# Patient Record
Sex: FEMALE | Race: WHITE | Employment: OTHER | URBAN - METROPOLITAN AREA
[De-identification: names, ages, dates, MRNs, and addresses within clinical notes are randomized per-mention and may not be internally consistent; named-entity substitution may affect disease eponyms.]

---

## 2017-01-09 ENCOUNTER — HOSPITAL ENCOUNTER (OUTPATIENT)
Dept: ULTRASOUND IMAGING | Age: 57
Discharge: HOME | End: 2017-01-09
Attending: INTERNAL MEDICINE
Payer: COMMERCIAL

## 2017-01-09 ENCOUNTER — TELEPHONE (OUTPATIENT)
Dept: FAMILY MEDICINE CLINIC | Facility: CLINIC | Age: 57
End: 2017-01-09

## 2017-01-09 DIAGNOSIS — R10.11 RIGHT UPPER QUADRANT PAIN: ICD-10-CM

## 2017-01-09 DIAGNOSIS — R10.11 RUQ ABDOMINAL PAIN: Primary | ICD-10-CM

## 2017-01-09 PROCEDURE — 76700 US EXAM ABDOM COMPLETE: CPT

## 2017-01-11 ENCOUNTER — TELEPHONE (OUTPATIENT)
Dept: FAMILY MEDICINE CLINIC | Facility: CLINIC | Age: 57
End: 2017-01-11

## 2017-01-12 ENCOUNTER — TELEPHONE (OUTPATIENT)
Dept: FAMILY MEDICINE CLINIC | Facility: CLINIC | Age: 57
End: 2017-01-12

## 2017-01-12 NOTE — TELEPHONE ENCOUNTER
Per Gino Herrera at [facility] [phone] no prior authorization is required reference # [id number]. Marjorie Bullock at [facility] advised.

## 2017-01-16 ENCOUNTER — TELEPHONE (OUTPATIENT)
Dept: FAMILY MEDICINE CLINIC | Facility: CLINIC | Age: 57
End: 2017-01-16

## 2017-01-16 ENCOUNTER — HOSPITAL ENCOUNTER (OUTPATIENT)
Dept: NUCLEAR MEDICINE | Facility: HOSPITAL | Age: 57
Discharge: HOME | End: 2017-01-16
Attending: INTERNAL MEDICINE
Payer: COMMERCIAL

## 2017-01-16 ENCOUNTER — OFFICE VISIT (OUTPATIENT)
Dept: FAMILY MEDICINE CLINIC | Facility: CLINIC | Age: 57
End: 2017-01-16

## 2017-01-16 VITALS
DIASTOLIC BLOOD PRESSURE: 80 MMHG | BODY MASS INDEX: 34 KG/M2 | SYSTOLIC BLOOD PRESSURE: 118 MMHG | TEMPERATURE: 98 F | RESPIRATION RATE: 16 BRPM | WEIGHT: 200 LBS

## 2017-01-16 DIAGNOSIS — R10.11 RUQ ABDOMINAL PAIN: ICD-10-CM

## 2017-01-16 DIAGNOSIS — J01.00 SUBACUTE MAXILLARY SINUSITIS: Primary | ICD-10-CM

## 2017-01-16 PROCEDURE — 78227 HEPATOBIL SYST IMAGE W/DRUG: CPT

## 2017-01-16 PROCEDURE — 99214 OFFICE O/P EST MOD 30 MIN: CPT | Performed by: INTERNAL MEDICINE

## 2017-01-16 RX ORDER — AZITHROMYCIN 250 MG/1
TABLET, FILM COATED ORAL
Qty: 6 TABLET | Refills: 0 | Status: SHIPPED | OUTPATIENT
Start: 2017-01-16 | End: 2017-04-17 | Stop reason: ALTCHOICE

## 2017-01-16 NOTE — PROGRESS NOTES
Ulisses  is a 61year old female. HPI:   Pt has been having more sinus pressure and PND. Very prone to infections. Current Outpatient Prescriptions:  Omeprazole 40 MG Oral Capsule Delayed Release Take 1 capsule (40 mg total) by mouth daily.  D cyanosis, clubbing or edema    ASSESSMENT AND PLAN:   Subacute maxillary sinusitis  (primary encounter diagnosis)  Empiric antibiotics. May use OTC medications for cough, low grade fever, or congestion.   Return if symptoms do not resolve or improve in the

## 2017-01-17 ENCOUNTER — TELEPHONE (OUTPATIENT)
Dept: FAMILY MEDICINE CLINIC | Facility: CLINIC | Age: 57
End: 2017-01-17

## 2017-01-29 ENCOUNTER — HOSPITAL ENCOUNTER (OUTPATIENT)
Age: 57
Discharge: OTHER INSTITUTION | End: 2017-01-29
Attending: EMERGENCY MEDICINE
Payer: COMMERCIAL

## 2017-01-29 VITALS
RESPIRATION RATE: 20 BRPM | OXYGEN SATURATION: 96 % | HEART RATE: 140 BPM | WEIGHT: 200 LBS | SYSTOLIC BLOOD PRESSURE: 141 MMHG | BODY MASS INDEX: 34.15 KG/M2 | TEMPERATURE: 100 F | DIASTOLIC BLOOD PRESSURE: 91 MMHG | HEIGHT: 64 IN

## 2017-01-29 DIAGNOSIS — R11.2 NAUSEA VOMITING AND DIARRHEA: ICD-10-CM

## 2017-01-29 DIAGNOSIS — R73.9 HYPERGLYCEMIA: ICD-10-CM

## 2017-01-29 DIAGNOSIS — E87.5 HYPERKALEMIA: Primary | ICD-10-CM

## 2017-01-29 DIAGNOSIS — R19.7 NAUSEA VOMITING AND DIARRHEA: ICD-10-CM

## 2017-01-29 LAB
#LYMPHOCYTE IC: 0.2 X10ˆ3/UL (ref 0.9–3.2)
#MXD IC: 0.3 X10ˆ3/UL (ref 0.1–1)
#NEUTROPHIL IC: 9.3 X10ˆ3/UL (ref 1.3–6.7)
ATRIAL RATE: 119 BPM
CREAT SERPL-MCNC: 0.6 MG/DL (ref 0.4–1)
CREAT SERPL-MCNC: 0.6 MG/DL (ref 0.4–1)
GLUCOSE BLD-MCNC: 343 MG/DL (ref 65–99)
GLUCOSE BLD-MCNC: 347 MG/DL (ref 65–99)
HCT IC: 49.7 % (ref 37–54)
HGB IC: 16.9 G/DL (ref 11.7–16)
ISTAT BLOOD GAS TCO2: 19 MMOL/L (ref 22–32)
ISTAT BLOOD GAS TCO2: 21 MMOL/L (ref 22–32)
ISTAT BUN: 26 MG/DL (ref 8–20)
ISTAT BUN: 28 MG/DL (ref 8–20)
ISTAT CHLORIDE: 104 MMOL/L (ref 101–111)
ISTAT CHLORIDE: 105 MMOL/L (ref 101–111)
ISTAT HEMATOCRIT: 52 % (ref 37–54)
ISTAT HEMATOCRIT: 53 % (ref 37–54)
ISTAT IONIZED CALCIUM: 1 MMOL/L (ref 1.12–1.32)
ISTAT IONIZED CALCIUM: 1.02 MMOL/L (ref 1.12–1.32)
ISTAT POTASSIUM: 6.3 MMOL/L (ref 3.6–5.1)
ISTAT POTASSIUM: 6.4 MMOL/L (ref 3.6–5.1)
ISTAT SODIUM: 137 MMOL/L (ref 136–144)
ISTAT SODIUM: 137 MMOL/L (ref 136–144)
MCH IC: 28.3 PG (ref 27–33.2)
MCHC IC: 34 G/DL (ref 31–37)
MCV IC: 83.1 FL (ref 81–100)
P AXIS: 26 DEGREES
P-R INTERVAL: 138 MS
PLT IC: 253 X10ˆ3/UL (ref 150–450)
Q-T INTERVAL: 332 MS
QRS DURATION: 86 MS
QTC CALCULATION (BEZET): 467 MS
R AXIS: -2 DEGREES
RBC IC: 5.98 X10ˆ6/UL (ref 3.8–5.1)
T AXIS: 49 DEGREES
VENTRICULAR RATE: 119 BPM
WBC IC: 9.8 X10ˆ3/UL (ref 4–13)

## 2017-01-29 PROCEDURE — 80047 BASIC METABLC PNL IONIZED CA: CPT

## 2017-01-29 PROCEDURE — 81002 URINALYSIS NONAUTO W/O SCOPE: CPT | Performed by: EMERGENCY MEDICINE

## 2017-01-29 PROCEDURE — 93005 ELECTROCARDIOGRAM TRACING: CPT

## 2017-01-29 PROCEDURE — 99205 OFFICE O/P NEW HI 60 MIN: CPT

## 2017-01-29 PROCEDURE — 96374 THER/PROPH/DIAG INJ IV PUSH: CPT

## 2017-01-29 PROCEDURE — 96375 TX/PRO/DX INJ NEW DRUG ADDON: CPT

## 2017-01-29 PROCEDURE — 93010 ELECTROCARDIOGRAM REPORT: CPT

## 2017-01-29 PROCEDURE — 85025 COMPLETE CBC W/AUTO DIFF WBC: CPT | Performed by: EMERGENCY MEDICINE

## 2017-01-29 PROCEDURE — 99215 OFFICE O/P EST HI 40 MIN: CPT

## 2017-01-29 PROCEDURE — 82962 GLUCOSE BLOOD TEST: CPT

## 2017-01-29 RX ORDER — KETOROLAC TROMETHAMINE 30 MG/ML
30 INJECTION, SOLUTION INTRAMUSCULAR; INTRAVENOUS ONCE
Status: COMPLETED | OUTPATIENT
Start: 2017-01-29 | End: 2017-01-29

## 2017-01-29 RX ORDER — ONDANSETRON 2 MG/ML
8 INJECTION INTRAMUSCULAR; INTRAVENOUS ONCE
Status: COMPLETED | OUTPATIENT
Start: 2017-01-29 | End: 2017-01-29

## 2017-01-29 RX ORDER — SODIUM CHLORIDE 9 MG/ML
1000 INJECTION, SOLUTION INTRAVENOUS ONCE
Status: COMPLETED | OUTPATIENT
Start: 2017-01-29 | End: 2017-01-29

## 2017-01-29 NOTE — ED INITIAL ASSESSMENT (HPI)
Vomiting started in middle of night around 2 am, and now some diarrhea. Nausea now. Just completed zpak earlier this week for sinus infection.

## 2017-01-29 NOTE — ED PROVIDER NOTES
Patient presents with:  Nausea/Vomiting/Diarrhea (gastrointestinal)    HPI:     Molly Méndez is a 61year old female with hx of DM, HTN, diverticulosis who presents with chief complaint of n/v/d. States she woke up this am at 2 and began vomiting.    Sta [address]  Rhythm: Sinus tachycardia  No acute changes noted. MDM:     N/V/D with benign abdomen. Accucheck obtained 294. IV placed with toradol and zofran given along with IVF (CMP in Dec 2016 with normal Cr). ISTAT with K 6.4.   Had RN repeat while EKG

## 2017-01-30 ENCOUNTER — TELEPHONE (OUTPATIENT)
Dept: FAMILY MEDICINE CLINIC | Facility: CLINIC | Age: 57
End: 2017-01-30

## 2017-01-30 LAB — GLUCOSE BLD-MCNC: 294 MG/DL (ref 65–99)

## 2017-01-30 NOTE — TELEPHONE ENCOUNTER
Patient said she was in the ER yesterday for dehydration due to vomiting and diarrhea and they gave her Zofran. She said they took her by ambulance from [facility] urgent care to [facility] because her potassium was high.  she said it was normal at [place] [address] so t

## 2017-02-02 ENCOUNTER — OFFICE VISIT (OUTPATIENT)
Dept: FAMILY MEDICINE CLINIC | Facility: CLINIC | Age: 57
End: 2017-02-02

## 2017-02-02 VITALS — TEMPERATURE: 98 F | BODY MASS INDEX: 35 KG/M2 | HEIGHT: 64 IN | WEIGHT: 205 LBS

## 2017-02-02 DIAGNOSIS — A08.4 VIRAL GASTROENTERITIS: Primary | ICD-10-CM

## 2017-02-02 PROCEDURE — 99213 OFFICE O/P EST LOW 20 MIN: CPT | Performed by: INTERNAL MEDICINE

## 2017-02-02 RX ORDER — ONDANSETRON 4 MG/1
TABLET, ORALLY DISINTEGRATING ORAL
COMMUNITY
Start: 2017-01-29 | End: 2017-04-25 | Stop reason: ALTCHOICE

## 2017-02-02 NOTE — PROGRESS NOTES
Aurelia Brambila is a 61year old female. HPI:   Pt has had the stomach flu; she was seen in UC and given fluids. K was drawn and >6 so she was sent to [facility] ER, where is was rechecked and 5.4. They think it was a lab error.   She had several EKG's and no Temp(Src) 97.8 °F (36.6 °C) (Temporal)  Ht 64\"  Wt 205 lb  BMI 35.17 kg/m2  GENERAL: well developed, well nourished,in no apparent distress  SKIN: no rashes,no suspicious lesions  HEENT: atraumatic, normocephalic,ears and throat are clear  NECK: supple,no

## 2017-02-07 ENCOUNTER — TELEPHONE (OUTPATIENT)
Dept: FAMILY MEDICINE CLINIC | Facility: CLINIC | Age: 57
End: 2017-02-07

## 2017-02-07 NOTE — TELEPHONE ENCOUNTER
Return to work note faxed to office of the  [phone], it is not going through. LM for patient to Cb.  Note faxed to patient at [phone]

## 2017-02-08 ENCOUNTER — MED REC SCAN ONLY (OUTPATIENT)
Dept: FAMILY MEDICINE CLINIC | Facility: CLINIC | Age: 57
End: 2017-02-08

## 2017-02-16 ENCOUNTER — TELEPHONE (OUTPATIENT)
Dept: FAMILY MEDICINE CLINIC | Facility: CLINIC | Age: 57
End: 2017-02-16

## 2017-02-16 RX ORDER — FLUCONAZOLE 150 MG/1
150 TABLET ORAL ONCE
Qty: 1 TABLET | Refills: 0 | Status: SHIPPED | OUTPATIENT
Start: 2017-02-16 | End: 2017-02-16

## 2017-02-16 NOTE — TELEPHONE ENCOUNTER
Patient said she has been having some vaginal itching and burning but no burning with urination. She has been taking the Florastor since having the stomach flu and didn't know if she should stop it. Will dr Zenon Spatz also prescribe some Diflucan?

## 2017-02-20 ENCOUNTER — TELEPHONE (OUTPATIENT)
Dept: FAMILY MEDICINE CLINIC | Facility: CLINIC | Age: 57
End: 2017-02-20

## 2017-02-20 NOTE — TELEPHONE ENCOUNTER
Patient advised that Dr Isabella Garcia filled out the paper work. She said that the FMLA can only go back one month. She will call her  to confirm and let us know where she wants it sent.

## 2017-02-20 NOTE — TELEPHONE ENCOUNTER
Patient said she was put on disciplinary action for having mulitiple episodes of being sick for the last year from 1/15/16. She said she never turned in her type 2 FMLA form last year paperwork was filled out 1/15/16.  She said that she only had three dates

## 2017-02-21 NOTE — TELEPHONE ENCOUNTER
FMLA paperwork faxed to Bethany Bahena in Personnel [phone], patient is also requesting a doctors note for 1/10/17 stating she had discomfort from the ultrasound of her gallbladder she had on 1/9/17.  Note faxed also copy of paperwork up front for patient to

## 2017-02-27 ENCOUNTER — TELEPHONE (OUTPATIENT)
Dept: FAMILY MEDICINE CLINIC | Facility: CLINIC | Age: 57
End: 2017-02-27

## 2017-03-24 ENCOUNTER — TELEPHONE (OUTPATIENT)
Dept: FAMILY MEDICINE CLINIC | Facility: CLINIC | Age: 57
End: 2017-03-24

## 2017-03-24 DIAGNOSIS — R10.11 RIGHT UPPER QUADRANT ABDOMINAL PAIN: Primary | ICD-10-CM

## 2017-03-24 RX ORDER — OMEPRAZOLE 40 MG/1
40 CAPSULE, DELAYED RELEASE ORAL DAILY
Qty: 30 CAPSULE | Refills: 0 | Status: SHIPPED | OUTPATIENT
Start: 2017-03-24 | End: 2017-04-25

## 2017-03-24 NOTE — TELEPHONE ENCOUNTER
Received fax stating that plan does not cover Omeprazole 40 mg tabs. I called plan at [phone]. I spoke with Noa Velasquez, clinical information was provided and medication was approved for 36 months.  [facility] #[id number]  Opal Elizalde, 03/24/2017, 1

## 2017-04-17 ENCOUNTER — HOSPITAL (OUTPATIENT)
Dept: OTHER | Age: 57
End: 2017-04-17
Attending: OBSTETRICS & GYNECOLOGY

## 2017-04-25 ENCOUNTER — TELEPHONE (OUTPATIENT)
Dept: FAMILY MEDICINE CLINIC | Facility: CLINIC | Age: 57
End: 2017-04-25

## 2017-04-25 ENCOUNTER — OFFICE VISIT (OUTPATIENT)
Dept: FAMILY MEDICINE CLINIC | Facility: CLINIC | Age: 57
End: 2017-04-25

## 2017-04-25 VITALS — SYSTOLIC BLOOD PRESSURE: 128 MMHG | DIASTOLIC BLOOD PRESSURE: 60 MMHG | TEMPERATURE: 98 F

## 2017-04-25 DIAGNOSIS — E78.5 HYPERLIPIDEMIA, UNSPECIFIED HYPERLIPIDEMIA TYPE: ICD-10-CM

## 2017-04-25 DIAGNOSIS — J32.9 SINUSITIS, UNSPECIFIED CHRONICITY, UNSPECIFIED LOCATION: Primary | ICD-10-CM

## 2017-04-25 DIAGNOSIS — I10 ESSENTIAL HYPERTENSION, BENIGN: ICD-10-CM

## 2017-04-25 PROCEDURE — 99214 OFFICE O/P EST MOD 30 MIN: CPT | Performed by: FAMILY MEDICINE

## 2017-04-25 RX ORDER — AZITHROMYCIN 250 MG/1
TABLET, FILM COATED ORAL
Qty: 6 TABLET | Refills: 0 | Status: SHIPPED | OUTPATIENT
Start: 2017-04-25 | End: 2017-06-12 | Stop reason: ALTCHOICE

## 2017-04-25 RX ORDER — PREDNISONE 20 MG/1
20 TABLET ORAL 2 TIMES DAILY
Qty: 10 TABLET | Refills: 0 | Status: SHIPPED | OUTPATIENT
Start: 2017-04-25 | End: 2017-05-02

## 2017-04-25 RX ORDER — OMEPRAZOLE 40 MG/1
40 CAPSULE, DELAYED RELEASE ORAL DAILY
Qty: 90 CAPSULE | Refills: 0 | Status: SHIPPED | OUTPATIENT
Start: 2017-04-25 | End: 2017-07-24

## 2017-04-25 NOTE — PATIENT INSTRUCTIONS
Meds as ordered. Recommend short course of decongestant ×3-5 days. Recommend aspirin daily. Discussed factor V Leiden deficiency that mother has. Follow-up with Dr. Arthur Manuel as directed.

## 2017-04-25 NOTE — PROGRESS NOTES
HPI:    Patient ID: Alexandra Friedman is a 61year old female. X 10 days + head janessa  Rare cough  + sinus pressure /   HPI    Review of Systems   Constitutional: Positive for fatigue. Negative for fever and chills.    HENT: Positive for congestion, postnasal warm and dry. Vitals reviewed.       /60 mmHg  Temp(Src) 97.6 °F (36.4 °C) (Temporal)  Ht   Wt          ASSESSMENT/PLAN:   Sinusitis, unspecified chronicity, unspecified location  (primary encounter diagnosis)    No orders of the defined types were

## 2017-05-17 ENCOUNTER — TELEPHONE (OUTPATIENT)
Dept: FAMILY MEDICINE CLINIC | Facility: CLINIC | Age: 57
End: 2017-05-17

## 2017-05-17 NOTE — TELEPHONE ENCOUNTER
Stremor message sent to patient advising her she is due for f/u with Dr. Ligia Alicia and labs.    Linda Armas, 05/17/2017, 3:53 PM

## 2017-05-22 ENCOUNTER — APPOINTMENT (OUTPATIENT)
Dept: LAB | Age: 57
End: 2017-05-22
Attending: INTERNAL MEDICINE
Payer: COMMERCIAL

## 2017-05-22 ENCOUNTER — OFFICE VISIT (OUTPATIENT)
Dept: FAMILY MEDICINE CLINIC | Facility: CLINIC | Age: 57
End: 2017-05-22

## 2017-05-22 VITALS
HEART RATE: 82 BPM | RESPIRATION RATE: 18 BRPM | TEMPERATURE: 98 F | HEIGHT: 65 IN | SYSTOLIC BLOOD PRESSURE: 124 MMHG | DIASTOLIC BLOOD PRESSURE: 80 MMHG

## 2017-05-22 DIAGNOSIS — E11.65 UNCONTROLLED TYPE 2 DIABETES MELLITUS WITH DIABETIC NEPHROPATHY, UNSPECIFIED LONG TERM INSULIN USE STATUS: ICD-10-CM

## 2017-05-22 DIAGNOSIS — E11.21 UNCONTROLLED TYPE 2 DIABETES MELLITUS WITH DIABETIC NEPHROPATHY, UNSPECIFIED LONG TERM INSULIN USE STATUS: ICD-10-CM

## 2017-05-22 DIAGNOSIS — E11.65 UNCONTROLLED TYPE 2 DIABETES MELLITUS WITH DIABETIC NEPHROPATHY, UNSPECIFIED LONG TERM INSULIN USE STATUS: Primary | ICD-10-CM

## 2017-05-22 DIAGNOSIS — H65.91 OME (OTITIS MEDIA WITH EFFUSION), RIGHT: ICD-10-CM

## 2017-05-22 DIAGNOSIS — E11.21 UNCONTROLLED TYPE 2 DIABETES MELLITUS WITH DIABETIC NEPHROPATHY, UNSPECIFIED LONG TERM INSULIN USE STATUS: Primary | ICD-10-CM

## 2017-05-22 PROCEDURE — 83036 HEMOGLOBIN GLYCOSYLATED A1C: CPT | Performed by: INTERNAL MEDICINE

## 2017-05-22 PROCEDURE — 99214 OFFICE O/P EST MOD 30 MIN: CPT | Performed by: INTERNAL MEDICINE

## 2017-05-22 PROCEDURE — 36415 COLL VENOUS BLD VENIPUNCTURE: CPT | Performed by: INTERNAL MEDICINE

## 2017-05-22 RX ORDER — CIPROFLOXACIN 500 MG/1
500 TABLET, FILM COATED ORAL 2 TIMES DAILY
Qty: 20 TABLET | Refills: 0 | Status: SHIPPED | OUTPATIENT
Start: 2017-05-22 | End: 2017-06-01

## 2017-05-22 NOTE — PROGRESS NOTES
HPI:   Kerline Alba is a 61year old female who presents for recheck of her diabetes. Pt has been checking her feet on a regular basis. Pt denies any tingling of the feet. Pt denies any issues with depression. Pt complains of ear pain in the right ear. azithromycin (ZITHROMAX Z-TONNY) 250 MG Oral Tab Take two tablets by mouth today, then one tablet daily.  Disp: 6 tablet Rfl: 0      Past Medical History   Diagnosis Date   • Essential hypertension, benign 11/5/2007   • Obesity, unspecified 11/5/2007   • Al clubbing or edema  NEURO: sensation is intact to monofilament     ASSESSMENT AND PLAN:   Taco Grande is a 61year old female who presents for a recheck of her diabetes. Diabetic control is unknown. She has a right otitis as well.   Recommendations are:

## 2017-05-26 ENCOUNTER — TELEPHONE (OUTPATIENT)
Dept: FAMILY MEDICINE CLINIC | Facility: CLINIC | Age: 57
End: 2017-05-26

## 2017-06-12 ENCOUNTER — OFFICE VISIT (OUTPATIENT)
Dept: FAMILY MEDICINE CLINIC | Facility: CLINIC | Age: 57
End: 2017-06-12

## 2017-06-12 VITALS
DIASTOLIC BLOOD PRESSURE: 80 MMHG | SYSTOLIC BLOOD PRESSURE: 120 MMHG | BODY MASS INDEX: 33 KG/M2 | TEMPERATURE: 98 F | WEIGHT: 197.13 LBS

## 2017-06-12 DIAGNOSIS — E11.21 UNCONTROLLED TYPE 2 DIABETES MELLITUS WITH DIABETIC NEPHROPATHY, UNSPECIFIED LONG TERM INSULIN USE STATUS: Primary | ICD-10-CM

## 2017-06-12 DIAGNOSIS — E11.65 UNCONTROLLED TYPE 2 DIABETES MELLITUS WITH DIABETIC NEPHROPATHY, UNSPECIFIED LONG TERM INSULIN USE STATUS: Primary | ICD-10-CM

## 2017-06-12 PROCEDURE — 99214 OFFICE O/P EST MOD 30 MIN: CPT | Performed by: INTERNAL MEDICINE

## 2017-06-12 RX ORDER — BLOOD-GLUCOSE METER
1 KIT MISCELLANEOUS AS NEEDED
Qty: 1 KIT | Refills: 0 | Status: SHIPPED | OUTPATIENT
Start: 2017-06-12 | End: 2017-06-13

## 2017-06-12 RX ORDER — METHYLPREDNISOLONE 4 MG/1
TABLET ORAL
Refills: 0 | COMMUNITY
Start: 2017-06-05 | End: 2017-09-19 | Stop reason: ALTCHOICE

## 2017-06-12 RX ORDER — CLARITHROMYCIN 500 MG/1
TABLET, COATED ORAL
Refills: 0 | COMMUNITY
Start: 2017-06-05 | End: 2017-08-07 | Stop reason: ALTCHOICE

## 2017-06-12 NOTE — PROGRESS NOTES
HPI:   Sadie Rae is a 61year old female who presents for recheck of her diabetes. Patient’s FBS have been high, but she rarely checks them. Pt has been checking her feet on a regular basis. Pt denies any tingling of the feet.  Pt denies any issues wi 1 capsule (40 mg total) by mouth daily. Disp: 90 capsule Rfl: 0   aspirin 81 MG Oral Tab Take 1 tablet (81 mg total) by mouth daily. Disp:  Rfl: 0   Atorvastatin Calcium (LIPITOR) 40 MG Oral Tab Take 1 tablet (40 mg total) by mouth nightly.  Disp: 90 tablet heartburn  NEURO: denies headaches    EXAM:   /80 mmHg  Temp(Src) 98 °F (36.7 °C) (Temporal)  Wt 197 lb 2 oz  GENERAL: well developed, well nourished,in no apparent distress  SKIN: no rashes,no suspicious lesions  NECK: supple,no adenopathy,no bruits

## 2017-06-13 ENCOUNTER — TELEPHONE (OUTPATIENT)
Dept: FAMILY MEDICINE CLINIC | Facility: CLINIC | Age: 57
End: 2017-06-13

## 2017-06-13 RX ORDER — BLOOD-GLUCOSE METER
1 EACH MISCELLANEOUS 2 TIMES DAILY
Qty: 1 KIT | Refills: 0 | Status: SHIPPED | OUTPATIENT
Start: 2017-06-13 | End: 2018-06-13

## 2017-06-13 RX ORDER — LANCETS 33 GAUGE
1 EACH MISCELLANEOUS 2 TIMES DAILY
Qty: 1 BOX | Refills: 0 | Status: SHIPPED | OUTPATIENT
Start: 2017-06-13 | End: 2018-06-13

## 2017-06-13 NOTE — TELEPHONE ENCOUNTER
Sonam states that patient's insurance will not pay for the Freestyle, they will pay for One Touch. New script faxed.

## 2017-07-06 ENCOUNTER — TELEPHONE (OUTPATIENT)
Dept: FAMILY MEDICINE CLINIC | Facility: CLINIC | Age: 57
End: 2017-07-06

## 2017-07-06 NOTE — TELEPHONE ENCOUNTER
EXPOSURE TO MENENGITIS BY A COWORKER IN PASSING?  SHE IS NOT HAVING SX.  PLEASE ADVISE ON WHAT TO WATCH FOR OR IF SHE HAS BEEN VACCINATED  PLEASE ADVISE

## 2017-07-06 NOTE — TELEPHONE ENCOUNTER
Patient said she dose not know if she has bacterial or viral meningitis. She said that her coworker was gone for 3 weeks and she had minimal contact. Is there anything she should do?

## 2017-07-25 RX ORDER — OMEPRAZOLE 40 MG/1
CAPSULE, DELAYED RELEASE ORAL
Qty: 90 CAPSULE | Refills: 0 | Status: SHIPPED | OUTPATIENT
Start: 2017-07-25 | End: 2017-10-17

## 2017-08-07 ENCOUNTER — OFFICE VISIT (OUTPATIENT)
Dept: FAMILY MEDICINE CLINIC | Facility: CLINIC | Age: 57
End: 2017-08-07

## 2017-08-07 VITALS
SYSTOLIC BLOOD PRESSURE: 120 MMHG | HEART RATE: 75 BPM | TEMPERATURE: 98 F | DIASTOLIC BLOOD PRESSURE: 84 MMHG | OXYGEN SATURATION: 99 %

## 2017-08-07 DIAGNOSIS — J01.00 ACUTE NON-RECURRENT MAXILLARY SINUSITIS: Primary | ICD-10-CM

## 2017-08-07 PROCEDURE — 99213 OFFICE O/P EST LOW 20 MIN: CPT | Performed by: FAMILY MEDICINE

## 2017-08-07 RX ORDER — CIPROFLOXACIN 500 MG/1
500 TABLET, FILM COATED ORAL 2 TIMES DAILY
Qty: 20 TABLET | Refills: 0 | Status: SHIPPED | OUTPATIENT
Start: 2017-08-07 | End: 2017-09-19 | Stop reason: ALTCHOICE

## 2017-08-07 NOTE — PROGRESS NOTES
Giacomo Bonds is a 61year old female. Patient presents with: Other: sore throat started on 8/4, headache, achy, congestion--taking mucinex sinus max. ... [facility] room 2      HPI:   Patient complains of sinus congestion, headache, postnasal drip, sore throat.   Gokul Anastacio APPENDECTOMY  1977: BREAST SURGERY  No date: CHOLECYSTECTOMY  12/14: COLON SURGERY      Comment: removed 10 inches of colon for diverticulitis  11/14: COLONOSCOPY      Comment: diverticulitis  1994: HYSTERECTOMY      Comment: LAV for endometriosis (Ovarie antibiotics. The only definitive means to diagnose a bacterial sinus infection is to aspirate fluid from the sinus cavity and then culture that fluid. That is a painful and impractical procedure in an office setting.  The choice to try an antibiotic is based

## 2017-08-08 ENCOUNTER — TELEPHONE (OUTPATIENT)
Dept: FAMILY MEDICINE CLINIC | Facility: CLINIC | Age: 57
End: 2017-08-08

## 2017-08-08 NOTE — TELEPHONE ENCOUNTER
Calling the patient to see if she just needs a note for today.    Left message   Placed the note at the

## 2017-08-08 NOTE — TELEPHONE ENCOUNTER
SHE STILL DOES NOT FEEL WELL AND IS LEAVING WORK AND WANTS TO KNOW IF SHE CAN STOP AND GET A NOTE FOR WORK FOR TODAY.     LEAVE MESSAGE SHE SAID

## 2017-08-09 ENCOUNTER — TELEPHONE (OUTPATIENT)
Dept: FAMILY MEDICINE CLINIC | Facility: CLINIC | Age: 57
End: 2017-08-09

## 2017-08-09 NOTE — TELEPHONE ENCOUNTER
Pt called again, sd she can see the letter in my chart, but she cant print it off,  Can it be faxed to [employer].  Of [employer] [phone]

## 2017-08-09 NOTE — TELEPHONE ENCOUNTER
She needs a note faxed to work ASAP stating: please excuse Lela Mares from work on 8-8-17 from 3:15 to 5:30pm.  Fax [phone]. She said her employer is giving her a hard time and she needs the note ASAP.

## 2017-08-26 ENCOUNTER — TELEPHONE (OUTPATIENT)
Dept: FAMILY MEDICINE CLINIC | Facility: CLINIC | Age: 57
End: 2017-08-26

## 2017-08-26 NOTE — TELEPHONE ENCOUNTER
Requesting a work excuse for 8/25/2017.   Patient was having a gallbladder attack yesterday,   Was told it would be addressed on Monday

## 2017-08-28 NOTE — TELEPHONE ENCOUNTER
Patient said that she has FMLA for her gallbladder, she had pizza Thursday and Friday she had abdominal pain and vomiting. Patient advised note is up front for her to .

## 2017-09-19 ENCOUNTER — OFFICE VISIT (OUTPATIENT)
Dept: FAMILY MEDICINE CLINIC | Facility: CLINIC | Age: 57
End: 2017-09-19

## 2017-09-19 ENCOUNTER — TELEPHONE (OUTPATIENT)
Dept: FAMILY MEDICINE CLINIC | Facility: CLINIC | Age: 57
End: 2017-09-19

## 2017-09-19 VITALS — TEMPERATURE: 98 F | SYSTOLIC BLOOD PRESSURE: 126 MMHG | HEART RATE: 88 BPM | DIASTOLIC BLOOD PRESSURE: 80 MMHG

## 2017-09-19 DIAGNOSIS — J01.00 SUBACUTE MAXILLARY SINUSITIS: Primary | ICD-10-CM

## 2017-09-19 PROCEDURE — 99213 OFFICE O/P EST LOW 20 MIN: CPT | Performed by: INTERNAL MEDICINE

## 2017-09-19 NOTE — TELEPHONE ENCOUNTER
Mckenna Cowan has sinus stuff going on, no openings today with any of the docs and she has to go back to wk tomorrow, call Mckenna Cowan

## 2017-09-19 NOTE — PROGRESS NOTES
HPI:   Thalia Ruby is a 61year old female who presents for upper respiratory symptoms for  4  days. Patient reports congestion, sinus pain.       Current Outpatient Prescriptions:  OMEPRAZOLE 40 MG Oral Capsule Delayed Release TAKE 1 CAPSULE(40 MG) BY M ARTHROSCOPIC KNEE SURGERY BILAT  No date: OTHER      Comment: \"BRACHIAL CEFT (LEFT?)CYST\"  2012: OTHER SURGICAL HISTORY      Comment: cyst on back removed   Family History   Problem Relation Age of Onset   • High Blood Pressure Mother    • High Blood Pre

## 2017-09-20 ENCOUNTER — TELEPHONE (OUTPATIENT)
Dept: FAMILY MEDICINE CLINIC | Facility: CLINIC | Age: 57
End: 2017-09-20

## 2017-09-20 RX ORDER — AZITHROMYCIN 250 MG/1
TABLET, FILM COATED ORAL
Qty: 6 TABLET | Refills: 0 | Status: SHIPPED | OUTPATIENT
Start: 2017-09-20 | End: 2017-10-09 | Stop reason: ALTCHOICE

## 2017-09-20 NOTE — TELEPHONE ENCOUNTER
BREEK WAS SUPPOSED TO BE SENT TO [facility] ON [address] & THEY DO NOT HAVE IT, PLEASE RE SEND

## 2017-10-02 ENCOUNTER — IMMUNIZATION (OUTPATIENT)
Dept: FAMILY MEDICINE CLINIC | Facility: CLINIC | Age: 57
End: 2017-10-02

## 2017-10-02 ENCOUNTER — LABORATORY ENCOUNTER (OUTPATIENT)
Dept: LAB | Age: 57
End: 2017-10-02
Attending: INTERNAL MEDICINE
Payer: COMMERCIAL

## 2017-10-02 DIAGNOSIS — E11.65 DM (DIABETES MELLITUS), TYPE 2, UNCONTROLLED (HCC): Primary | ICD-10-CM

## 2017-10-02 DIAGNOSIS — Z23 NEED FOR VACCINATION: ICD-10-CM

## 2017-10-02 DIAGNOSIS — E78.00 HYPERCHOLESTEREMIA: ICD-10-CM

## 2017-10-02 PROCEDURE — 90686 IIV4 VACC NO PRSV 0.5 ML IM: CPT | Performed by: INTERNAL MEDICINE

## 2017-10-02 PROCEDURE — 90471 IMMUNIZATION ADMIN: CPT | Performed by: INTERNAL MEDICINE

## 2017-10-02 PROCEDURE — 36415 COLL VENOUS BLD VENIPUNCTURE: CPT | Performed by: INTERNAL MEDICINE

## 2017-10-09 ENCOUNTER — OFFICE VISIT (OUTPATIENT)
Dept: FAMILY MEDICINE CLINIC | Facility: CLINIC | Age: 57
End: 2017-10-09

## 2017-10-09 VITALS
TEMPERATURE: 98 F | WEIGHT: 202.38 LBS | SYSTOLIC BLOOD PRESSURE: 130 MMHG | BODY MASS INDEX: 34 KG/M2 | HEART RATE: 80 BPM | DIASTOLIC BLOOD PRESSURE: 80 MMHG | RESPIRATION RATE: 16 BRPM

## 2017-10-09 DIAGNOSIS — E11.65 UNCONTROLLED TYPE 2 DIABETES MELLITUS WITH STAGE 2 CHRONIC KIDNEY DISEASE, WITHOUT LONG-TERM CURRENT USE OF INSULIN (HCC): Primary | ICD-10-CM

## 2017-10-09 DIAGNOSIS — N18.2 UNCONTROLLED TYPE 2 DIABETES MELLITUS WITH STAGE 2 CHRONIC KIDNEY DISEASE, WITHOUT LONG-TERM CURRENT USE OF INSULIN (HCC): Primary | ICD-10-CM

## 2017-10-09 DIAGNOSIS — E11.22 UNCONTROLLED TYPE 2 DIABETES MELLITUS WITH STAGE 2 CHRONIC KIDNEY DISEASE, WITHOUT LONG-TERM CURRENT USE OF INSULIN (HCC): Primary | ICD-10-CM

## 2017-10-09 PROCEDURE — 99214 OFFICE O/P EST MOD 30 MIN: CPT | Performed by: INTERNAL MEDICINE

## 2017-10-09 RX ORDER — BUPROPION HYDROCHLORIDE 75 MG/1
75 TABLET ORAL 2 TIMES DAILY
Qty: 60 TABLET | Refills: 0 | Status: SHIPPED | OUTPATIENT
Start: 2017-10-09 | End: 2017-11-06

## 2017-10-10 NOTE — PROGRESS NOTES
HPI:   Sole Kerr is a 61year old female who presents for recheck of her diabetes. Patient’s FBS have been 140-160. Pt has been checking her feet on a regular basis. Pt denies any tingling of the feet. Pt denies any issues with depression.  Pt complai 100 strip Rfl: 0   ONETOUCH DELICA LANCETS 32V Does not apply Misc 1 lancet by Finger stick route 2 (two) times daily. Disp: 1 Box Rfl: 0   aspirin 81 MG Oral Tab Take 1 tablet (81 mg total) by mouth daily.  Disp:  Rfl: 0   Atorvastatin Calcium (LIPITOR) 40 rashes  RESPIRATORY: denies shortness of breath with exertion  CARDIOVASCULAR: denies chest pain on exertion  GI: denies abdominal pain and denies heartburn  NEURO: denies headaches    EXAM:   /80 (BP Location: Left arm, Patient Position: Sitting, Cu

## 2017-10-17 DIAGNOSIS — I10 ESSENTIAL HYPERTENSION, BENIGN: ICD-10-CM

## 2017-10-17 RX ORDER — OMEPRAZOLE 40 MG/1
CAPSULE, DELAYED RELEASE ORAL
Qty: 90 CAPSULE | Refills: 0 | Status: SHIPPED | OUTPATIENT
Start: 2017-10-17 | End: 2018-01-15

## 2017-10-17 RX ORDER — DAPAGLIFLOZIN 10 MG/1
TABLET, FILM COATED ORAL
Qty: 90 TABLET | Refills: 0 | Status: SHIPPED | OUTPATIENT
Start: 2017-10-17 | End: 2018-01-15

## 2017-10-17 RX ORDER — LOSARTAN POTASSIUM AND HYDROCHLOROTHIAZIDE 12.5; 5 MG/1; MG/1
TABLET ORAL
Qty: 90 TABLET | Refills: 0 | Status: SHIPPED | OUTPATIENT
Start: 2017-10-17 | End: 2018-02-18

## 2017-10-17 RX ORDER — ATORVASTATIN CALCIUM 40 MG/1
TABLET, FILM COATED ORAL
Qty: 90 TABLET | Refills: 0 | Status: SHIPPED | OUTPATIENT
Start: 2017-10-17 | End: 2018-01-15

## 2017-10-17 NOTE — TELEPHONE ENCOUNTER
Losartan 10/31/16 #90x3  Farxiga 10/31/16 #90x3  Atorvastatin 10/31/16 #90x3  Omeprazole 7/25/17 #90x0  Last ov 10/9/17  130/80  Last labs 10/2/17     No future appointments.

## 2017-10-23 RX ORDER — GLIMEPIRIDE 4 MG/1
TABLET ORAL
Qty: 180 TABLET | Refills: 1 | Status: SHIPPED | OUTPATIENT
Start: 2017-10-23 | End: 2018-04-16

## 2017-11-02 ENCOUNTER — OFFICE VISIT (OUTPATIENT)
Dept: FAMILY MEDICINE CLINIC | Facility: CLINIC | Age: 57
End: 2017-11-02

## 2017-11-02 VITALS — TEMPERATURE: 98 F | DIASTOLIC BLOOD PRESSURE: 80 MMHG | SYSTOLIC BLOOD PRESSURE: 132 MMHG

## 2017-11-02 DIAGNOSIS — S09.90XA CLOSED HEAD INJURY, INITIAL ENCOUNTER: ICD-10-CM

## 2017-11-02 DIAGNOSIS — G44.039 EPISODIC PAROXYSMAL HEMICRANIA, NOT INTRACTABLE: Primary | ICD-10-CM

## 2017-11-02 PROCEDURE — 99214 OFFICE O/P EST MOD 30 MIN: CPT | Performed by: INTERNAL MEDICINE

## 2017-11-02 NOTE — PROGRESS NOTES
Joseph Duncan is a 61year old female. HPI:   Pt had a fall at home this week 10/31. She hit the right side of her face and had a headache.       Current Outpatient Prescriptions:  GLIMEPIRIDE 4 MG Oral Tab TAKE 2 TABLETS BY MOUTH EVERY DAY Disp: 180 tab rashes  RESPIRATORY: denies shortness of breath with exertion  CARDIOVASCULAR: denies chest pain on exertion  GI: denies abdominal pain and denies heartburn  NEURO: denies headaches    EXAM:   /80   Temp 98.4 °F (36.9 °C) (Temporal)   GENERAL: well d

## 2017-11-06 RX ORDER — BUPROPION HYDROCHLORIDE 75 MG/1
TABLET ORAL
Qty: 180 TABLET | Refills: 0 | Status: SHIPPED | OUTPATIENT
Start: 2017-11-06 | End: 2018-01-15

## 2017-12-05 ENCOUNTER — TELEPHONE (OUTPATIENT)
Dept: FAMILY MEDICINE CLINIC | Facility: CLINIC | Age: 57
End: 2017-12-05

## 2017-12-05 NOTE — TELEPHONE ENCOUNTER
Daughter requesting Dr Dao Valverde to complete [facility] paperwork foir her so she can care for her Father. Paperwork completed and given to her parents per patient's request and faxed to patient's employer [phone].

## 2017-12-11 ENCOUNTER — MED REC SCAN ONLY (OUTPATIENT)
Dept: FAMILY MEDICINE CLINIC | Facility: CLINIC | Age: 57
End: 2017-12-11

## 2017-12-13 ENCOUNTER — TELEPHONE (OUTPATIENT)
Dept: FAMILY MEDICINE CLINIC | Facility: CLINIC | Age: 57
End: 2017-12-13

## 2017-12-13 NOTE — TELEPHONE ENCOUNTER
PLEASE CALL CESAR ABOUT LA PAPER WORK, IT WASN'T FILLED OUT CORRECTLY. PLEASE FIX AND REFAX TO: [phone]  ATTN: ROSA  PUT TIME FRAME, HOW MANY DAYS PER MONTH SHE CAN ATTEND DR REGALADO.

## 2017-12-28 ENCOUNTER — OFFICE VISIT (OUTPATIENT)
Dept: FAMILY MEDICINE CLINIC | Facility: CLINIC | Age: 57
End: 2017-12-28

## 2017-12-28 VITALS
HEART RATE: 76 BPM | SYSTOLIC BLOOD PRESSURE: 130 MMHG | TEMPERATURE: 99 F | DIASTOLIC BLOOD PRESSURE: 80 MMHG | OXYGEN SATURATION: 97 %

## 2017-12-28 DIAGNOSIS — J01.00 SUBACUTE MAXILLARY SINUSITIS: Primary | ICD-10-CM

## 2017-12-28 PROCEDURE — 99214 OFFICE O/P EST MOD 30 MIN: CPT | Performed by: INTERNAL MEDICINE

## 2017-12-28 NOTE — PROGRESS NOTES
HPI:   Margarita Rick is a 61year old female who presents for upper respiratory symptoms for  6  days. Patient reports congestion, dry cough, sinus pain.       Current Outpatient Prescriptions:  BUPROPION HCL 75 MG Oral Tab TAKE 1 TABLET(75 MG) BY MOUTH TW UNLISTED      Comment: right ankle  No date: OTHER      Comment: ARTHROSCOPIC KNEE SURGERY BILAT  No date: OTHER      Comment: \"BRACHIAL CEFT (LEFT?)CYST\"  2012: OTHER SURGICAL HISTORY      Comment: cyst on back removed   Family History   Problem Relatio

## 2018-01-11 ENCOUNTER — OFFICE VISIT (OUTPATIENT)
Dept: FAMILY MEDICINE CLINIC | Facility: CLINIC | Age: 58
End: 2018-01-11

## 2018-01-11 VITALS — DIASTOLIC BLOOD PRESSURE: 78 MMHG | SYSTOLIC BLOOD PRESSURE: 130 MMHG | TEMPERATURE: 98 F

## 2018-01-11 DIAGNOSIS — R05.3 CHRONIC COUGH: Primary | ICD-10-CM

## 2018-01-11 PROCEDURE — 99214 OFFICE O/P EST MOD 30 MIN: CPT | Performed by: INTERNAL MEDICINE

## 2018-01-11 RX ORDER — BUDESONIDE AND FORMOTEROL FUMARATE DIHYDRATE 160; 4.5 UG/1; UG/1
2 AEROSOL RESPIRATORY (INHALATION) 2 TIMES DAILY
Qty: 1 INHALER | Refills: 0 | Status: SHIPPED | OUTPATIENT
Start: 2018-01-11 | End: 2018-02-10

## 2018-01-11 RX ORDER — LEVOFLOXACIN 500 MG/1
500 TABLET, FILM COATED ORAL DAILY
Qty: 10 TABLET | Refills: 0 | Status: SHIPPED | OUTPATIENT
Start: 2018-01-11 | End: 2018-01-21

## 2018-01-13 NOTE — PROGRESS NOTES
HPI:   Celine Marie is a 61year old female who presents for upper respiratory symptoms for  6  days. Patient reports congestion, dry cough, sinus pain.       Current Outpatient Prescriptions:  Budesonide-Formoterol Fumarate 160-4.5 MCG/ACT Inhalation Aer CHOLECYSTECTOMY  12/14: COLON SURGERY      Comment: removed 10 inches of colon for diverticulitis  11/14: COLONOSCOPY      Comment: diverticulitis  1994: HYSTERECTOMY      Comment: LAV for endometriosis (Ovaries left intact)  1996: LEG/ANKLE SURGERY PROC

## 2018-01-15 RX ORDER — ATORVASTATIN CALCIUM 40 MG/1
TABLET, FILM COATED ORAL
Qty: 90 TABLET | Refills: 0 | Status: SHIPPED | OUTPATIENT
Start: 2018-01-15 | End: 2018-04-16

## 2018-01-15 RX ORDER — BUPROPION HYDROCHLORIDE 75 MG/1
TABLET ORAL
Qty: 180 TABLET | Refills: 0 | Status: SHIPPED | OUTPATIENT
Start: 2018-01-15 | End: 2018-04-30

## 2018-01-15 RX ORDER — OMEPRAZOLE 40 MG/1
CAPSULE, DELAYED RELEASE ORAL
Qty: 90 CAPSULE | Refills: 0 | Status: SHIPPED | OUTPATIENT
Start: 2018-01-15 | End: 2018-04-16

## 2018-01-15 RX ORDER — DAPAGLIFLOZIN 10 MG/1
TABLET, FILM COATED ORAL
Qty: 90 TABLET | Refills: 0 | Status: SHIPPED | OUTPATIENT
Start: 2018-01-15 | End: 2018-04-13

## 2018-01-15 NOTE — TELEPHONE ENCOUNTER
farxiga 10.17/17 90 day   Bupropion  Last script 11/6/17  90 day  Omeprazole 10/17/17  90 day  Atorvastatin  10/17/17  90 day     Last labs Oct 2017   Last OV 1/11/18

## 2018-02-01 ENCOUNTER — TELEPHONE (OUTPATIENT)
Dept: FAMILY MEDICINE CLINIC | Facility: CLINIC | Age: 58
End: 2018-02-01

## 2018-02-01 NOTE — TELEPHONE ENCOUNTER
Patient said that she has had diarrhea since yesterday and she has not been able to go to work. She is taking Pepto Bismol and it is helping but she wants a note excusing her for today. She will print off my chart.

## 2018-02-08 ENCOUNTER — OFFICE VISIT (OUTPATIENT)
Dept: FAMILY MEDICINE CLINIC | Facility: CLINIC | Age: 58
End: 2018-02-08

## 2018-02-08 VITALS — TEMPERATURE: 98 F | SYSTOLIC BLOOD PRESSURE: 138 MMHG | HEART RATE: 76 BPM | DIASTOLIC BLOOD PRESSURE: 80 MMHG

## 2018-02-08 DIAGNOSIS — E11.22 UNCONTROLLED TYPE 2 DIABETES MELLITUS WITH CHRONIC KIDNEY DISEASE, WITH LONG-TERM CURRENT USE OF INSULIN, UNSPECIFIED CKD STAGE: ICD-10-CM

## 2018-02-08 DIAGNOSIS — Z79.4 UNCONTROLLED TYPE 2 DIABETES MELLITUS WITH CHRONIC KIDNEY DISEASE, WITH LONG-TERM CURRENT USE OF INSULIN, UNSPECIFIED CKD STAGE: ICD-10-CM

## 2018-02-08 DIAGNOSIS — M67.442 GANGLION CYST OF FLEXOR TENDON SHEATH OF FINGER OF LEFT HAND: Primary | ICD-10-CM

## 2018-02-08 DIAGNOSIS — E11.65 UNCONTROLLED TYPE 2 DIABETES MELLITUS WITH CHRONIC KIDNEY DISEASE, WITH LONG-TERM CURRENT USE OF INSULIN, UNSPECIFIED CKD STAGE: ICD-10-CM

## 2018-02-08 PROCEDURE — 99213 OFFICE O/P EST LOW 20 MIN: CPT | Performed by: INTERNAL MEDICINE

## 2018-02-08 NOTE — PROGRESS NOTES
Bairon Rai is a 61year old female. HPI:   Pt has been having pain and numbness in the left hand and between 3rd and 4th finger. Has been there a month that she knows of.      Current Outpatient Prescriptions:  ATORVASTATIN 40 MG Oral Tab TAKE 1 TABLE feels well otherwise  SKIN: denies any unusual skin lesions or rashes  RESPIRATORY: denies shortness of breath with exertion  CARDIOVASCULAR: denies chest pain on exertion  GI: denies abdominal pain and denies heartburn  NEURO: denies headaches    EXAM:

## 2018-02-09 PROBLEM — R22.32 MASS OF FINGER OF LEFT HAND: Status: RESOLVED | Noted: 2018-02-09 | Resolved: 2018-02-09

## 2018-02-09 PROBLEM — R22.32 MASS OF FINGER OF LEFT HAND: Status: ACTIVE | Noted: 2018-02-09

## 2018-02-09 PROBLEM — R22.32 MASS OF HAND, LEFT: Status: ACTIVE | Noted: 2018-02-09

## 2018-02-14 ENCOUNTER — TELEPHONE (OUTPATIENT)
Dept: FAMILY MEDICINE CLINIC | Facility: CLINIC | Age: 58
End: 2018-02-14

## 2018-02-15 NOTE — TELEPHONE ENCOUNTER
RETURNED A CALL,   BUT FMLA IS FOR GALLBLADDER NOT FUNCTIONING RIGHT.    CURRENT FMLA IS UP ON  2/20/18   PLEASE ADVISE

## 2018-02-15 NOTE — TELEPHONE ENCOUNTER
Patient advised. She said that if she has a gallbladder attack then they will not allow her to be excused and she can be fired. She said that sometimes she has nausea and pain after she eats.  Advised that Dr Juan Antonio Herrera will likely send her to a surgeon, jin levine

## 2018-02-15 NOTE — TELEPHONE ENCOUNTER
I know you like to have FMLA, BUT you really dont qualify. If you are hospitalized I would reconsider, but the treatment for GB dysfunction is removal. If it gets to that point I will fill out FMLA.

## 2018-02-18 DIAGNOSIS — I10 ESSENTIAL HYPERTENSION, BENIGN: ICD-10-CM

## 2018-02-19 RX ORDER — LOSARTAN POTASSIUM AND HYDROCHLOROTHIAZIDE 12.5; 5 MG/1; MG/1
TABLET ORAL
Qty: 90 TABLET | Refills: 0 | Status: SHIPPED | OUTPATIENT
Start: 2018-02-19 | End: 2018-05-24

## 2018-02-19 NOTE — TELEPHONE ENCOUNTER
Patient advised, also advised that Dr Moi Newman said if she even has one gallbladder flare she needs to go to the ER. Paperwork faxed to patient's employer [phone] and copy left up front for her to .

## 2018-02-19 NOTE — TELEPHONE ENCOUNTER
Last office visit: 2/9/2018  Last CMP:  10/2/2017  Last B/P taken 2/8/2018: 138/80    Last refill: 10/17/2017    Pending Prescriptions Disp Refills    LOSARTAN POTASSIUM-HCTZ 50-12.5 MG Oral Tab [Pharmacy Med Name: LOSARTAN/HCTZ 50/12.5MG TABLETS] 90 tablet 0     Sig: TAKE 1 TABLET BY MOUTH EVERY MORNING         Future Appointments  Date Time Provider [facility]   2/22/2018 8:30 AM Adelita Santos MD [id number] [facility] 59 MSK DG   2/26/2018 10:30 AM Adelita Santos MD MMO NP [facility]TH[facility]   4/23/2018 1:00 PM [facility] DIGITAL MAMMO 1 [facility] AT [facility]   4/23/2018 3:15 PM Alejandra Fenton MD SPO[facility]Y [facility]

## 2018-02-22 ENCOUNTER — OFFICE VISIT (OUTPATIENT)
Dept: FAMILY MEDICINE CLINIC | Facility: CLINIC | Age: 58
End: 2018-02-22

## 2018-02-22 VITALS
OXYGEN SATURATION: 97 % | DIASTOLIC BLOOD PRESSURE: 88 MMHG | SYSTOLIC BLOOD PRESSURE: 136 MMHG | TEMPERATURE: 99 F | HEART RATE: 94 BPM

## 2018-02-22 DIAGNOSIS — J01.00 SUBACUTE MAXILLARY SINUSITIS: Primary | ICD-10-CM

## 2018-02-22 PROCEDURE — 99214 OFFICE O/P EST MOD 30 MIN: CPT | Performed by: INTERNAL MEDICINE

## 2018-02-22 RX ORDER — CEPHALEXIN 500 MG/1
500 CAPSULE ORAL 3 TIMES DAILY
Qty: 30 CAPSULE | Refills: 0 | Status: SHIPPED | OUTPATIENT
Start: 2018-02-22 | End: 2018-03-04

## 2018-02-22 NOTE — PROGRESS NOTES
HPI:   Yuri Houser is a 61year old female who presents for upper respiratory symptoms for  2  weeks. Patient reports congestion, dry cough, sinus pain.       Current Outpatient Prescriptions:  LOSARTAN POTASSIUM-HCTZ 50-12.5 MG Oral Tab TAKE 1 TABLET BY SURGERY BILAT  No date: OTHER      Comment: \"BRACHIAL CEFT (LEFT?)CYST\"  2012: OTHER SURGICAL HISTORY      Comment: cyst on back removed   Family History   Problem Relation Age of Onset   • High Blood Pressure Mother    • High Blood Pressure Father    •

## 2018-02-26 ENCOUNTER — TELEPHONE (OUTPATIENT)
Dept: FAMILY MEDICINE CLINIC | Facility: CLINIC | Age: 58
End: 2018-02-26

## 2018-02-26 NOTE — TELEPHONE ENCOUNTER
Patient said that her company denied the [facility] for the gallbladder. She said in order for them to approve it then it has to say a specific duration (not sporadic) maybe 1-2 days per month.

## 2018-04-12 ENCOUNTER — OFFICE VISIT (OUTPATIENT)
Dept: FAMILY MEDICINE CLINIC | Facility: CLINIC | Age: 58
End: 2018-04-12

## 2018-04-12 VITALS
DIASTOLIC BLOOD PRESSURE: 86 MMHG | SYSTOLIC BLOOD PRESSURE: 136 MMHG | TEMPERATURE: 97 F | HEART RATE: 94 BPM | OXYGEN SATURATION: 96 %

## 2018-04-12 DIAGNOSIS — J01.00 SUBACUTE MAXILLARY SINUSITIS: Primary | ICD-10-CM

## 2018-04-12 DIAGNOSIS — Z79.4 UNCONTROLLED TYPE 2 DIABETES MELLITUS WITH DIABETIC NEPHROPATHY, WITH LONG-TERM CURRENT USE OF INSULIN (HCC): ICD-10-CM

## 2018-04-12 DIAGNOSIS — E11.21 UNCONTROLLED TYPE 2 DIABETES MELLITUS WITH DIABETIC NEPHROPATHY, WITH LONG-TERM CURRENT USE OF INSULIN (HCC): ICD-10-CM

## 2018-04-12 DIAGNOSIS — E11.65 UNCONTROLLED TYPE 2 DIABETES MELLITUS WITH DIABETIC NEPHROPATHY, WITH LONG-TERM CURRENT USE OF INSULIN (HCC): ICD-10-CM

## 2018-04-12 PROCEDURE — 83036 HEMOGLOBIN GLYCOSYLATED A1C: CPT | Performed by: INTERNAL MEDICINE

## 2018-04-12 PROCEDURE — 99214 OFFICE O/P EST MOD 30 MIN: CPT | Performed by: INTERNAL MEDICINE

## 2018-04-12 PROCEDURE — 36415 COLL VENOUS BLD VENIPUNCTURE: CPT | Performed by: INTERNAL MEDICINE

## 2018-04-12 PROCEDURE — 80053 COMPREHEN METABOLIC PANEL: CPT | Performed by: INTERNAL MEDICINE

## 2018-04-12 RX ORDER — CIPROFLOXACIN 500 MG/1
500 TABLET, FILM COATED ORAL 2 TIMES DAILY
Qty: 20 TABLET | Refills: 0 | Status: SHIPPED | OUTPATIENT
Start: 2018-04-12 | End: 2018-07-31

## 2018-04-16 RX ORDER — DAPAGLIFLOZIN 10 MG/1
10 TABLET, FILM COATED ORAL DAILY
Qty: 30 TABLET | Refills: 0 | Status: SHIPPED | OUTPATIENT
Start: 2018-04-16 | End: 2018-05-22

## 2018-04-16 RX ORDER — DAPAGLIFLOZIN 10 MG/1
1 TABLET, FILM COATED ORAL DAILY
Refills: 0 | COMMUNITY
Start: 2018-01-17 | End: 2018-04-16

## 2018-04-16 NOTE — TELEPHONE ENCOUNTER
Pt said she cannot get in with diabetes doctor until May 7th. She will be out of meds, can she get a months worth of refills?

## 2018-04-30 RX ORDER — BUPROPION HYDROCHLORIDE 75 MG/1
TABLET ORAL
Qty: 180 TABLET | Refills: 0 | Status: SHIPPED | OUTPATIENT
Start: 2018-04-30 | End: 2018-07-29

## 2018-04-30 NOTE — TELEPHONE ENCOUNTER
Last office visit: 4/12/2018    Last refill: 1/15/2018    Pending Prescriptions Disp Refills    BUPROPION HCL 75 MG Oral Tab [Pharmacy Med Name: BUPROPION 75MG TABLETS] 180 tablet 0     Sig: TAKE 1 TABLET(75 MG) BY MOUTH TWICE DAILY         Future Appointm

## 2018-05-07 ENCOUNTER — OFFICE VISIT (OUTPATIENT)
Dept: ENDOCRINOLOGY CLINIC | Facility: CLINIC | Age: 58
End: 2018-05-07

## 2018-05-07 VITALS
WEIGHT: 196 LBS | RESPIRATION RATE: 18 BRPM | DIASTOLIC BLOOD PRESSURE: 72 MMHG | BODY MASS INDEX: 32.65 KG/M2 | TEMPERATURE: 98 F | SYSTOLIC BLOOD PRESSURE: 124 MMHG | HEART RATE: 94 BPM | HEIGHT: 65 IN

## 2018-05-07 DIAGNOSIS — N18.2 UNCONTROLLED TYPE 2 DIABETES MELLITUS WITH STAGE 2 CHRONIC KIDNEY DISEASE, WITHOUT LONG-TERM CURRENT USE OF INSULIN (HCC): Primary | ICD-10-CM

## 2018-05-07 DIAGNOSIS — E11.65 UNCONTROLLED TYPE 2 DIABETES MELLITUS WITH STAGE 2 CHRONIC KIDNEY DISEASE, WITHOUT LONG-TERM CURRENT USE OF INSULIN (HCC): Primary | ICD-10-CM

## 2018-05-07 DIAGNOSIS — E11.22 UNCONTROLLED TYPE 2 DIABETES MELLITUS WITH STAGE 2 CHRONIC KIDNEY DISEASE, WITHOUT LONG-TERM CURRENT USE OF INSULIN (HCC): Primary | ICD-10-CM

## 2018-05-07 PROCEDURE — 99215 OFFICE O/P EST HI 40 MIN: CPT | Performed by: NURSE PRACTITIONER

## 2018-05-07 RX ORDER — GLIPIZIDE 10 MG/1
10 TABLET, FILM COATED, EXTENDED RELEASE ORAL DAILY
Qty: 30 TABLET | Refills: 0 | Status: SHIPPED | OUTPATIENT
Start: 2018-05-07 | End: 2018-05-22

## 2018-05-07 RX ORDER — METFORMIN HYDROCHLORIDE 500 MG/1
500 TABLET, EXTENDED RELEASE ORAL 2 TIMES DAILY WITH MEALS
Qty: 60 TABLET | Refills: 0 | Status: SHIPPED | OUTPATIENT
Start: 2018-05-07 | End: 2018-06-18

## 2018-05-07 NOTE — PROGRESS NOTES
Diabetes Education:    Yola Lepe does not test glucose at home. She states that if she did, she would not be able to type. Inserted Freestyle Galilea CGM in pt's right arm. Lot [id number]       [id number]     Exp 10/31/2018  Explained how sensor works.   Sh

## 2018-05-07 NOTE — PATIENT INSTRUCTIONS
Stopping glimiperide   Continue farxiga every am   Start metformin 1 tablet after dinner daily   Start glipizide 1 tablet before breakfast daily   Return in 2 weeks with readings Dotty Pea

## 2018-05-07 NOTE — PROGRESS NOTES
CC: Patient presents with:  Diabetes: new pt. referred by PCP. pt has meter at home but does not check her blood sugar.       HISTORY:  Past Medical History:   Diagnosis Date   • Allergic rhinitis due to pollen 5/7/2012   • Diverticulosis of colon (without she has not been able to achieve control of DM for a long time. Has needle phobia and does not test BG due to this and is very fearful of injections. Hx GDM.    Diabetes information        Lab Results  Component Value Date    (H) 04/12/2018   A1C 9.9 total) by mouth daily. , Disp: 30 tablet, Rfl: 0  •  BUPROPION HCL 75 MG Oral Tab, TAKE 1 TABLET(75 MG) BY MOUTH TWICE DAILY, Disp: 180 tablet, Rfl: 0  •  Multiple Vitamins-Minerals (CENTRUM SILVER) Oral Tab, Take 1 tablet by mouth daily. , Disp: , Rfl:   • Pulmonary/Chest: Effort normal and breath sounds normal. No respiratory distress. No wheezes, rhonchi or rales   Abdominal: Soft. Bowel sounds are normal. Non tender, no masses, no lipodystrophy. Skin: Skin is warm and dry. No rash noted. No erythema. Maintenance  Up to date on annual labs needs lipid, cmp, micro  Ophtho / dilated eye exam: needs fov   Albumin/Cr ratio: + proteinuria on arb goal glycemic control   Monofilament exam: today   BP: at goal cont meds   Lipids: not at goal recheck cont statin

## 2018-05-16 ENCOUNTER — OFFICE VISIT (OUTPATIENT)
Dept: FAMILY MEDICINE CLINIC | Facility: CLINIC | Age: 58
End: 2018-05-16

## 2018-05-16 VITALS
TEMPERATURE: 98 F | DIASTOLIC BLOOD PRESSURE: 84 MMHG | SYSTOLIC BLOOD PRESSURE: 132 MMHG | OXYGEN SATURATION: 98 % | HEART RATE: 97 BPM | RESPIRATION RATE: 20 BRPM

## 2018-05-16 DIAGNOSIS — L25.9 CONTACT DERMATITIS, UNSPECIFIED CONTACT DERMATITIS TYPE, UNSPECIFIED TRIGGER: Primary | ICD-10-CM

## 2018-05-16 PROCEDURE — 99213 OFFICE O/P EST LOW 20 MIN: CPT | Performed by: NURSE PRACTITIONER

## 2018-05-16 NOTE — PROGRESS NOTES
CHIEF COMPLAINT:   No chief complaint on file. HPI:   Giacomo Bonds is a 61year old female who presents for evaluation of a rash. Per patient rash started in the past 3 days. Rash has been slightly improved since onset.   Patient has not had kelli FARXIGA 10 MG Oral Tab Take 10 mg by mouth daily.  Disp: 30 tablet Rfl: 0   LOSARTAN POTASSIUM-HCTZ 50-12.5 MG Oral Tab TAKE 1 TABLET BY MOUTH EVERY MORNING Disp: 90 tablet Rfl: 0   Blood Glucose Monitoring Suppl (ONETOUCH ULTRA 2) w/Device Does not apply K Smokeless tobacco: Never Used                      Comment: CURRENT NON SMOKER  Alcohol use: No                  REVIEW OF SYSTEMS:   GENERAL: feels well otherwise, no fever, no chills. SKIN: Per HPI. No edema. No ulcerations.   CARDIOVASCULAR: Denies ches Instructions for this visit  Patient Instructions       Understanding Contact Dermatitis     A cool, moist compress can help reduce itching. Contact dermatitis is a common type of skin rash.  It’s caused by something that touches the skin and makes it i · Cool, moist compress. Use a clean damp cloth. Put it on the area for 20 to 30 minutes, 5 to 6 times a day for the first 3 days. · Steroid cream or ointment. You can apply this medicine several times a day on clean skin. · Oral corticosteroid.  Your heal

## 2018-05-21 ENCOUNTER — DIABETIC EDUCATION (OUTPATIENT)
Dept: ENDOCRINOLOGY CLINIC | Facility: CLINIC | Age: 58
End: 2018-05-21

## 2018-05-21 DIAGNOSIS — E11.22 UNCONTROLLED TYPE 2 DIABETES MELLITUS WITH STAGE 2 CHRONIC KIDNEY DISEASE, WITHOUT LONG-TERM CURRENT USE OF INSULIN (HCC): Primary | ICD-10-CM

## 2018-05-21 DIAGNOSIS — N18.2 UNCONTROLLED TYPE 2 DIABETES MELLITUS WITH STAGE 2 CHRONIC KIDNEY DISEASE, WITHOUT LONG-TERM CURRENT USE OF INSULIN (HCC): Primary | ICD-10-CM

## 2018-05-21 DIAGNOSIS — E11.65 UNCONTROLLED TYPE 2 DIABETES MELLITUS WITH STAGE 2 CHRONIC KIDNEY DISEASE, WITHOUT LONG-TERM CURRENT USE OF INSULIN (HCC): Primary | ICD-10-CM

## 2018-05-21 PROCEDURE — G0108 DIAB MANAGE TRN  PER INDIV: HCPCS | Performed by: DIETITIAN, REGISTERED

## 2018-05-21 NOTE — PROGRESS NOTES
Shauna Salazar  EED2/79/4230 was seen for Continuous Glucose Sensor Follow-up Visit:    Date: 5/21/2018  Start time: 9:30 End time: 10:00      Sensor Type: Freestyle Galilea     Reviewed CGMS download and patient logs:  Days of sensor use: 14  Average glucos

## 2018-05-22 ENCOUNTER — TELEPHONE (OUTPATIENT)
Dept: ENDOCRINOLOGY CLINIC | Facility: CLINIC | Age: 58
End: 2018-05-22

## 2018-05-22 ENCOUNTER — HOSPITAL ENCOUNTER (EMERGENCY)
Facility: HOSPITAL | Age: 58
Discharge: HOME | End: 2018-05-22
Attending: EMERGENCY MEDICINE
Payer: COMMERCIAL

## 2018-05-22 VITALS
RESPIRATION RATE: 20 BRPM | TEMPERATURE: 99 F | HEIGHT: 65 IN | DIASTOLIC BLOOD PRESSURE: 70 MMHG | OXYGEN SATURATION: 96 % | HEART RATE: 92 BPM | WEIGHT: 195 LBS | BODY MASS INDEX: 32.49 KG/M2 | SYSTOLIC BLOOD PRESSURE: 131 MMHG

## 2018-05-22 DIAGNOSIS — N18.2 UNCONTROLLED TYPE 2 DIABETES MELLITUS WITH STAGE 2 CHRONIC KIDNEY DISEASE, WITHOUT LONG-TERM CURRENT USE OF INSULIN (HCC): Primary | ICD-10-CM

## 2018-05-22 DIAGNOSIS — E11.22 UNCONTROLLED TYPE 2 DIABETES MELLITUS WITH STAGE 2 CHRONIC KIDNEY DISEASE, WITHOUT LONG-TERM CURRENT USE OF INSULIN (HCC): Primary | ICD-10-CM

## 2018-05-22 DIAGNOSIS — K52.9 GASTROENTERITIS: Primary | ICD-10-CM

## 2018-05-22 DIAGNOSIS — E11.65 UNCONTROLLED TYPE 2 DIABETES MELLITUS WITH STAGE 2 CHRONIC KIDNEY DISEASE, WITHOUT LONG-TERM CURRENT USE OF INSULIN (HCC): Primary | ICD-10-CM

## 2018-05-22 PROCEDURE — 80053 COMPREHEN METABOLIC PANEL: CPT | Performed by: EMERGENCY MEDICINE

## 2018-05-22 PROCEDURE — 96372 THER/PROPH/DIAG INJ SC/IM: CPT

## 2018-05-22 PROCEDURE — 82962 GLUCOSE BLOOD TEST: CPT

## 2018-05-22 PROCEDURE — 96361 HYDRATE IV INFUSION ADD-ON: CPT

## 2018-05-22 PROCEDURE — 80053 COMPREHEN METABOLIC PANEL: CPT

## 2018-05-22 PROCEDURE — 99284 EMERGENCY DEPT VISIT MOD MDM: CPT

## 2018-05-22 PROCEDURE — 85025 COMPLETE CBC W/AUTO DIFF WBC: CPT | Performed by: EMERGENCY MEDICINE

## 2018-05-22 PROCEDURE — 96374 THER/PROPH/DIAG INJ IV PUSH: CPT

## 2018-05-22 PROCEDURE — 85025 COMPLETE CBC W/AUTO DIFF WBC: CPT

## 2018-05-22 RX ORDER — DICYCLOMINE HCL 20 MG
20 TABLET ORAL 3 TIMES DAILY
Qty: 20 TABLET | Refills: 0 | Status: SHIPPED | OUTPATIENT
Start: 2018-05-22 | End: 2018-07-06 | Stop reason: ALTCHOICE

## 2018-05-22 RX ORDER — ONDANSETRON 4 MG/1
4 TABLET, ORALLY DISINTEGRATING ORAL EVERY 4 HOURS PRN
Qty: 20 TABLET | Refills: 0 | Status: SHIPPED | OUTPATIENT
Start: 2018-05-22 | End: 2018-05-27

## 2018-05-22 RX ORDER — DAPAGLIFLOZIN 10 MG/1
10 TABLET, FILM COATED ORAL DAILY
Qty: 30 TABLET | Refills: 0 | Status: CANCELLED | OUTPATIENT
Start: 2018-05-22

## 2018-05-22 RX ORDER — DAPAGLIFLOZIN 10 MG/1
10 TABLET, FILM COATED ORAL DAILY
Qty: 30 TABLET | Refills: 0 | Status: SHIPPED | OUTPATIENT
Start: 2018-05-22 | End: 2018-06-18

## 2018-05-22 RX ORDER — GLIPIZIDE 10 MG/1
10 TABLET, FILM COATED, EXTENDED RELEASE ORAL 2 TIMES DAILY
Qty: 60 TABLET | Refills: 0 | Status: SHIPPED | OUTPATIENT
Start: 2018-05-22 | End: 2018-06-18

## 2018-05-22 RX ORDER — POTASSIUM CHLORIDE 20 MEQ/1
40 TABLET, EXTENDED RELEASE ORAL ONCE
Status: COMPLETED | OUTPATIENT
Start: 2018-05-22 | End: 2018-05-22

## 2018-05-22 RX ORDER — DICYCLOMINE HYDROCHLORIDE 10 MG/ML
20 INJECTION INTRAMUSCULAR ONCE
Status: COMPLETED | OUTPATIENT
Start: 2018-05-22 | End: 2018-05-22

## 2018-05-22 RX ORDER — ONDANSETRON 2 MG/ML
4 INJECTION INTRAMUSCULAR; INTRAVENOUS ONCE
Status: COMPLETED | OUTPATIENT
Start: 2018-05-22 | End: 2018-05-22

## 2018-05-22 NOTE — TELEPHONE ENCOUNTER
Called pt and told her to increase glipizide to BID per SC. Clayton Madrigal stated that she also needs refill of Farxiga.

## 2018-05-22 NOTE — ED INITIAL ASSESSMENT (HPI)
60 y/o female to ED with c/o of vomiting and diarrhea. Patient reports symptoms started around 0700 this AM. Patient denies fevers.

## 2018-05-22 NOTE — ED PROVIDER NOTES
Patient Seen in: [facility] Emergency Department    History   Patient presents with:  Nausea/Vomiting/Diarrhea (gastrointestinal)    Stated Complaint: vomiting/diarrhea    HPI    Patient is a 79-year-old female comes emergency room for reduction of Systems    Positive for stated complaint: vomiting/diarrhea  Other systems are as noted in HPI. Constitutional and vital signs reviewed. All other systems reviewed and negative except as noted above.     Physical Exam   ED Triage Vitals [05/22/18 1354 Absolute 8.87 (*)     All other components within normal limits   CBC WITH DIFFERENTIAL WITH PLATELET    Narrative: The following orders were created for panel order CBC WITH DIFFERENTIAL WITH PLATELET.   Procedure                               Abnormal discussed the case with the patient and they had no questions, complaints, or concerns. Patient felt comfortable going home.                  Disposition and Plan     Clinical Impression:  Gastroenteritis  (primary encounter diagnosis)  Hypokalemia    Disp

## 2018-05-23 ENCOUNTER — TELEPHONE (OUTPATIENT)
Dept: FAMILY MEDICINE CLINIC | Facility: CLINIC | Age: 58
End: 2018-05-23

## 2018-05-23 NOTE — TELEPHONE ENCOUNTER
Pt was at [facility] ER last night for horrible vomiting and diarrhea. Would like to talk to nurse.

## 2018-05-23 NOTE — TELEPHONE ENCOUNTER
Vomiting stopped before she was allowed to leave the er. No further diarrhea. Stayed home from work today, needs a note. Someone will p/u for her. Spouse was seen also with the same sx. They are wondering if maybe they both had food poisoning.   Letter prin

## 2018-05-24 DIAGNOSIS — I10 ESSENTIAL HYPERTENSION, BENIGN: ICD-10-CM

## 2018-05-24 RX ORDER — LOSARTAN POTASSIUM AND HYDROCHLOROTHIAZIDE 12.5; 5 MG/1; MG/1
TABLET ORAL
Qty: 90 TABLET | Refills: 0 | Status: SHIPPED | OUTPATIENT
Start: 2018-05-24 | End: 2018-08-21

## 2018-05-29 ENCOUNTER — TELEPHONE (OUTPATIENT)
Dept: INTERNAL MEDICINE CLINIC | Facility: CLINIC | Age: 58
End: 2018-05-29

## 2018-05-29 NOTE — TELEPHONE ENCOUNTER
She can wait, make sure she is will hydrated as this will worsen BG   And watch BG more closely.  Hold metformin, and if able take glipizide does not cause gi issues   Thanks  Nerissa WILCOX,CDE

## 2018-05-29 NOTE — TELEPHONE ENCOUNTER
SC and Freda switched Pt's med's, was asked to take 2 pills  of GLYPIZIDE, up from one pill   Pt caught either the stomach flu or food poisining but has not been able to make the change. Pt went to ED for diarrhea and vomiting.      Pt has appt 5-18, 2018

## 2018-05-30 NOTE — TELEPHONE ENCOUNTER
Pt called talk to her told her it was ok to take farxiga in the morning and metformin and glipizide in the afternoon.

## 2018-06-18 ENCOUNTER — APPOINTMENT (OUTPATIENT)
Dept: LAB | Age: 58
End: 2018-06-18
Attending: NURSE PRACTITIONER
Payer: COMMERCIAL

## 2018-06-18 ENCOUNTER — OFFICE VISIT (OUTPATIENT)
Dept: ENDOCRINOLOGY CLINIC | Facility: CLINIC | Age: 58
End: 2018-06-18

## 2018-06-18 VITALS
HEART RATE: 84 BPM | SYSTOLIC BLOOD PRESSURE: 120 MMHG | RESPIRATION RATE: 18 BRPM | TEMPERATURE: 98 F | HEIGHT: 65 IN | WEIGHT: 193 LBS | DIASTOLIC BLOOD PRESSURE: 70 MMHG | BODY MASS INDEX: 32.15 KG/M2

## 2018-06-18 DIAGNOSIS — E11.69 HYPERLIPIDEMIA ASSOCIATED WITH TYPE 2 DIABETES MELLITUS (HCC): Primary | ICD-10-CM

## 2018-06-18 DIAGNOSIS — E11.22 UNCONTROLLED TYPE 2 DIABETES MELLITUS WITH STAGE 2 CHRONIC KIDNEY DISEASE, WITHOUT LONG-TERM CURRENT USE OF INSULIN (HCC): ICD-10-CM

## 2018-06-18 DIAGNOSIS — E78.5 HYPERLIPIDEMIA ASSOCIATED WITH TYPE 2 DIABETES MELLITUS (HCC): Primary | ICD-10-CM

## 2018-06-18 DIAGNOSIS — E11.65 UNCONTROLLED TYPE 2 DIABETES MELLITUS WITH STAGE 2 CHRONIC KIDNEY DISEASE, WITHOUT LONG-TERM CURRENT USE OF INSULIN (HCC): ICD-10-CM

## 2018-06-18 DIAGNOSIS — E78.5 HYPERLIPIDEMIA ASSOCIATED WITH TYPE 2 DIABETES MELLITUS (HCC): ICD-10-CM

## 2018-06-18 DIAGNOSIS — N18.2 UNCONTROLLED TYPE 2 DIABETES MELLITUS WITH STAGE 2 CHRONIC KIDNEY DISEASE, WITHOUT LONG-TERM CURRENT USE OF INSULIN (HCC): ICD-10-CM

## 2018-06-18 DIAGNOSIS — E11.69 HYPERLIPIDEMIA ASSOCIATED WITH TYPE 2 DIABETES MELLITUS (HCC): ICD-10-CM

## 2018-06-18 PROCEDURE — 99213 OFFICE O/P EST LOW 20 MIN: CPT | Performed by: NURSE PRACTITIONER

## 2018-06-18 PROCEDURE — 82570 ASSAY OF URINE CREATININE: CPT

## 2018-06-18 PROCEDURE — 82043 UR ALBUMIN QUANTITATIVE: CPT

## 2018-06-18 PROCEDURE — 80053 COMPREHEN METABOLIC PANEL: CPT

## 2018-06-18 PROCEDURE — 80061 LIPID PANEL: CPT

## 2018-06-18 PROCEDURE — 83036 HEMOGLOBIN GLYCOSYLATED A1C: CPT | Performed by: NURSE PRACTITIONER

## 2018-06-18 RX ORDER — FLASH GLUCOSE SENSOR
3 KIT MISCELLANEOUS
Qty: 3 EACH | Refills: 11 | Status: SHIPPED | OUTPATIENT
Start: 2018-06-18

## 2018-06-18 RX ORDER — DAPAGLIFLOZIN 10 MG/1
10 TABLET, FILM COATED ORAL DAILY
Qty: 30 TABLET | Refills: 2 | Status: SHIPPED | OUTPATIENT
Start: 2018-06-18 | End: 2018-09-13

## 2018-06-18 RX ORDER — GLIPIZIDE 10 MG/1
10 TABLET, FILM COATED, EXTENDED RELEASE ORAL 2 TIMES DAILY
Qty: 60 TABLET | Refills: 0 | Status: SHIPPED | OUTPATIENT
Start: 2018-06-18 | End: 2018-07-16

## 2018-06-18 RX ORDER — METFORMIN HYDROCHLORIDE 500 MG/1
500 TABLET, EXTENDED RELEASE ORAL 2 TIMES DAILY WITH MEALS
Qty: 60 TABLET | Refills: 0 | Status: SHIPPED | OUTPATIENT
Start: 2018-06-18 | End: 2018-07-18

## 2018-06-18 RX ORDER — GLIPIZIDE 10 MG/1
10 TABLET, FILM COATED, EXTENDED RELEASE ORAL 2 TIMES DAILY
Qty: 60 TABLET | Refills: 0 | Status: SHIPPED | OUTPATIENT
Start: 2018-06-18 | End: 2018-06-18

## 2018-06-18 RX ORDER — DAPAGLIFLOZIN 10 MG/1
10 TABLET, FILM COATED ORAL DAILY
Qty: 30 TABLET | Refills: 0 | Status: SHIPPED | OUTPATIENT
Start: 2018-06-18 | End: 2018-06-18

## 2018-06-18 NOTE — PROGRESS NOTES
CC: Patient presents with:  Diabetes: follow up. pt forgot meter.       HISTORY:  Past Medical History:   Diagnosis Date   • Allergic rhinitis due to pollen 5/7/2012   • Diverticulosis of colon (without mention of hemorrhage) 12/15/2008   • Essential hypert times/month not constantly. Has had s/s hypoglycemia late pm and if she hasn't eaten in am. Spouse at visit. Diabetes information        Lab Results  Component Value Date    (H) 04/12/2018   A1C 9.9 (H) 04/12/2018       Diabetes Type: 2?   Duration tablet, Rfl: 0  •  FARXIGA 10 MG Oral Tab, Take 10 mg by mouth daily. , Disp: 30 tablet, Rfl: 0  •  Dicyclomine HCl 20 MG Oral Tab, Take 1 tablet (20 mg total) by mouth 3 (three) times daily. , Disp: 20 tablet, Rfl: 0  •  triamcinolone acetonide 0.1 % Extern without long-term current use of insulin (hcc)  (primary encounter diagnosis): plan continue metformin er 500 mg q pm glipizide er 10 mg ac meals bid, get personal seun and see Shukri Maddox RN,CDE in [facility] to learn how to apply and use, return to see her aft

## 2018-06-19 DIAGNOSIS — E11.69 HYPERLIPIDEMIA ASSOCIATED WITH TYPE 2 DIABETES MELLITUS (HCC): Primary | ICD-10-CM

## 2018-06-19 DIAGNOSIS — E78.5 HYPERLIPIDEMIA ASSOCIATED WITH TYPE 2 DIABETES MELLITUS (HCC): Primary | ICD-10-CM

## 2018-06-19 RX ORDER — ATORVASTATIN CALCIUM 80 MG/1
80 TABLET, FILM COATED ORAL NIGHTLY
Qty: 90 TABLET | Refills: 0 | COMMUNITY
Start: 2018-06-19 | End: 2018-07-12

## 2018-07-02 ENCOUNTER — NURSE ONLY (OUTPATIENT)
Dept: ENDOCRINOLOGY CLINIC | Facility: CLINIC | Age: 58
End: 2018-07-02

## 2018-07-02 DIAGNOSIS — E11.22 UNCONTROLLED TYPE 2 DIABETES MELLITUS WITH STAGE 2 CHRONIC KIDNEY DISEASE, WITHOUT LONG-TERM CURRENT USE OF INSULIN (HCC): ICD-10-CM

## 2018-07-02 DIAGNOSIS — N18.2 UNCONTROLLED TYPE 2 DIABETES MELLITUS WITH STAGE 2 CHRONIC KIDNEY DISEASE, WITHOUT LONG-TERM CURRENT USE OF INSULIN (HCC): ICD-10-CM

## 2018-07-02 DIAGNOSIS — E11.65 UNCONTROLLED TYPE 2 DIABETES MELLITUS WITH STAGE 2 CHRONIC KIDNEY DISEASE, WITHOUT LONG-TERM CURRENT USE OF INSULIN (HCC): ICD-10-CM

## 2018-07-02 PROCEDURE — 95250 CONT GLUC MNTR PHYS/QHP EQP: CPT | Performed by: DIETITIAN, REGISTERED

## 2018-07-02 NOTE — PROGRESS NOTES
Shantelle Shultz  [id number] was seen for Personal Continuous Glucose Monitoring Training/Start:  Pt.  Accompanied by  to visit  Date: 7/2/2018  Start time: 10:30am End time: 11:30am    Sensor Type: Freestyle [employer]    Freestyle Wu Pro  Senso

## 2018-07-06 ENCOUNTER — TELEPHONE (OUTPATIENT)
Dept: FAMILY MEDICINE CLINIC | Facility: CLINIC | Age: 58
End: 2018-07-06

## 2018-07-06 ENCOUNTER — OFFICE VISIT (OUTPATIENT)
Dept: FAMILY MEDICINE CLINIC | Facility: CLINIC | Age: 58
End: 2018-07-06

## 2018-07-06 VITALS
BODY MASS INDEX: 32.15 KG/M2 | HEART RATE: 74 BPM | OXYGEN SATURATION: 98 % | TEMPERATURE: 98 F | DIASTOLIC BLOOD PRESSURE: 82 MMHG | HEIGHT: 65 IN | RESPIRATION RATE: 18 BRPM | SYSTOLIC BLOOD PRESSURE: 126 MMHG | WEIGHT: 193 LBS

## 2018-07-06 DIAGNOSIS — J02.9 SORE THROAT: ICD-10-CM

## 2018-07-06 DIAGNOSIS — J03.90 ACUTE TONSILLITIS, UNSPECIFIED ETIOLOGY: Primary | ICD-10-CM

## 2018-07-06 LAB — CONTROL LINE PRESENT WITH A CLEAR BACKGROUND (YES/NO): YES YES/NO

## 2018-07-06 PROCEDURE — 87081 CULTURE SCREEN ONLY: CPT | Performed by: PHYSICIAN ASSISTANT

## 2018-07-06 PROCEDURE — 99213 OFFICE O/P EST LOW 20 MIN: CPT | Performed by: PHYSICIAN ASSISTANT

## 2018-07-06 PROCEDURE — 87880 STREP A ASSAY W/OPTIC: CPT | Performed by: PHYSICIAN ASSISTANT

## 2018-07-06 RX ORDER — CEPHALEXIN 500 MG/1
500 CAPSULE ORAL 2 TIMES DAILY
Qty: 20 CAPSULE | Refills: 0 | Status: SHIPPED | OUTPATIENT
Start: 2018-07-06 | End: 2018-07-16

## 2018-07-06 NOTE — PATIENT INSTRUCTIONS
1.  Keflex 500 mg twice daily for 10days.    2.  Encourage fluids, humidifier/vaporizor at bedside, elevate head of bed (sleep with extra pillow), vapor rub to chest, steam therapy if no fever, warm compresses for sinus pressure if no fever, salt water iliana · Take the antibiotic medicine for the full 10 days, even if you feel better. This is very important to make sure the infection is treated.  It is also important to prevent drug-resistant germs from developing. If you were given an antibiotic shot, you won' ¨ Your child is 3years old or older and has a fever of 100.4°F (38°C) that continues for more than 3 days.   · New or worsening ear pain, sinus pain, or headache  · Painful lumps in the back of neck  · Stiff neck  · Lymph nodes are getting larger  · Inabil

## 2018-07-06 NOTE — TELEPHONE ENCOUNTER
Pt started with ST and achy yesterday. Pt has been taking Zicam and Advil cold & sinus with no relief. There are no openings with any providers today. Pt was advised to go to [facility].

## 2018-07-06 NOTE — PROGRESS NOTES
CHIEF COMPLAINT:   Patient presents with:  Sore Throat: HA, bodyache x 2 days         HPI:   Govind Carter is a 61year old female presents to clinic with complaint of sore throat. Patient has had 2 days.  Symptoms have been worsening since onset with ac vitamin E 100 UNITS Oral Cap Take 100 Units by mouth daily. Disp:  Rfl:    Biotin 1 MG Oral Cap Take by mouth. Disp:  Rfl:    Omeprazole 40 MG Oral Capsule Delayed Release Take 1 capsule (40 mg total) by mouth daily.  Disp: 90 capsule Rfl: 0      Past Medic CARDIO: RRR without murmur  EXTREMITIES: no cyanosis, clubbing or edema  LYMPH: (+) anterior cervical. (+) submandibular lymphadenopathy. No posterior cervical or occipital lymphadenopathy.       Recent Results (from the past 24 hour(s))  -STREP A ASSAY W/ Pharyngitis (sore throat) is often due to a virus. It can also be caused by the streptococcus, or strep, bacterium, often called strep throat.  Both viral and strep infections can cause throat pain that is worse when swallowing, aching all over with headach · For children: Use acetaminophen for fever, fussiness, or discomfort.  In infants older than 10months of age, you may use ibuprofen instead of acetaminophen. Talk with your child's healthcare provider before giving these medicines if your child has chronic · Signs of dehydration (very dark urine or no urine, sunken eyes, dizziness)  · Trouble breathing or noisy breathing  · Muffled voice  · New rash  · Child appears to be getting sicker  Date Last Reviewed: 4/13/2015  © 0056-5252 [employer] [address].  8

## 2018-07-10 ENCOUNTER — TELEPHONE (OUTPATIENT)
Dept: FAMILY MEDICINE CLINIC | Facility: CLINIC | Age: 58
End: 2018-07-10

## 2018-07-10 DIAGNOSIS — E11.69 HYPERLIPIDEMIA ASSOCIATED WITH TYPE 2 DIABETES MELLITUS (HCC): ICD-10-CM

## 2018-07-10 DIAGNOSIS — E78.5 HYPERLIPIDEMIA ASSOCIATED WITH TYPE 2 DIABETES MELLITUS (HCC): ICD-10-CM

## 2018-07-10 RX ORDER — ATORVASTATIN CALCIUM 10 MG/1
10 TABLET, FILM COATED ORAL NIGHTLY
Qty: 90 TABLET | Refills: 3 | Status: SHIPPED | OUTPATIENT
Start: 2018-07-10 | End: 2018-07-12 | Stop reason: CLARIF

## 2018-07-10 RX ORDER — ATORVASTATIN CALCIUM 80 MG/1
80 TABLET, FILM COATED ORAL NIGHTLY
Qty: 90 TABLET | Refills: 0 | Status: CANCELLED | OUTPATIENT
Start: 2018-07-10

## 2018-07-10 NOTE — TELEPHONE ENCOUNTER
Patient said that she had a blood test one week ago and her Lipid was elevated so Nerissa increased one of her medications. She is due for a refill on her Atorvastatin she asked if she can get 80mg tablets.

## 2018-07-12 ENCOUNTER — TELEPHONE (OUTPATIENT)
Dept: FAMILY MEDICINE CLINIC | Facility: CLINIC | Age: 58
End: 2018-07-12

## 2018-07-12 DIAGNOSIS — E11.69 HYPERLIPIDEMIA ASSOCIATED WITH TYPE 2 DIABETES MELLITUS (HCC): ICD-10-CM

## 2018-07-12 DIAGNOSIS — E78.5 HYPERLIPIDEMIA ASSOCIATED WITH TYPE 2 DIABETES MELLITUS (HCC): ICD-10-CM

## 2018-07-12 RX ORDER — ATORVASTATIN CALCIUM 80 MG/1
80 TABLET, FILM COATED ORAL NIGHTLY
Qty: 90 TABLET | Refills: 0 | Status: SHIPPED | OUTPATIENT
Start: 2018-07-12 | End: 2018-10-04

## 2018-07-12 NOTE — TELEPHONE ENCOUNTER
atorvastatin 80 MG Oral Tab  10 mg was called in and it should be 80mg NOT 10mg. Call to [facility]   She did not  the 10mg. Because she knew it was wrong. She is out and needs them today.

## 2018-07-16 ENCOUNTER — NURSE ONLY (OUTPATIENT)
Dept: ENDOCRINOLOGY CLINIC | Facility: CLINIC | Age: 58
End: 2018-07-16

## 2018-07-16 VITALS — BODY MASS INDEX: 32 KG/M2 | DIASTOLIC BLOOD PRESSURE: 68 MMHG | SYSTOLIC BLOOD PRESSURE: 112 MMHG | WEIGHT: 190 LBS

## 2018-07-16 DIAGNOSIS — E78.5 HYPERLIPIDEMIA ASSOCIATED WITH TYPE 2 DIABETES MELLITUS (HCC): ICD-10-CM

## 2018-07-16 DIAGNOSIS — E11.22 UNCONTROLLED TYPE 2 DIABETES MELLITUS WITH STAGE 2 CHRONIC KIDNEY DISEASE, WITHOUT LONG-TERM CURRENT USE OF INSULIN (HCC): ICD-10-CM

## 2018-07-16 DIAGNOSIS — E11.69 HYPERLIPIDEMIA ASSOCIATED WITH TYPE 2 DIABETES MELLITUS (HCC): ICD-10-CM

## 2018-07-16 DIAGNOSIS — E11.65 UNCONTROLLED TYPE 2 DIABETES MELLITUS WITH STAGE 2 CHRONIC KIDNEY DISEASE, WITHOUT LONG-TERM CURRENT USE OF INSULIN (HCC): ICD-10-CM

## 2018-07-16 DIAGNOSIS — N18.2 UNCONTROLLED TYPE 2 DIABETES MELLITUS WITH STAGE 2 CHRONIC KIDNEY DISEASE, WITHOUT LONG-TERM CURRENT USE OF INSULIN (HCC): ICD-10-CM

## 2018-07-16 PROCEDURE — G0108 DIAB MANAGE TRN  PER INDIV: HCPCS | Performed by: DIETITIAN, REGISTERED

## 2018-07-16 RX ORDER — OMEPRAZOLE 40 MG/1
CAPSULE, DELAYED RELEASE ORAL
Qty: 90 CAPSULE | Refills: 0 | Status: SHIPPED | OUTPATIENT
Start: 2018-07-16 | End: 2018-10-04

## 2018-07-16 RX ORDER — GLIPIZIDE 10 MG/1
10 TABLET, FILM COATED, EXTENDED RELEASE ORAL
Qty: 60 TABLET | Refills: 3 | Status: SHIPPED | OUTPATIENT
Start: 2018-07-16 | End: 2018-11-27

## 2018-07-16 NOTE — PROGRESS NOTES
Giacomo Bonds  [id number] was seen for Continuous Glucose Sensor Follow-up Visit:    Date: 7/16/2018  Referring MD: Dr. Anisha Gomez Start time:11am End time: 12pm  Jessica March     Reviewed CGMS download and patient logs:  Days of sensor use: 10 d

## 2018-07-16 NOTE — TELEPHONE ENCOUNTER
Pt. here for [employer]. It is difficult to assess if pt. needs to take Glipizide ER 10 mg once or twice daily due to illness for past 2 wks, She will be out of medication soon. Please sign order pended.  Thanks, [employer]

## 2018-07-30 RX ORDER — BUPROPION HYDROCHLORIDE 75 MG/1
TABLET ORAL
Qty: 180 TABLET | Refills: 0 | Status: SHIPPED | OUTPATIENT
Start: 2018-07-30 | End: 2018-09-10

## 2018-07-31 ENCOUNTER — OFFICE VISIT (OUTPATIENT)
Dept: FAMILY MEDICINE CLINIC | Facility: CLINIC | Age: 58
End: 2018-07-31
Payer: COMMERCIAL

## 2018-07-31 VITALS
SYSTOLIC BLOOD PRESSURE: 134 MMHG | WEIGHT: 189.25 LBS | DIASTOLIC BLOOD PRESSURE: 82 MMHG | HEART RATE: 103 BPM | OXYGEN SATURATION: 97 % | TEMPERATURE: 97 F | BODY MASS INDEX: 31 KG/M2

## 2018-07-31 DIAGNOSIS — J01.00 SUBACUTE MAXILLARY SINUSITIS: Primary | ICD-10-CM

## 2018-07-31 PROCEDURE — 99214 OFFICE O/P EST MOD 30 MIN: CPT | Performed by: INTERNAL MEDICINE

## 2018-07-31 RX ORDER — CIPROFLOXACIN 500 MG/1
500 TABLET, FILM COATED ORAL 2 TIMES DAILY
Qty: 20 TABLET | Refills: 0 | Status: SHIPPED | OUTPATIENT
Start: 2018-07-31 | End: 2018-08-10

## 2018-07-31 RX ORDER — PROMETHAZINE HYDROCHLORIDE AND CODEINE PHOSPHATE 6.25; 1 MG/5ML; MG/5ML
5 SYRUP ORAL EVERY 4 HOURS PRN
Qty: 240 ML | Refills: 0 | Status: SHIPPED | OUTPATIENT
Start: 2018-07-31 | End: 2018-10-16 | Stop reason: ALTCHOICE

## 2018-07-31 NOTE — PROGRESS NOTES
HPI:   Pau Coyne is a 60year old female who presents for upper respiratory symptoms for  7  days. Patient reports congestion, dry cough, cough is keeping pt up at night, chest pain from coughing, sinus pain.       Current Outpatient Prescriptions:  BU Past Surgical History:  No date: APPENDECTOMY  1977: BREAST SURGERY  No date: CHOLECYSTECTOMY  12/14: COLON SURGERY      Comment: removed 10 inches of colon for diverticulitis  11/14: COLONOSCOPY      Comment: diverticulitis  02/2018: HAND/FINGER SURGERY tenderness    ASSESSMENT AND PLAN:   Marcela Mejia is a 60year old female who presents with Sinusitis. PLAN: OTC decongestants, throat lozenges and tylenol and cipro and cough suppression.   The patient indicates understanding of these issues and agrees t

## 2018-08-01 ENCOUNTER — MED REC SCAN ONLY (OUTPATIENT)
Dept: FAMILY MEDICINE CLINIC | Facility: CLINIC | Age: 58
End: 2018-08-01

## 2018-08-06 ENCOUNTER — NURSE ONLY (OUTPATIENT)
Dept: ENDOCRINOLOGY CLINIC | Facility: CLINIC | Age: 58
End: 2018-08-06
Payer: COMMERCIAL

## 2018-08-06 NOTE — PROGRESS NOTES
Pt. didn't have any new information to upload on [employer]. Also her Wu sensor was  so didn't insert. Contacted [facility] to inform them she has 2  sensors prescribed on 18.  Pt.instructed to bring them back to pharmacy & they will

## 2018-08-20 ENCOUNTER — NURSE ONLY (OUTPATIENT)
Dept: ENDOCRINOLOGY CLINIC | Facility: CLINIC | Age: 58
End: 2018-08-20
Payer: COMMERCIAL

## 2018-08-20 DIAGNOSIS — N18.2 UNCONTROLLED TYPE 2 DIABETES MELLITUS WITH STAGE 2 CHRONIC KIDNEY DISEASE, WITHOUT LONG-TERM CURRENT USE OF INSULIN (HCC): ICD-10-CM

## 2018-08-20 DIAGNOSIS — E11.65 UNCONTROLLED TYPE 2 DIABETES MELLITUS WITH STAGE 2 CHRONIC KIDNEY DISEASE, WITHOUT LONG-TERM CURRENT USE OF INSULIN (HCC): ICD-10-CM

## 2018-08-20 DIAGNOSIS — E11.22 UNCONTROLLED TYPE 2 DIABETES MELLITUS WITH STAGE 2 CHRONIC KIDNEY DISEASE, WITHOUT LONG-TERM CURRENT USE OF INSULIN (HCC): ICD-10-CM

## 2018-08-20 PROCEDURE — G0108 DIAB MANAGE TRN  PER INDIV: HCPCS | Performed by: DIETITIAN, REGISTERED

## 2018-08-21 ENCOUNTER — TELEPHONE (OUTPATIENT)
Dept: FAMILY MEDICINE CLINIC | Facility: CLINIC | Age: 58
End: 2018-08-21

## 2018-08-21 VITALS — WEIGHT: 186.38 LBS | BODY MASS INDEX: 31 KG/M2

## 2018-08-21 DIAGNOSIS — I10 ESSENTIAL HYPERTENSION, BENIGN: ICD-10-CM

## 2018-08-21 RX ORDER — METFORMIN HYDROCHLORIDE 750 MG/1
750 TABLET, EXTENDED RELEASE ORAL
Qty: 90 TABLET | Refills: 0 | Status: SHIPPED | OUTPATIENT
Start: 2018-08-21 | End: 2018-12-27

## 2018-08-21 RX ORDER — METFORMIN HYDROCHLORIDE 500 MG/1
1000 TABLET, EXTENDED RELEASE ORAL
Qty: 60 TABLET | Refills: 0 | Status: SHIPPED | OUTPATIENT
Start: 2018-08-21 | End: 2018-08-21 | Stop reason: DRUGHIGH

## 2018-08-21 NOTE — PATIENT INSTRUCTIONS
1. Continue wearing Galilea  2. Continue current meds until discussing options w/PCP  3.  Follow-up on 9/17/18

## 2018-08-21 NOTE — TELEPHONE ENCOUNTER
Pt. seen yesterday for diabetes follow-up & Galilea CGM download. Her avg BG continues to be elevated (186 mg/dL) taking Glipizide ER 10 mg bid, Farxiga & Metformin  mg daily. Her Malorie  upload data is in my chart note for review.  Please advise about wiggins

## 2018-08-21 NOTE — PROGRESS NOTES
Molly Mayorganona  [id number] was seen for Continuous Glucose Sensor Follow-up Visit:    Date: 8/20/2018 Referring MD: Dr. Iza Haas Start time: 12:30pm End time: 1:00pm    Sensor Type: Darwin March    Reviewed CGMS download and patient logs:  Days

## 2018-08-21 NOTE — TELEPHONE ENCOUNTER
OK may be she can go up to the 750 mg dose of XR Metformin, I think her diet has gotten better, but she has a long way to go and exercise needs to be stressed as a daily requirement (30 minutes) walking is fine.

## 2018-08-22 RX ORDER — LOSARTAN POTASSIUM AND HYDROCHLOROTHIAZIDE 12.5; 5 MG/1; MG/1
TABLET ORAL
Qty: 90 TABLET | Refills: 0 | Status: SHIPPED | OUTPATIENT
Start: 2018-08-22 | End: 2018-11-20

## 2018-09-03 ENCOUNTER — NURSE ONLY (OUTPATIENT)
Dept: FAMILY MEDICINE CLINIC | Facility: CLINIC | Age: 58
End: 2018-09-03
Payer: COMMERCIAL

## 2018-09-03 VITALS
SYSTOLIC BLOOD PRESSURE: 120 MMHG | WEIGHT: 186 LBS | OXYGEN SATURATION: 97 % | DIASTOLIC BLOOD PRESSURE: 78 MMHG | BODY MASS INDEX: 31.76 KG/M2 | HEART RATE: 87 BPM | TEMPERATURE: 98 F | HEIGHT: 64 IN

## 2018-09-03 DIAGNOSIS — H92.01 ACUTE OTALGIA, RIGHT: Primary | ICD-10-CM

## 2018-09-03 DIAGNOSIS — H69.81 ACUTE DYSFUNCTION OF RIGHT EUSTACHIAN TUBE: ICD-10-CM

## 2018-09-03 PROCEDURE — 99213 OFFICE O/P EST LOW 20 MIN: CPT | Performed by: NURSE PRACTITIONER

## 2018-09-03 RX ORDER — CEFDINIR 300 MG/1
300 CAPSULE ORAL 2 TIMES DAILY
Qty: 20 CAPSULE | Refills: 0 | Status: SHIPPED | OUTPATIENT
Start: 2018-09-03 | End: 2018-10-16 | Stop reason: ALTCHOICE

## 2018-09-03 NOTE — PROGRESS NOTES
CHIEF COMPLAINT:   Patient presents with:  Ear Pain      HPI:   Celine Marie is a 60year old female who presents to clinic today with complaints of right ear pain. Has had for 1  weeks. Pain is described as moderate.   Patient reports history of ear in Multiple Vitamins-Minerals (CENTRUM SILVER) Oral Tab Take 1 tablet by mouth daily. Disp:  Rfl:    Cyanocobalamin (B-12) 100 MCG Oral Tab Take by mouth. Disp:  Rfl:    Ascorbic Acid (VITAMIN C) 100 MG Oral Tab Take 100 mg by mouth daily.  Disp:  Rfl:    Jeannette Flynn THROAT: oral mucosa pink, moist. Posterior pharynx not erythematous or injected. No exudates. NECK: supple, non-tender  LUNGS: clear to auscultation bilaterally, no wheezes or rhonchi. Breathing is non labored.   CARDIO: RRR without murmur  EXTREMITIES: no The pain or discomfort may come and go. You may hear clicking or popping sounds when you chew or swallow. You may feel that your balance is off. Or you may hear ringing in the ear.   It often takes from several weeks up to 3 months for the fluid to clear on Call your healthcare provider right away if any of the following occur:  · Your ear pain gets worse or does not start to improve   · Fever of 100.4°F (38°C) or higher, or as directed by your healthcare provider  · Fluid or blood draining from the ear  · He

## 2018-09-04 ENCOUNTER — TELEPHONE (OUTPATIENT)
Dept: FAMILY MEDICINE CLINIC | Facility: CLINIC | Age: 58
End: 2018-09-04

## 2018-09-04 NOTE — TELEPHONE ENCOUNTER
Patient said she went to the [facility] yesterday because she was having ear pain and she was put on antibiotics. She is at work now and wanting to leave at 230pm today. She needs the note excusing her from 230-530 today.

## 2018-09-04 NOTE — TELEPHONE ENCOUNTER
CESAR WAS AT URGENT CARE YESTERDAY, SHE FORGOT TO GET A NOTE TO LEAVE EARLY FOR WORK. PLEASE CALL HER BACK.

## 2018-09-11 RX ORDER — BUPROPION HYDROCHLORIDE 75 MG/1
TABLET ORAL
Qty: 180 TABLET | Refills: 0 | Status: SHIPPED | OUTPATIENT
Start: 2018-09-11 | End: 2019-01-28

## 2018-09-13 ENCOUNTER — TELEPHONE (OUTPATIENT)
Dept: FAMILY MEDICINE CLINIC | Facility: CLINIC | Age: 58
End: 2018-09-13

## 2018-09-13 DIAGNOSIS — E78.5 HYPERLIPIDEMIA ASSOCIATED WITH TYPE 2 DIABETES MELLITUS (HCC): ICD-10-CM

## 2018-09-13 DIAGNOSIS — E11.69 HYPERLIPIDEMIA ASSOCIATED WITH TYPE 2 DIABETES MELLITUS (HCC): ICD-10-CM

## 2018-09-13 DIAGNOSIS — N18.2 UNCONTROLLED TYPE 2 DIABETES MELLITUS WITH STAGE 2 CHRONIC KIDNEY DISEASE, WITHOUT LONG-TERM CURRENT USE OF INSULIN (HCC): ICD-10-CM

## 2018-09-13 DIAGNOSIS — E11.22 UNCONTROLLED TYPE 2 DIABETES MELLITUS WITH STAGE 2 CHRONIC KIDNEY DISEASE, WITHOUT LONG-TERM CURRENT USE OF INSULIN (HCC): ICD-10-CM

## 2018-09-13 DIAGNOSIS — E11.65 UNCONTROLLED TYPE 2 DIABETES MELLITUS WITH STAGE 2 CHRONIC KIDNEY DISEASE, WITHOUT LONG-TERM CURRENT USE OF INSULIN (HCC): ICD-10-CM

## 2018-09-13 RX ORDER — FLASH GLUCOSE SENSOR
KIT MISCELLANEOUS
Qty: 1 DEVICE | Refills: 0 | Status: SHIPPED | OUTPATIENT
Start: 2018-09-13 | End: 2020-06-29

## 2018-09-13 NOTE — TELEPHONE ENCOUNTER
Patient said that her [facility] reader is not working, she has a voucher for a free one. She needs an order faxed to [facility] on [facility].

## 2018-09-13 NOTE — TELEPHONE ENCOUNTER
Her reader for her diabetes is not working, she can get a free machine, call [employer]

## 2018-09-14 RX ORDER — DAPAGLIFLOZIN 10 MG/1
10 TABLET, FILM COATED ORAL DAILY
Qty: 30 TABLET | Refills: 3 | Status: SHIPPED | OUTPATIENT
Start: 2018-09-14 | End: 2018-11-27

## 2018-09-24 ENCOUNTER — NURSE ONLY (OUTPATIENT)
Dept: ENDOCRINOLOGY CLINIC | Facility: CLINIC | Age: 58
End: 2018-09-24
Payer: COMMERCIAL

## 2018-09-24 DIAGNOSIS — E11.65 UNCONTROLLED TYPE 2 DIABETES MELLITUS WITH STAGE 2 CHRONIC KIDNEY DISEASE, WITHOUT LONG-TERM CURRENT USE OF INSULIN (HCC): ICD-10-CM

## 2018-09-24 DIAGNOSIS — N18.2 UNCONTROLLED TYPE 2 DIABETES MELLITUS WITH STAGE 2 CHRONIC KIDNEY DISEASE, WITHOUT LONG-TERM CURRENT USE OF INSULIN (HCC): ICD-10-CM

## 2018-09-24 DIAGNOSIS — E11.22 UNCONTROLLED TYPE 2 DIABETES MELLITUS WITH STAGE 2 CHRONIC KIDNEY DISEASE, WITHOUT LONG-TERM CURRENT USE OF INSULIN (HCC): ICD-10-CM

## 2018-09-24 PROCEDURE — G0108 DIAB MANAGE TRN  PER INDIV: HCPCS | Performed by: DIETITIAN, REGISTERED

## 2018-09-26 VITALS — BODY MASS INDEX: 32 KG/M2 | DIASTOLIC BLOOD PRESSURE: 78 MMHG | SYSTOLIC BLOOD PRESSURE: 118 MMHG | WEIGHT: 186.63 LBS

## 2018-09-26 NOTE — PROGRESS NOTES
Aurelia Kosta  [id number] was seen for Continuous Glucose Sensor Follow-up Visit:    Date: 9/24/2018 Referring MD: Dr. Daniel Pruitt Start time: 12:30pm End time: 1:00pm    Sensor Type: Malorie Clarke Personal Flash CGM    Site Assessment: no problem    Reviewed CGMS

## 2018-10-01 ENCOUNTER — MED REC SCAN ONLY (OUTPATIENT)
Dept: FAMILY MEDICINE CLINIC | Facility: CLINIC | Age: 58
End: 2018-10-01

## 2018-10-01 ENCOUNTER — IMMUNIZATION (OUTPATIENT)
Dept: FAMILY MEDICINE CLINIC | Facility: CLINIC | Age: 58
End: 2018-10-01
Payer: COMMERCIAL

## 2018-10-01 DIAGNOSIS — Z23 NEED FOR VACCINATION: ICD-10-CM

## 2018-10-01 PROCEDURE — 90471 IMMUNIZATION ADMIN: CPT | Performed by: INTERNAL MEDICINE

## 2018-10-01 PROCEDURE — 90686 IIV4 VACC NO PRSV 0.5 ML IM: CPT | Performed by: INTERNAL MEDICINE

## 2018-10-02 ENCOUNTER — TELEPHONE (OUTPATIENT)
Dept: FAMILY MEDICINE CLINIC | Facility: CLINIC | Age: 58
End: 2018-10-02

## 2018-10-02 DIAGNOSIS — E11.65 UNCONTROLLED TYPE 2 DIABETES MELLITUS WITH HYPERGLYCEMIA (HCC): Primary | ICD-10-CM

## 2018-10-02 NOTE — TELEPHONE ENCOUNTER
So we can order GLP-1 ----but is she NOT going to start it? Or shall we have her up to 850 daily metformin instead of alternating?       [Dr kang is out of country currently]

## 2018-10-02 NOTE — TELEPHONE ENCOUNTER
Faxed copies of [employer] from pt.'s previous visit for provider taking care of pt. while PCP is on  Vacation. Her readings remain elevated after dinner.  However, after speaking to pt. yesterday, she states she is alternating doses of metformin

## 2018-10-03 ENCOUNTER — MED REC SCAN ONLY (OUTPATIENT)
Dept: FAMILY MEDICINE CLINIC | Facility: CLINIC | Age: 58
End: 2018-10-03

## 2018-10-04 DIAGNOSIS — E78.5 HYPERLIPIDEMIA ASSOCIATED WITH TYPE 2 DIABETES MELLITUS (HCC): ICD-10-CM

## 2018-10-04 DIAGNOSIS — E11.69 HYPERLIPIDEMIA ASSOCIATED WITH TYPE 2 DIABETES MELLITUS (HCC): ICD-10-CM

## 2018-10-05 RX ORDER — ATORVASTATIN CALCIUM 80 MG/1
TABLET, FILM COATED ORAL
Qty: 90 TABLET | Refills: 0 | Status: SHIPPED | OUTPATIENT
Start: 2018-10-05 | End: 2018-12-26

## 2018-10-05 RX ORDER — OMEPRAZOLE 40 MG/1
CAPSULE, DELAYED RELEASE ORAL
Qty: 90 CAPSULE | Refills: 0 | Status: SHIPPED | OUTPATIENT
Start: 2018-10-05 | End: 2018-12-26

## 2018-10-05 NOTE — TELEPHONE ENCOUNTER
Last OV 7/31/18  Last lipids 6/18/18 for specialist, atorvastatin was increased then.   Omeprazole last ordered 7/16/18 #90  Atorvastatin last ordered 7/12/18 #90

## 2018-10-11 ENCOUNTER — TELEPHONE (OUTPATIENT)
Dept: ENDOCRINOLOGY CLINIC | Facility: CLINIC | Age: 58
End: 2018-10-11

## 2018-10-11 NOTE — TELEPHONE ENCOUNTER
DINOM for pt. to return call to schedule a follow-up appt. after she has had her completed her labwork. We could  discuss changes to her diabetes medications if A1C isn't at goal range. Hrs & nos provided.

## 2018-10-11 NOTE — TELEPHONE ENCOUNTER
Rajiv Berg called back and recommended that patient recheck her Hgba1c and follow up with her. She will contact patient regarding this. Orders placed in Epic for labs.

## 2018-10-15 ENCOUNTER — LABORATORY ENCOUNTER (OUTPATIENT)
Dept: LAB | Age: 58
End: 2018-10-15
Attending: INTERNAL MEDICINE
Payer: COMMERCIAL

## 2018-10-15 DIAGNOSIS — I10 ESSENTIAL HYPERTENSION, BENIGN: Primary | ICD-10-CM

## 2018-10-15 DIAGNOSIS — E11.65 UNCONTROLLED TYPE 2 DIABETES MELLITUS WITH HYPERGLYCEMIA (HCC): ICD-10-CM

## 2018-10-15 DIAGNOSIS — E11.69 HYPERLIPIDEMIA ASSOCIATED WITH TYPE 2 DIABETES MELLITUS (HCC): ICD-10-CM

## 2018-10-15 DIAGNOSIS — E78.5 HYPERLIPIDEMIA ASSOCIATED WITH TYPE 2 DIABETES MELLITUS (HCC): ICD-10-CM

## 2018-10-15 PROCEDURE — 83036 HEMOGLOBIN GLYCOSYLATED A1C: CPT

## 2018-10-15 PROCEDURE — 80053 COMPREHEN METABOLIC PANEL: CPT

## 2018-10-15 PROCEDURE — 80061 LIPID PANEL: CPT

## 2018-10-15 PROCEDURE — 36415 COLL VENOUS BLD VENIPUNCTURE: CPT

## 2018-10-16 ENCOUNTER — OFFICE VISIT (OUTPATIENT)
Dept: FAMILY MEDICINE CLINIC | Facility: CLINIC | Age: 58
End: 2018-10-16
Payer: COMMERCIAL

## 2018-10-16 ENCOUNTER — TELEPHONE (OUTPATIENT)
Dept: ENDOCRINOLOGY CLINIC | Facility: CLINIC | Age: 58
End: 2018-10-16

## 2018-10-16 VITALS
TEMPERATURE: 98 F | HEART RATE: 87 BPM | OXYGEN SATURATION: 98 % | SYSTOLIC BLOOD PRESSURE: 126 MMHG | DIASTOLIC BLOOD PRESSURE: 80 MMHG | RESPIRATION RATE: 18 BRPM

## 2018-10-16 DIAGNOSIS — E11.9 DIABETES MELLITUS TYPE 2, NONINSULIN DEPENDENT (HCC): ICD-10-CM

## 2018-10-16 DIAGNOSIS — R30.0 DYSURIA: ICD-10-CM

## 2018-10-16 DIAGNOSIS — R81 GLUCOSURIA: Primary | ICD-10-CM

## 2018-10-16 DIAGNOSIS — R35.0 URINARY FREQUENCY: ICD-10-CM

## 2018-10-16 PROCEDURE — 81003 URINALYSIS AUTO W/O SCOPE: CPT | Performed by: PHYSICIAN ASSISTANT

## 2018-10-16 PROCEDURE — 82962 GLUCOSE BLOOD TEST: CPT | Performed by: PHYSICIAN ASSISTANT

## 2018-10-16 PROCEDURE — 99214 OFFICE O/P EST MOD 30 MIN: CPT | Performed by: PHYSICIAN ASSISTANT

## 2018-10-16 PROCEDURE — 87086 URINE CULTURE/COLONY COUNT: CPT | Performed by: PHYSICIAN ASSISTANT

## 2018-10-16 RX ORDER — NITROFURANTOIN 25; 75 MG/1; MG/1
100 CAPSULE ORAL 2 TIMES DAILY
Qty: 14 CAPSULE | Refills: 0 | Status: SHIPPED | OUTPATIENT
Start: 2018-10-16 | End: 2018-10-23

## 2018-10-16 RX ORDER — FLUCONAZOLE 150 MG/1
150 TABLET ORAL ONCE
Qty: 1 TABLET | Refills: 0 | Status: SHIPPED | OUTPATIENT
Start: 2018-10-16 | End: 2018-10-16

## 2018-10-16 NOTE — PROGRESS NOTES
CHIEF COMPLAINT:   Patient presents with:  UTI: burning and frequency on urination x 4 days     Accu-check 144. HPI:   Margarita Rick is a 60year old female who presents with symptoms of UTI. Complaining of urinary frequency, urgency, dysuria.    4 d GlipiZIDE ER 10 MG Oral Tablet 24 Hr Take 1 tablet (10 mg total) by mouth 2 (two) times daily before meals.  Disp: 60 tablet Rfl: 3   Continuous Blood Gluc Sensor ([facility]) Does not apply Misc 3 each by Does not apply route every 30 (t Collection Time: 10/16/18  5:49 PM   Result Value Ref Range    GLUCOSE (URINE DIPSTICK) 500 mg/dL    BILIRUBIN Neg Negative    KETONES (URINE DIPSTICK) Neg Negative mg/dL    SPECIFIC GRAVITY 1.010 1.005 - 1.030    OCCULT BLOOD Neg Negative    PH, URINE 5. Risk and benefits of medication discussed. Stressed importance of completing full course of antibiotic unless told otherwise. The patient indicates understanding of these issues and agrees to the plan.   The patient is asked to see PCP in 3 days if no These home care tips may help:  · Don't use any chemicals or products that you think may be causing your symptoms. · If you were given a prescription medicine, take as directed. Be sure to take it until it is all used up.   · If a culture was taken, don't © 5403-0388 [employer] [address]. [address] [facility] [place] [address] [place]. All rights reserved. This information is not intended as a substitute for professional medical care. Always follow your healthcare professional's instructions.               Sylwia Courtney

## 2018-10-16 NOTE — PATIENT INSTRUCTIONS
1. Urine dip negative with exception of glucose, urine culture pending. 2.  Discussed possibility of symptoms related to diabetes (glucose in urine) vs. Atrophic vaginitis (postmenopausal loss of elasticity/vaginal dryness).    3.  Encourage fluids, watc provider's advice to treat your condition. If a culture was done and it is positive:  · Both you and your sexual partner may need to be treated. This is true even if your partner has no symptoms.   · Contact your healthcare provider or go to an urgent care

## 2018-10-19 NOTE — TELEPHONE ENCOUNTER
Pt. contacted regarding Dr. Bonnie Melchor & Trav recommendations for medication, wt. Loss & exercise. Pt. states that she is watching her diet very closely & possibly once per week will cheats & may have a burger w/half of the bun & french fries.  She tries

## 2018-11-20 DIAGNOSIS — I10 ESSENTIAL HYPERTENSION, BENIGN: ICD-10-CM

## 2018-11-21 RX ORDER — LOSARTAN POTASSIUM AND HYDROCHLOROTHIAZIDE 12.5; 5 MG/1; MG/1
TABLET ORAL
Qty: 90 TABLET | Refills: 0 | Status: SHIPPED | OUTPATIENT
Start: 2018-11-21 | End: 2019-02-25

## 2018-11-27 ENCOUNTER — MED REC SCAN ONLY (OUTPATIENT)
Dept: ENDOCRINOLOGY CLINIC | Facility: CLINIC | Age: 58
End: 2018-11-27

## 2018-11-27 DIAGNOSIS — E11.69 HYPERLIPIDEMIA ASSOCIATED WITH TYPE 2 DIABETES MELLITUS (HCC): ICD-10-CM

## 2018-11-27 DIAGNOSIS — E11.22 UNCONTROLLED TYPE 2 DIABETES MELLITUS WITH STAGE 2 CHRONIC KIDNEY DISEASE, WITHOUT LONG-TERM CURRENT USE OF INSULIN (HCC): ICD-10-CM

## 2018-11-27 DIAGNOSIS — E11.65 UNCONTROLLED TYPE 2 DIABETES MELLITUS WITH STAGE 2 CHRONIC KIDNEY DISEASE, WITHOUT LONG-TERM CURRENT USE OF INSULIN (HCC): ICD-10-CM

## 2018-11-27 DIAGNOSIS — N18.2 UNCONTROLLED TYPE 2 DIABETES MELLITUS WITH STAGE 2 CHRONIC KIDNEY DISEASE, WITHOUT LONG-TERM CURRENT USE OF INSULIN (HCC): ICD-10-CM

## 2018-11-27 DIAGNOSIS — E78.5 HYPERLIPIDEMIA ASSOCIATED WITH TYPE 2 DIABETES MELLITUS (HCC): ICD-10-CM

## 2018-11-27 RX ORDER — GLIPIZIDE 10 MG/1
10 TABLET, FILM COATED, EXTENDED RELEASE ORAL
Qty: 180 TABLET | Refills: 3 | Status: SHIPPED | OUTPATIENT
Start: 2018-11-27 | End: 2019-02-25

## 2018-11-27 RX ORDER — DAPAGLIFLOZIN 10 MG/1
10 TABLET, FILM COATED ORAL DAILY
Qty: 90 TABLET | Refills: 3 | Status: SHIPPED | OUTPATIENT
Start: 2018-11-27 | End: 2019-02-25

## 2018-11-27 NOTE — TELEPHONE ENCOUNTER
Sensor Type: Galilea Personal Flash CGM     Reviewed CGMS download and patient logs:  Days of sensor use:  10/30/18-11/26/18  Average glucose (mg/dL) :  172 mg/dL  Estimated A1C: 7.6%  Target Ranges:  mg/dL        46% over target               1% under

## 2018-11-27 NOTE — PROGRESS NOTES
Pt. [employer] for review. Forwarded to PCP. States she has been under a lot of stress which she feels raises her BG levels.

## 2018-12-14 ENCOUNTER — OFFICE VISIT (OUTPATIENT)
Dept: FAMILY MEDICINE CLINIC | Facility: CLINIC | Age: 58
End: 2018-12-14
Payer: COMMERCIAL

## 2018-12-14 VITALS
DIASTOLIC BLOOD PRESSURE: 78 MMHG | HEART RATE: 80 BPM | OXYGEN SATURATION: 98 % | SYSTOLIC BLOOD PRESSURE: 120 MMHG | RESPIRATION RATE: 18 BRPM | TEMPERATURE: 98 F

## 2018-12-14 DIAGNOSIS — J01.40 ACUTE NON-RECURRENT PANSINUSITIS: Primary | ICD-10-CM

## 2018-12-14 PROCEDURE — 99213 OFFICE O/P EST LOW 20 MIN: CPT | Performed by: PHYSICIAN ASSISTANT

## 2018-12-14 RX ORDER — DOXYCYCLINE HYCLATE 100 MG
100 TABLET ORAL 2 TIMES DAILY
Qty: 20 TABLET | Refills: 0 | Status: SHIPPED | OUTPATIENT
Start: 2018-12-14 | End: 2018-12-24

## 2018-12-14 NOTE — PATIENT INSTRUCTIONS
Fluids   Continue nasal steroid spray   Please follow up with PCP if no improvement or if symptoms worsen

## 2018-12-14 NOTE — PROGRESS NOTES
CHIEF COMPLAINT:   Patient presents with:  Sinus Problem: sinus pain and pressure x 1 day       HPI:   Sadie Rae is a 60year old female who presents for cold symptoms for one week but worsened yesterday. +sinus pain and pressure.  She was around cats Units by mouth daily. Disp:  Rfl:    Biotin 1 MG Oral Cap Take by mouth.  Disp:  Rfl:       Past Medical History:   Diagnosis Date   • Allergic rhinitis due to pollen 5/7/2012   • Diverticulosis of colon (without mention of hemorrhage) 12/15/2008   • Essent numbness or tingling in face.     EXAM:   /78 (BP Location: Left arm, Patient Position: Sitting, Cuff Size: adult)   Pulse 80   Temp 97.6 °F (36.4 °C) (Tympanic)   Resp 18   SpO2 98%   GENERAL: well developed, well nourished,in no apparent distress  S

## 2018-12-26 DIAGNOSIS — E11.69 HYPERLIPIDEMIA ASSOCIATED WITH TYPE 2 DIABETES MELLITUS (HCC): ICD-10-CM

## 2018-12-26 DIAGNOSIS — E78.5 HYPERLIPIDEMIA ASSOCIATED WITH TYPE 2 DIABETES MELLITUS (HCC): ICD-10-CM

## 2018-12-26 RX ORDER — ATORVASTATIN CALCIUM 80 MG/1
TABLET, FILM COATED ORAL
Qty: 90 TABLET | Refills: 0 | Status: SHIPPED | OUTPATIENT
Start: 2018-12-26 | End: 2019-04-01

## 2018-12-26 RX ORDER — OMEPRAZOLE 40 MG/1
CAPSULE, DELAYED RELEASE ORAL
Qty: 90 CAPSULE | Refills: 0 | Status: SHIPPED | OUTPATIENT
Start: 2018-12-26 | End: 2019-04-01

## 2018-12-26 NOTE — TELEPHONE ENCOUNTER
Last office visit: 7/31/2018 (acute)  Last refill:    Omeprazole: 10/5/2018, #90   Atorvastatin: 10/5/2018, #90  Labs due: 1/15/2019    No future appointments.     Contacted pt notified of upcoming labs, states she will call after the first of the year to s

## 2018-12-27 ENCOUNTER — TELEPHONE (OUTPATIENT)
Dept: FAMILY MEDICINE CLINIC | Facility: CLINIC | Age: 58
End: 2018-12-27

## 2018-12-27 ENCOUNTER — TELEPHONE (OUTPATIENT)
Dept: ENDOCRINOLOGY CLINIC | Facility: CLINIC | Age: 58
End: 2018-12-27

## 2018-12-27 RX ORDER — METFORMIN HYDROCHLORIDE 750 MG/1
750 TABLET, EXTENDED RELEASE ORAL
Qty: 90 TABLET | Refills: 0 | Status: SHIPPED | OUTPATIENT
Start: 2018-12-27 | End: 2019-02-25

## 2018-12-27 NOTE — TELEPHONE ENCOUNTER
She cannot get a hold of Tru Ortega to fill metformin er, will aida fill? She knows she needs a blood test but cannot get in right now, [employer] on [address].

## 2018-12-27 NOTE — TELEPHONE ENCOUNTER
Metformin refill request.     Last office visit: 7/31/2018  Last refill: 8/21/2018, #90  Labs completed: 10/15/2018    Contacted pt, left message for pt to return phone call.  Most recent note from [facility] pt is taking Metformin ER 750mg daily,

## 2019-01-28 RX ORDER — BUPROPION HYDROCHLORIDE 75 MG/1
TABLET ORAL
Qty: 180 TABLET | Refills: 0 | Status: SHIPPED | OUTPATIENT
Start: 2019-01-28 | End: 2019-04-24

## 2019-01-29 ENCOUNTER — TELEPHONE (OUTPATIENT)
Dept: FAMILY MEDICINE CLINIC | Facility: CLINIC | Age: 59
End: 2019-01-29

## 2019-01-31 ENCOUNTER — TELEPHONE (OUTPATIENT)
Dept: FAMILY MEDICINE CLINIC | Facility: CLINIC | Age: 59
End: 2019-01-31

## 2019-01-31 NOTE — TELEPHONE ENCOUNTER
Fausto Sample was supposed to get extra forms re: her fmla. She did not receive, pls re-fax to [phone].

## 2019-01-31 NOTE — TELEPHONE ENCOUNTER
Patient said that she was told Tuesday that she needs to fill out forms from our office before Dr Ligia Alicia can fill out her paperwork. . Form was faxed Tuesday but did not go through, form re-faxed.

## 2019-02-02 NOTE — TELEPHONE ENCOUNTER
[facility] paperwork faxed to patient's employer attn Leann Shirley [phone]. Copy up front for patient to .

## 2019-02-06 ENCOUNTER — TELEPHONE (OUTPATIENT)
Dept: FAMILY MEDICINE CLINIC | Facility: CLINIC | Age: 59
End: 2019-02-06

## 2019-02-13 ENCOUNTER — TELEPHONE (OUTPATIENT)
Dept: ENDOCRINOLOGY CLINIC | Facility: CLINIC | Age: 59
End: 2019-02-13

## 2019-02-13 DIAGNOSIS — N18.2: Primary | ICD-10-CM

## 2019-02-13 DIAGNOSIS — E11.65: Primary | ICD-10-CM

## 2019-02-13 DIAGNOSIS — E11.22: Primary | ICD-10-CM

## 2019-02-13 RX ORDER — FLASH GLUCOSE SCANNING READER
1 EACH MISCELLANEOUS CONTINUOUS
Qty: 1 DEVICE | Refills: 0 | Status: SHIPPED | OUTPATIENT
Start: 2019-02-13

## 2019-02-13 RX ORDER — FLASH GLUCOSE SENSOR
1 KIT MISCELLANEOUS
Qty: 2 EACH | Refills: 11 | Status: SHIPPED | OUTPATIENT
Start: 2019-02-13 | End: 2020-06-29

## 2019-02-17 ENCOUNTER — OFFICE VISIT (OUTPATIENT)
Dept: FAMILY MEDICINE CLINIC | Facility: CLINIC | Age: 59
End: 2019-02-17
Payer: COMMERCIAL

## 2019-02-17 VITALS
SYSTOLIC BLOOD PRESSURE: 132 MMHG | BODY MASS INDEX: 33 KG/M2 | RESPIRATION RATE: 18 BRPM | HEART RATE: 105 BPM | WEIGHT: 191 LBS | TEMPERATURE: 99 F | DIASTOLIC BLOOD PRESSURE: 84 MMHG | OXYGEN SATURATION: 96 %

## 2019-02-17 DIAGNOSIS — J06.9 VIRAL UPPER RESPIRATORY TRACT INFECTION: Primary | ICD-10-CM

## 2019-02-17 PROCEDURE — 99213 OFFICE O/P EST LOW 20 MIN: CPT | Performed by: NURSE PRACTITIONER

## 2019-02-17 NOTE — PROGRESS NOTES
CHIEF COMPLAINT:   Patient presents with:  Sinus Problem: congestion/sinus pressure x 1 week  Ear Pain: left ear pain x 2 days. no fever      HPI:   Michael Cuello is a 60year old female who presents for upper respiratory symptoms for  3 days.  Patient rep Multiple Vitamins-Minerals (CENTRUM SILVER) Oral Tab Take 1 tablet by mouth daily. Disp:  Rfl:    Cyanocobalamin (B-12) 100 MCG Oral Tab Take by mouth. Disp:  Rfl:    Ascorbic Acid (VITAMIN C) 100 MG Oral Tab Take 100 mg by mouth daily.  Disp:  Rfl:    Rayna Torres EXAM:   /84 (BP Location: Left arm, Patient Position: Sitting, Cuff Size: adult)   Pulse 105   Temp 98.5 °F (36.9 °C) (Oral)   Resp 18   Wt 191 lb   SpO2 96%   BMI 32.79 kg/m²   GENERAL: well developed, well nourished,in no apparent distress  SKI · Drink plenty of fluids (water or clear soup)  · Stop smoking and drinking alcohol  · Get plenty of rest  Understand a fever  · Take your temperature several times a day.  If your fever is 100.4°F (38.0°C) for more than a day, call your healthcare provider · Pain or pressure in the chest or belly (abdomen)  · Worsening symptoms, especially after a period of improvement  · Fever of 100.4°F  (38.0°C) or higher, or fever that doesn't go down with medicine  · Sudden dizziness or confusion  · Severe or continued

## 2019-02-17 NOTE — PATIENT INSTRUCTIONS
Use Coricidan HPB for cold and cough       Adult Self-Care for Colds    Colds are caused by viruses. They can't be cured with antibiotics. However, you can ease symptoms and support your body's efforts to heal itself.  No matter which symptoms you have, be provider if antiviral medicines are appropriate. Antibiotics should not be taken for colds or flu.  Also, call your healthcare provider if you have any of the following symptoms or if you aren't feeling better after 7 days:  · Shortness of breath  · Pain or

## 2019-02-18 ENCOUNTER — OFFICE VISIT (OUTPATIENT)
Dept: FAMILY MEDICINE CLINIC | Facility: CLINIC | Age: 59
End: 2019-02-18
Payer: COMMERCIAL

## 2019-02-18 VITALS
SYSTOLIC BLOOD PRESSURE: 130 MMHG | RESPIRATION RATE: 20 BRPM | DIASTOLIC BLOOD PRESSURE: 80 MMHG | WEIGHT: 189 LBS | HEIGHT: 65 IN | TEMPERATURE: 97 F | BODY MASS INDEX: 31.49 KG/M2 | HEART RATE: 100 BPM

## 2019-02-18 DIAGNOSIS — J01.01 ACUTE RECURRENT MAXILLARY SINUSITIS: Primary | ICD-10-CM

## 2019-02-18 PROCEDURE — 99214 OFFICE O/P EST MOD 30 MIN: CPT | Performed by: INTERNAL MEDICINE

## 2019-02-18 RX ORDER — CIPROFLOXACIN 500 MG/1
500 TABLET, FILM COATED ORAL 2 TIMES DAILY
Qty: 20 TABLET | Refills: 0 | Status: SHIPPED | OUTPATIENT
Start: 2019-02-18 | End: 2019-02-28

## 2019-02-18 NOTE — PROGRESS NOTES
HPI:   Michael Cuello is a 60year old female who presents for upper respiratory symptoms for  7  days. Patient reports sore throat, congestion, sinus pain. She is returning next week for DM recheck.        Current Outpatient Medications:  Continuous Blood Disp:  Rfl:    Biotin 1 MG Oral Cap Take by mouth.  Disp:  Rfl:       Past Medical History:   Diagnosis Date   • Allergic rhinitis due to pollen 5/7/2012   • Diverticulosis of colon (without mention of hemorrhage) 12/15/2008   • Essential hypertension, viviane Right arm, Patient Position: Sitting, Cuff Size: large)   Pulse 100   Temp 97.4 °F (36.3 °C) (Temporal)   Resp 20   Ht 65\"   Wt 189 lb   BMI 31.45 kg/m²   GENERAL: well developed, well nourished,in no apparent distress  SKIN: no rashes,no suspicious lesio

## 2019-02-25 ENCOUNTER — LABORATORY ENCOUNTER (OUTPATIENT)
Dept: LAB | Age: 59
End: 2019-02-25
Attending: INTERNAL MEDICINE
Payer: COMMERCIAL

## 2019-02-25 ENCOUNTER — OFFICE VISIT (OUTPATIENT)
Dept: FAMILY MEDICINE CLINIC | Facility: CLINIC | Age: 59
End: 2019-02-25
Payer: COMMERCIAL

## 2019-02-25 VITALS
WEIGHT: 188 LBS | OXYGEN SATURATION: 97 % | BODY MASS INDEX: 31.32 KG/M2 | HEIGHT: 65 IN | TEMPERATURE: 100 F | SYSTOLIC BLOOD PRESSURE: 120 MMHG | HEART RATE: 95 BPM | DIASTOLIC BLOOD PRESSURE: 78 MMHG

## 2019-02-25 DIAGNOSIS — E11.69 HYPERLIPIDEMIA ASSOCIATED WITH TYPE 2 DIABETES MELLITUS (HCC): ICD-10-CM

## 2019-02-25 DIAGNOSIS — E11.22 UNCONTROLLED TYPE 2 DIABETES MELLITUS WITH STAGE 2 CHRONIC KIDNEY DISEASE, WITHOUT LONG-TERM CURRENT USE OF INSULIN (HCC): ICD-10-CM

## 2019-02-25 DIAGNOSIS — I10 ESSENTIAL HYPERTENSION, BENIGN: ICD-10-CM

## 2019-02-25 DIAGNOSIS — E78.5 HYPERLIPIDEMIA ASSOCIATED WITH TYPE 2 DIABETES MELLITUS (HCC): ICD-10-CM

## 2019-02-25 DIAGNOSIS — N18.2 UNCONTROLLED TYPE 2 DIABETES MELLITUS WITH STAGE 2 CHRONIC KIDNEY DISEASE, WITHOUT LONG-TERM CURRENT USE OF INSULIN (HCC): ICD-10-CM

## 2019-02-25 DIAGNOSIS — E11.65 UNCONTROLLED TYPE 2 DIABETES MELLITUS WITH STAGE 2 CHRONIC KIDNEY DISEASE, WITHOUT LONG-TERM CURRENT USE OF INSULIN (HCC): ICD-10-CM

## 2019-02-25 LAB
ALBUMIN SERPL-MCNC: 3.8 G/DL (ref 3.4–5)
ALBUMIN/GLOB SERPL: 1 {RATIO} (ref 1–2)
ALP LIVER SERPL-CCNC: 117 U/L (ref 46–118)
ALT SERPL-CCNC: 71 U/L (ref 13–56)
ANION GAP SERPL CALC-SCNC: 11 MMOL/L (ref 0–18)
AST SERPL-CCNC: 25 U/L (ref 15–37)
BASOPHILS # BLD AUTO: 0.07 X10(3) UL (ref 0–0.2)
BASOPHILS NFR BLD AUTO: 0.8 %
BILIRUB SERPL-MCNC: 0.4 MG/DL (ref 0.1–2)
BUN BLD-MCNC: 25 MG/DL (ref 7–18)
BUN/CREAT SERPL: 32.5 (ref 10–20)
CALCIUM BLD-MCNC: 9.8 MG/DL (ref 8.5–10.1)
CHLORIDE SERPL-SCNC: 104 MMOL/L (ref 98–107)
CHOLEST SMN-MCNC: 171 MG/DL (ref ?–200)
CO2 SERPL-SCNC: 26 MMOL/L (ref 21–32)
CREAT BLD-MCNC: 0.77 MG/DL (ref 0.55–1.02)
DEPRECATED RDW RBC AUTO: 40.7 FL (ref 35.1–46.3)
EOSINOPHIL # BLD AUTO: 0.16 X10(3) UL (ref 0–0.7)
EOSINOPHIL NFR BLD AUTO: 1.8 %
ERYTHROCYTE [DISTWIDTH] IN BLOOD BY AUTOMATED COUNT: 13.5 % (ref 11–15)
EST. AVERAGE GLUCOSE BLD GHB EST-MCNC: 194 MG/DL (ref 68–126)
GLOBULIN PLAS-MCNC: 3.8 G/DL (ref 2.8–4.4)
GLUCOSE BLD-MCNC: 143 MG/DL (ref 70–99)
HBA1C MFR BLD HPLC: 8.4 % (ref ?–5.7)
HCT VFR BLD AUTO: 44.8 % (ref 35–48)
HDLC SERPL-MCNC: 45 MG/DL (ref 40–59)
HGB BLD-MCNC: 15 G/DL (ref 12–16)
IMM GRANULOCYTES # BLD AUTO: 0.05 X10(3) UL (ref 0–1)
IMM GRANULOCYTES NFR BLD: 0.6 %
LDLC SERPL CALC-MCNC: 87 MG/DL (ref ?–100)
LYMPHOCYTES # BLD AUTO: 3.29 X10(3) UL (ref 1–4)
LYMPHOCYTES NFR BLD AUTO: 38 %
M PROTEIN MFR SERPL ELPH: 7.6 G/DL (ref 6.4–8.2)
MCH RBC QN AUTO: 28 PG (ref 26–34)
MCHC RBC AUTO-ENTMCNC: 33.5 G/DL (ref 31–37)
MCV RBC AUTO: 83.6 FL (ref 80–100)
MONOCYTES # BLD AUTO: 0.63 X10(3) UL (ref 0.1–1)
MONOCYTES NFR BLD AUTO: 7.3 %
NEUTROPHILS # BLD AUTO: 4.46 X10 (3) UL (ref 1.5–7.7)
NEUTROPHILS # BLD AUTO: 4.46 X10(3) UL (ref 1.5–7.7)
NEUTROPHILS NFR BLD AUTO: 51.5 %
NONHDLC SERPL-MCNC: 126 MG/DL (ref ?–130)
OSMOLALITY SERPL CALC.SUM OF ELEC: 299 MOSM/KG (ref 275–295)
PLATELET # BLD AUTO: 298 10(3)UL (ref 150–450)
POTASSIUM SERPL-SCNC: 3.6 MMOL/L (ref 3.5–5.1)
RBC # BLD AUTO: 5.36 X10(6)UL (ref 3.8–5.3)
SODIUM SERPL-SCNC: 141 MMOL/L (ref 136–145)
TRIGL SERPL-MCNC: 195 MG/DL (ref 30–149)
VLDLC SERPL CALC-MCNC: 39 MG/DL (ref 0–30)
WBC # BLD AUTO: 8.7 X10(3) UL (ref 4–11)

## 2019-02-25 PROCEDURE — 85025 COMPLETE CBC W/AUTO DIFF WBC: CPT

## 2019-02-25 PROCEDURE — 36415 COLL VENOUS BLD VENIPUNCTURE: CPT

## 2019-02-25 PROCEDURE — 83036 HEMOGLOBIN GLYCOSYLATED A1C: CPT

## 2019-02-25 PROCEDURE — 80053 COMPREHEN METABOLIC PANEL: CPT

## 2019-02-25 PROCEDURE — 80061 LIPID PANEL: CPT

## 2019-02-25 PROCEDURE — 99214 OFFICE O/P EST MOD 30 MIN: CPT | Performed by: INTERNAL MEDICINE

## 2019-02-25 RX ORDER — GLIPIZIDE 10 MG/1
10 TABLET, FILM COATED, EXTENDED RELEASE ORAL
Qty: 180 TABLET | Refills: 3 | Status: SHIPPED | OUTPATIENT
Start: 2019-02-25 | End: 2020-02-14

## 2019-02-25 RX ORDER — METFORMIN HYDROCHLORIDE 750 MG/1
750 TABLET, EXTENDED RELEASE ORAL
Qty: 90 TABLET | Refills: 0 | Status: SHIPPED | OUTPATIENT
Start: 2019-02-25 | End: 2019-05-24

## 2019-02-25 RX ORDER — DAPAGLIFLOZIN 10 MG/1
10 TABLET, FILM COATED ORAL DAILY
Qty: 90 TABLET | Refills: 3 | Status: SHIPPED | OUTPATIENT
Start: 2019-02-25 | End: 2020-02-14

## 2019-02-25 RX ORDER — LOSARTAN POTASSIUM AND HYDROCHLOROTHIAZIDE 12.5; 5 MG/1; MG/1
TABLET ORAL
Qty: 90 TABLET | Refills: 0 | Status: SHIPPED | OUTPATIENT
Start: 2019-02-25 | End: 2019-05-24

## 2019-02-25 NOTE — PROGRESS NOTES
HPI:   Sona Beasley is a 60year old female who presents for a complete physical exam. Symptoms: denies discharge, itching, burning or dysuria, is menopausal, is S/P ИРИНА, ovaries preserved. Patient complains of sick last week and missed work.        Sveta Kang Medications:  Ciprofloxacin HCl (CIPRO) 500 MG Oral Tab Take 1 tablet (500 mg total) by mouth 2 (two) times daily for 10 days.  Disp: 20 tablet Rfl: 0   Continuous Blood Gluc  ([employer] 14 DAY READER) Does not apply Device 1 Device by Does no History:   Diagnosis Date   • Allergic rhinitis due to pollen 5/7/2012   • Diverticulosis of colon (without mention of hemorrhage) 12/15/2008   • Essential hypertension, benign 11/5/2007   • Obesity, unspecified 11/5/2007   • Proteinuria 3/11/2011   • Type denies abdominal pain,denies heartburn  : denies dysuria, vaginal discharge or itching,periods regular   MUSCULOSKELETAL: denies back pain  NEURO: denies headaches  PSYCHE: denies depression or anxiety  HEMATOLOGIC: denies hx of anemia  ENDOCRINE: denies

## 2019-02-28 DIAGNOSIS — E11.65 UNCONTROLLED TYPE 2 DIABETES MELLITUS WITH HYPERGLYCEMIA (HCC): Primary | ICD-10-CM

## 2019-03-04 ENCOUNTER — TELEPHONE (OUTPATIENT)
Dept: FAMILY MEDICINE CLINIC | Facility: CLINIC | Age: 59
End: 2019-03-04

## 2019-03-04 NOTE — TELEPHONE ENCOUNTER
CESAR SAID THE PHARMACY WON'T FILL THE METFORMIN DUE TO IT BEING FILLED 12/31/2018.   PLEASE CALL CESAR

## 2019-03-04 NOTE — TELEPHONE ENCOUNTER
Patient states that the pharmacy told her that she cannot gey her Metformin 750mg filled because she picked it up on 12/21/18.  Patient insists she did not, the pharmacists said that they can give her a temporary supply of 5 tabs she can pay cash it is$11.?

## 2019-03-12 ENCOUNTER — TELEPHONE (OUTPATIENT)
Dept: FAMILY MEDICINE CLINIC | Facility: CLINIC | Age: 59
End: 2019-03-12

## 2019-03-12 RX ORDER — ONDANSETRON 4 MG/1
4 TABLET, FILM COATED ORAL EVERY 8 HOURS PRN
Qty: 20 TABLET | Refills: 0 | Status: SHIPPED | OUTPATIENT
Start: 2019-03-12 | End: 2019-04-29

## 2019-03-12 NOTE — TELEPHONE ENCOUNTER
SHE HAS THE STOMACH FLU AND WANTS TO KNOW IF DR Nicole Simms WILL CALL BARBARA TO [facility]  34&47     HE MOTHER ALSO HAS THE STOMACH FLU AND THAT IS WHAT SHE IS TAKING

## 2019-03-14 ENCOUNTER — TELEPHONE (OUTPATIENT)
Dept: FAMILY MEDICINE CLINIC | Facility: CLINIC | Age: 59
End: 2019-03-14

## 2019-03-14 ENCOUNTER — OFFICE VISIT (OUTPATIENT)
Dept: FAMILY MEDICINE CLINIC | Facility: CLINIC | Age: 59
End: 2019-03-14
Payer: COMMERCIAL

## 2019-03-14 VITALS
WEIGHT: 182 LBS | TEMPERATURE: 97 F | DIASTOLIC BLOOD PRESSURE: 82 MMHG | BODY MASS INDEX: 30.32 KG/M2 | SYSTOLIC BLOOD PRESSURE: 128 MMHG | RESPIRATION RATE: 12 BRPM | HEART RATE: 72 BPM | HEIGHT: 65 IN

## 2019-03-14 DIAGNOSIS — K52.9 GASTROENTERITIS: Primary | ICD-10-CM

## 2019-03-14 PROCEDURE — 99213 OFFICE O/P EST LOW 20 MIN: CPT | Performed by: INTERNAL MEDICINE

## 2019-03-14 NOTE — TELEPHONE ENCOUNTER
Patient said she is still feeling achy and she has leg cramps so she thinks her Potassium is low, she is still nauseated. She has another issue that she needs to talk to her about.  She does not want to wait until next week and she does not want to see anyo

## 2019-03-16 NOTE — PROGRESS NOTES
Lexus Shahid is a 60year old female. HPI:   Pt has been having stress with fathers illness. He has MRSA and pt is concerned about her exposure risk. She has been ill with gastroenteritis.      Current Outpatient Medications:  Ondansetron HCl (ZOFRAN) 4 daily. Disp:  Rfl:    Saccharomyces boulardii (FLORASTOR OR)  Disp:  Rfl:    vitamin E 100 UNITS Oral Cap Take 100 Units by mouth daily. Disp:  Rfl:    Biotin 1 MG Oral Cap Take by mouth.  Disp:  Rfl:       Past Medical History:   Diagnosis Date   • Allergi Imaging & Consults:  None    Follow up as needed. The patient indicates understanding of these issues and agrees to the plan.

## 2019-04-01 DIAGNOSIS — E78.5 HYPERLIPIDEMIA ASSOCIATED WITH TYPE 2 DIABETES MELLITUS (HCC): ICD-10-CM

## 2019-04-01 DIAGNOSIS — E11.69 HYPERLIPIDEMIA ASSOCIATED WITH TYPE 2 DIABETES MELLITUS (HCC): ICD-10-CM

## 2019-04-02 ENCOUNTER — TELEPHONE (OUTPATIENT)
Dept: FAMILY MEDICINE CLINIC | Facility: CLINIC | Age: 59
End: 2019-04-02

## 2019-04-02 RX ORDER — FLASH GLUCOSE SENSOR
1 KIT MISCELLANEOUS CONTINUOUS
Qty: 1 DEVICE | Refills: 0 | Status: SHIPPED | OUTPATIENT
Start: 2019-04-02 | End: 2020-06-29

## 2019-04-02 RX ORDER — OMEPRAZOLE 40 MG/1
CAPSULE, DELAYED RELEASE ORAL
Qty: 90 CAPSULE | Refills: 0 | Status: SHIPPED | OUTPATIENT
Start: 2019-04-02 | End: 2019-07-05

## 2019-04-02 RX ORDER — ATORVASTATIN CALCIUM 80 MG/1
TABLET, FILM COATED ORAL
Qty: 90 TABLET | Refills: 0 | Status: SHIPPED | OUTPATIENT
Start: 2019-04-02 | End: 2019-07-05

## 2019-04-02 NOTE — TELEPHONE ENCOUNTER
Patient said she needs a new scanner for her Galilea 14 day. [facility].

## 2019-04-10 ENCOUNTER — TELEPHONE (OUTPATIENT)
Dept: FAMILY MEDICINE CLINIC | Facility: CLINIC | Age: 59
End: 2019-04-10

## 2019-04-10 NOTE — TELEPHONE ENCOUNTER
Pt is having surgery for a torn meniscus, needs a EKG, pt saw aida a month ago, does she need to see the doctor or can she just get the EKG?

## 2019-04-10 NOTE — TELEPHONE ENCOUNTER
Patient said that the surgery is scheduled next Friday with Dr Nilesh Zavaleta outpatient at the [facility]. Pat from Dr schneider's office states that patient does not need pre-op clearance she just needs the EKG. Dao Whitman for nurse appointment?

## 2019-04-15 ENCOUNTER — NURSE ONLY (OUTPATIENT)
Dept: FAMILY MEDICINE CLINIC | Facility: CLINIC | Age: 59
End: 2019-04-15
Payer: COMMERCIAL

## 2019-04-15 DIAGNOSIS — Z01.818 PRE-OP TESTING: Primary | ICD-10-CM

## 2019-04-15 PROCEDURE — 93000 ELECTROCARDIOGRAM COMPLETE: CPT | Performed by: FAMILY MEDICINE

## 2019-04-15 NOTE — PROGRESS NOTES
Patient presents today for EKG for Dr Kendra Lee. [facility]. Order placed from tel enc from 4/10/19. Faxed EKG to Karolina Live at [facility] at [phone]. Patient left office in stable condition.

## 2019-04-22 ENCOUNTER — TELEPHONE (OUTPATIENT)
Dept: FAMILY MEDICINE CLINIC | Facility: CLINIC | Age: 59
End: 2019-04-22

## 2019-04-22 DIAGNOSIS — R19.7 DIARRHEA, UNSPECIFIED TYPE: Primary | ICD-10-CM

## 2019-04-22 NOTE — TELEPHONE ENCOUNTER
Patient said that she has had diarrhea since Saturday, she has tried Immodium, Pepto Bismol, Florastor, and Zofran but \"nothing will stop it\". She said she is having watery diarrhea every couple hours and has had some incontinence of stool.  She had knee

## 2019-04-22 NOTE — TELEPHONE ENCOUNTER
I would get a stool pcr for c diff. To make sure it is not that.  She got antibiotics with her surgery

## 2019-04-25 RX ORDER — BUPROPION HYDROCHLORIDE 75 MG/1
TABLET ORAL
Qty: 180 TABLET | Refills: 0 | Status: SHIPPED | OUTPATIENT
Start: 2019-04-25 | End: 2019-08-02

## 2019-04-25 NOTE — TELEPHONE ENCOUNTER
Last office visit: 3/14/2019  Last refill: 1/28/2019  Requested Prescriptions     Pending Prescriptions Disp Refills   • BUPROPION HCL 75 MG Oral Tab [Pharmacy Med Name: BUPROPION 75MG TABLETS] 180 tablet 0     Sig: TAKE 1 TABLET(75 MG) BY MOUTH TWICE DOMONIQUE

## 2019-05-24 DIAGNOSIS — N18.2 UNCONTROLLED TYPE 2 DIABETES MELLITUS WITH STAGE 2 CHRONIC KIDNEY DISEASE, WITHOUT LONG-TERM CURRENT USE OF INSULIN (HCC): ICD-10-CM

## 2019-05-24 DIAGNOSIS — E11.22 UNCONTROLLED TYPE 2 DIABETES MELLITUS WITH STAGE 2 CHRONIC KIDNEY DISEASE, WITHOUT LONG-TERM CURRENT USE OF INSULIN (HCC): ICD-10-CM

## 2019-05-24 DIAGNOSIS — I10 ESSENTIAL HYPERTENSION, BENIGN: ICD-10-CM

## 2019-05-24 DIAGNOSIS — E11.69 HYPERLIPIDEMIA ASSOCIATED WITH TYPE 2 DIABETES MELLITUS (HCC): ICD-10-CM

## 2019-05-24 DIAGNOSIS — E11.65 UNCONTROLLED TYPE 2 DIABETES MELLITUS WITH STAGE 2 CHRONIC KIDNEY DISEASE, WITHOUT LONG-TERM CURRENT USE OF INSULIN (HCC): ICD-10-CM

## 2019-05-24 DIAGNOSIS — E78.5 HYPERLIPIDEMIA ASSOCIATED WITH TYPE 2 DIABETES MELLITUS (HCC): ICD-10-CM

## 2019-05-24 RX ORDER — LOSARTAN POTASSIUM AND HYDROCHLOROTHIAZIDE 12.5; 5 MG/1; MG/1
TABLET ORAL
Qty: 90 TABLET | Refills: 0 | Status: SHIPPED | OUTPATIENT
Start: 2019-05-24 | End: 2019-08-21

## 2019-05-24 RX ORDER — METFORMIN HYDROCHLORIDE 750 MG/1
TABLET, EXTENDED RELEASE ORAL
Qty: 90 TABLET | Refills: 0 | Status: SHIPPED | OUTPATIENT
Start: 2019-05-24 | End: 2019-08-20

## 2019-05-25 ENCOUNTER — TELEPHONE (OUTPATIENT)
Dept: FAMILY MEDICINE CLINIC | Facility: CLINIC | Age: 59
End: 2019-05-25

## 2019-05-25 RX ORDER — IRBESARTAN 300 MG/1
300 TABLET ORAL NIGHTLY
Qty: 90 TABLET | Refills: 0 | Status: SHIPPED | OUTPATIENT
Start: 2019-05-25 | End: 2019-08-19 | Stop reason: ALTCHOICE

## 2019-05-25 RX ORDER — HYDROCHLOROTHIAZIDE 12.5 MG/1
12.5 CAPSULE, GELATIN COATED ORAL DAILY
Qty: 90 CAPSULE | Refills: 0 | Status: SHIPPED | OUTPATIENT
Start: 2019-05-25 | End: 2019-08-19

## 2019-05-25 NOTE — TELEPHONE ENCOUNTER
IS THERE ANOTHER MED SHE CAN TAKE FOR LOSARTAN POTASSIUM-HCTZ 50-12.5 MG Oral Tab, PHARMACY WON'T REFILL DUE TO A MANUFACTORER ERROR

## 2019-06-27 ENCOUNTER — PATIENT OUTREACH (OUTPATIENT)
Dept: FAMILY MEDICINE CLINIC | Facility: CLINIC | Age: 59
End: 2019-06-27

## 2019-07-05 DIAGNOSIS — E78.5 HYPERLIPIDEMIA ASSOCIATED WITH TYPE 2 DIABETES MELLITUS: ICD-10-CM

## 2019-07-05 DIAGNOSIS — E11.69 HYPERLIPIDEMIA ASSOCIATED WITH TYPE 2 DIABETES MELLITUS: ICD-10-CM

## 2019-07-05 RX ORDER — ATORVASTATIN CALCIUM 80 MG/1
TABLET, FILM COATED ORAL
Qty: 90 TABLET | Refills: 0 | Status: SHIPPED | OUTPATIENT
Start: 2019-07-05 | End: 2019-08-21

## 2019-07-05 RX ORDER — OMEPRAZOLE 40 MG/1
CAPSULE, DELAYED RELEASE ORAL
Qty: 90 CAPSULE | Refills: 0 | Status: SHIPPED | OUTPATIENT
Start: 2019-07-05 | End: 2019-10-08

## 2019-07-09 ENCOUNTER — HOSPITAL ENCOUNTER (EMERGENCY)
Facility: HOSPITAL | Age: 59
Discharge: HOME | End: 2019-07-10
Payer: COMMERCIAL

## 2019-07-09 DIAGNOSIS — M25.50 ARTHRALGIA, UNSPECIFIED JOINT: ICD-10-CM

## 2019-07-09 DIAGNOSIS — N30.90 BLADDER INFECTION: Primary | ICD-10-CM

## 2019-07-09 PROCEDURE — 96374 THER/PROPH/DIAG INJ IV PUSH: CPT

## 2019-07-09 PROCEDURE — 85025 COMPLETE CBC W/AUTO DIFF WBC: CPT

## 2019-07-09 PROCEDURE — 80053 COMPREHEN METABOLIC PANEL: CPT

## 2019-07-09 PROCEDURE — 81001 URINALYSIS AUTO W/SCOPE: CPT

## 2019-07-09 PROCEDURE — 85652 RBC SED RATE AUTOMATED: CPT

## 2019-07-09 PROCEDURE — 99284 EMERGENCY DEPT VISIT MOD MDM: CPT

## 2019-07-09 PROCEDURE — 86403 PARTICLE AGGLUT ANTBDY SCRN: CPT

## 2019-07-09 PROCEDURE — 87086 URINE CULTURE/COLONY COUNT: CPT

## 2019-07-09 PROCEDURE — 86140 C-REACTIVE PROTEIN: CPT

## 2019-07-10 ENCOUNTER — APPOINTMENT (OUTPATIENT)
Dept: GENERAL RADIOLOGY | Facility: HOSPITAL | Age: 59
End: 2019-07-10
Payer: COMMERCIAL

## 2019-07-10 VITALS
BODY MASS INDEX: 31.58 KG/M2 | DIASTOLIC BLOOD PRESSURE: 82 MMHG | TEMPERATURE: 98 F | HEIGHT: 64 IN | RESPIRATION RATE: 20 BRPM | WEIGHT: 185 LBS | SYSTOLIC BLOOD PRESSURE: 129 MMHG | HEART RATE: 90 BPM | OXYGEN SATURATION: 95 %

## 2019-07-10 LAB
ALBUMIN SERPL-MCNC: 3.5 G/DL (ref 3.4–5)
ALBUMIN/GLOB SERPL: 0.9 {RATIO} (ref 1–2)
ALP LIVER SERPL-CCNC: 117 U/L (ref 46–118)
ALT SERPL-CCNC: 30 U/L (ref 13–56)
ANION GAP SERPL CALC-SCNC: 8 MMOL/L (ref 0–18)
AST SERPL-CCNC: 15 U/L (ref 15–37)
BASOPHILS # BLD AUTO: 0.07 X10(3) UL (ref 0–0.2)
BASOPHILS NFR BLD AUTO: 0.8 %
BILIRUB SERPL-MCNC: 0.4 MG/DL (ref 0.1–2)
BILIRUB UR QL STRIP.AUTO: NEGATIVE
BUN BLD-MCNC: 20 MG/DL (ref 7–18)
BUN/CREAT SERPL: 26 (ref 10–20)
CALCIUM BLD-MCNC: 9.5 MG/DL (ref 8.5–10.1)
CHLORIDE SERPL-SCNC: 104 MMOL/L (ref 98–112)
CLARITY UR REFRACT.AUTO: CLEAR
CO2 SERPL-SCNC: 26 MMOL/L (ref 21–32)
COLOR UR AUTO: YELLOW
CREAT BLD-MCNC: 0.77 MG/DL (ref 0.55–1.02)
CRP SERPL-MCNC: 2.41 MG/DL (ref ?–0.3)
DEPRECATED RDW RBC AUTO: 41.1 FL (ref 35.1–46.3)
EOSINOPHIL # BLD AUTO: 0.19 X10(3) UL (ref 0–0.7)
EOSINOPHIL NFR BLD AUTO: 2.2 %
ERYTHROCYTE [DISTWIDTH] IN BLOOD BY AUTOMATED COUNT: 13.6 % (ref 11–15)
GLOBULIN PLAS-MCNC: 3.7 G/DL (ref 2.8–4.4)
GLUCOSE BLD-MCNC: 157 MG/DL (ref 70–99)
GLUCOSE UR STRIP.AUTO-MCNC: >=500 MG/DL
HCT VFR BLD AUTO: 41 % (ref 35–48)
HGB BLD-MCNC: 13.6 G/DL (ref 12–16)
IMM GRANULOCYTES # BLD AUTO: 0.04 X10(3) UL (ref 0–1)
IMM GRANULOCYTES NFR BLD: 0.5 %
KETONES UR STRIP.AUTO-MCNC: NEGATIVE MG/DL
LYMPHOCYTES # BLD AUTO: 3.25 X10(3) UL (ref 1–4)
LYMPHOCYTES NFR BLD AUTO: 37.4 %
M PROTEIN MFR SERPL ELPH: 7.2 G/DL (ref 6.4–8.2)
MCH RBC QN AUTO: 27.9 PG (ref 26–34)
MCHC RBC AUTO-ENTMCNC: 33.2 G/DL (ref 31–37)
MCV RBC AUTO: 84 FL (ref 80–100)
MONOCYTES # BLD AUTO: 0.77 X10(3) UL (ref 0.1–1)
MONOCYTES NFR BLD AUTO: 8.9 %
MONOSCREEN: NEGATIVE
NEUTROPHILS # BLD AUTO: 4.36 X10 (3) UL (ref 1.5–7.7)
NEUTROPHILS # BLD AUTO: 4.36 X10(3) UL (ref 1.5–7.7)
NEUTROPHILS NFR BLD AUTO: 50.2 %
NITRITE UR QL STRIP.AUTO: NEGATIVE
OSMOLALITY SERPL CALC.SUM OF ELEC: 292 MOSM/KG (ref 275–295)
PH UR STRIP.AUTO: 7 [PH] (ref 4.5–8)
PLATELET # BLD AUTO: 354 10(3)UL (ref 150–450)
POTASSIUM SERPL-SCNC: 3.7 MMOL/L (ref 3.5–5.1)
PROT UR STRIP.AUTO-MCNC: 100 MG/DL
RBC # BLD AUTO: 4.88 X10(6)UL (ref 3.8–5.3)
RBC UR QL AUTO: NEGATIVE
SED RATE-ML: 35 MM/HR (ref 0–25)
SODIUM SERPL-SCNC: 138 MMOL/L (ref 136–145)
SP GR UR STRIP.AUTO: 1.03 (ref 1–1.03)
UROBILINOGEN UR STRIP.AUTO-MCNC: <2 MG/DL
WBC # BLD AUTO: 8.7 X10(3) UL (ref 4–11)

## 2019-07-10 PROCEDURE — 71046 X-RAY EXAM CHEST 2 VIEWS: CPT

## 2019-07-10 RX ORDER — NITROFURANTOIN 25; 75 MG/1; MG/1
100 CAPSULE ORAL 2 TIMES DAILY
Qty: 10 CAPSULE | Refills: 0 | Status: SHIPPED | OUTPATIENT
Start: 2019-07-10 | End: 2019-07-15

## 2019-07-10 RX ORDER — KETOROLAC TROMETHAMINE 30 MG/ML
10 INJECTION, SOLUTION INTRAMUSCULAR; INTRAVENOUS ONCE
Status: COMPLETED | OUTPATIENT
Start: 2019-07-10 | End: 2019-07-10

## 2019-07-10 NOTE — ED PROVIDER NOTES
Patient Seen in: [facility] Emergency Department    History   Patient presents with:  Fatigue (constitutional, neurologic)    Stated Complaint: not feeling well, fatigue    HPI    This 48-year-old female with history of bilateral knee pains is in th different over the last week or 2. She denies any sinus symptoms or dental symptoms or any recent procedures.                       Past Medical History:   Diagnosis Date   • Allergic rhinitis due to pollen 5/7/2012   • Diverticulosis of colon (without men (Oral)   Resp 20   Ht 162.6 cm (5' 4\")   Wt 83.9 kg   SpO2 95%   BMI 31.76 kg/m²         Physical Exam    GENERAL APPEARANCE:     Well developed, well nourished, alert       Head: Normocephalic and atraumatic.      Sclera anicteric, conjunctiva pink and mo (*)     A/G Ratio 0.9 (*)     All other components within normal limits   SED RATE, WESTERGREN (AUTOMATED) - Abnormal; Notable for the following components:    Sed Rate 35 (*)     All other components within normal limits   C-REACTIVE PROTEIN - Abnormal; N immediate return to the emergency department. Reasonable over the counter and prescription treatment options and Physician follow up plan was discussed. The patient is discharged home in good condition.         Disposition and Plan     Clinical Impre

## 2019-08-03 RX ORDER — BUPROPION HYDROCHLORIDE 75 MG/1
TABLET ORAL
Qty: 180 TABLET | Refills: 0 | Status: SHIPPED | OUTPATIENT
Start: 2019-08-03 | End: 2019-11-13

## 2019-08-19 ENCOUNTER — TELEPHONE (OUTPATIENT)
Dept: FAMILY MEDICINE CLINIC | Facility: CLINIC | Age: 59
End: 2019-08-19

## 2019-08-19 RX ORDER — LEVOCETIRIZINE DIHYDROCHLORIDE 5 MG/1
5 TABLET, FILM COATED ORAL EVERY EVENING
COMMUNITY

## 2019-08-19 NOTE — TELEPHONE ENCOUNTER
Patient said that she has to have another surgery on her knee. She said physical therapy \"tore her knee\". She said that patient said she needs another EKG before the surgery. She is having surgery next week.  She wants to know if she can go over to [facility] to

## 2019-08-20 ENCOUNTER — OFFICE VISIT (OUTPATIENT)
Dept: FAMILY MEDICINE CLINIC | Facility: CLINIC | Age: 59
End: 2019-08-20
Payer: COMMERCIAL

## 2019-08-20 VITALS
WEIGHT: 181 LBS | SYSTOLIC BLOOD PRESSURE: 126 MMHG | RESPIRATION RATE: 20 BRPM | DIASTOLIC BLOOD PRESSURE: 82 MMHG | HEART RATE: 115 BPM | HEIGHT: 65 IN | TEMPERATURE: 99 F | BODY MASS INDEX: 30.16 KG/M2

## 2019-08-20 DIAGNOSIS — G89.29 CHRONIC PAIN OF LEFT KNEE: Primary | ICD-10-CM

## 2019-08-20 DIAGNOSIS — I10 ESSENTIAL HYPERTENSION, BENIGN: ICD-10-CM

## 2019-08-20 DIAGNOSIS — N18.2 UNCONTROLLED TYPE 2 DIABETES MELLITUS WITH STAGE 2 CHRONIC KIDNEY DISEASE, WITHOUT LONG-TERM CURRENT USE OF INSULIN (HCC): ICD-10-CM

## 2019-08-20 DIAGNOSIS — IMO0002 UNCONTROLLED TYPE 2 DIABETES MELLITUS WITH STAGE 2 CHRONIC KIDNEY DISEASE, WITHOUT LONG-TERM CURRENT USE OF INSULIN: ICD-10-CM

## 2019-08-20 DIAGNOSIS — M25.562 CHRONIC PAIN OF LEFT KNEE: Primary | ICD-10-CM

## 2019-08-20 DIAGNOSIS — E11.65 UNCONTROLLED TYPE 2 DIABETES MELLITUS WITH STAGE 2 CHRONIC KIDNEY DISEASE, WITHOUT LONG-TERM CURRENT USE OF INSULIN (HCC): ICD-10-CM

## 2019-08-20 DIAGNOSIS — E78.5 HYPERLIPIDEMIA ASSOCIATED WITH TYPE 2 DIABETES MELLITUS: ICD-10-CM

## 2019-08-20 DIAGNOSIS — E78.5 HYPERLIPIDEMIA ASSOCIATED WITH TYPE 2 DIABETES MELLITUS (HCC): ICD-10-CM

## 2019-08-20 DIAGNOSIS — E11.22 UNCONTROLLED TYPE 2 DIABETES MELLITUS WITH STAGE 2 CHRONIC KIDNEY DISEASE, WITHOUT LONG-TERM CURRENT USE OF INSULIN (HCC): ICD-10-CM

## 2019-08-20 DIAGNOSIS — E11.69 HYPERLIPIDEMIA ASSOCIATED WITH TYPE 2 DIABETES MELLITUS: ICD-10-CM

## 2019-08-20 DIAGNOSIS — E11.69 HYPERLIPIDEMIA ASSOCIATED WITH TYPE 2 DIABETES MELLITUS (HCC): ICD-10-CM

## 2019-08-20 PROCEDURE — 99243 OFF/OP CNSLTJ NEW/EST LOW 30: CPT | Performed by: INTERNAL MEDICINE

## 2019-08-20 PROCEDURE — 93000 ELECTROCARDIOGRAM COMPLETE: CPT | Performed by: INTERNAL MEDICINE

## 2019-08-20 NOTE — PROGRESS NOTES
Tonja Stuart is a 60year old female who presents for a pre-operative physical exam. Patient is to have repeat left knee surgery, to be done by Dr. Mami Moser at [address] on 8/27/2019. HPI:   Pt complains of left knee pain. She has a meniscal tear.   She n route every 30 (thirty) days. Disp: 3 each Rfl: 11   Multiple Vitamins-Minerals (CENTRUM SILVER) Oral Tab Take 1 tablet by mouth daily. Disp:  Rfl:    Saccharomyces boulardii (FLORASTOR OR) Take 1 tablet by mouth daily.    Disp:  Rfl:       Allergies:   Amber Tobacco Use      Smoking status: Never Smoker      Smokeless tobacco: Never Used    Alcohol use: No      Alcohol/week: 0.0 standard drinks    Drug use: No     Occ: retired. : yes. Children: yes. Exercise: none.   Diet: watches minimally     REVIEW conditions. Pt is a good surgical candidate. This consult was sent back the referring physician.

## 2019-08-21 RX ORDER — METFORMIN HYDROCHLORIDE 750 MG/1
TABLET, EXTENDED RELEASE ORAL
Qty: 90 TABLET | Refills: 0 | Status: SHIPPED | OUTPATIENT
Start: 2019-08-21 | End: 2019-11-22

## 2019-08-21 RX ORDER — ATORVASTATIN CALCIUM 40 MG/1
40 TABLET, FILM COATED ORAL NIGHTLY
Qty: 90 TABLET | Refills: 0 | Status: SHIPPED | OUTPATIENT
Start: 2019-08-21 | End: 2019-11-13

## 2019-08-21 RX ORDER — LOSARTAN POTASSIUM AND HYDROCHLOROTHIAZIDE 12.5; 5 MG/1; MG/1
1 TABLET ORAL DAILY
Qty: 90 TABLET | Refills: 0 | Status: SHIPPED | OUTPATIENT
Start: 2019-08-21 | End: 2020-01-06

## 2019-08-21 NOTE — TELEPHONE ENCOUNTER
Metformin ER refill request.     Last office visit: yesterday  Last refill: 5/24/2019, #90  Labs due: 8/20/2019    Future Appointments   Date Time Provider Rolan Miles   8/27/2019  7:00 AM Josep Campbell MD [facility]   9/5/2019  9:00 AM Josep Campbell MD [facility]

## 2019-08-26 ENCOUNTER — ANESTHESIA EVENT (OUTPATIENT)
Dept: SURGERY | Facility: HOSPITAL | Age: 59
End: 2019-08-26
Payer: COMMERCIAL

## 2019-08-27 ENCOUNTER — HOSPITAL ENCOUNTER (OUTPATIENT)
Facility: HOSPITAL | Age: 59
Setting detail: HOSPITAL OUTPATIENT SURGERY
Discharge: HOME | End: 2019-08-27
Attending: ORTHOPAEDIC SURGERY | Admitting: ORTHOPAEDIC SURGERY
Payer: COMMERCIAL

## 2019-08-27 ENCOUNTER — ANESTHESIA (OUTPATIENT)
Dept: SURGERY | Facility: HOSPITAL | Age: 59
End: 2019-08-27
Payer: COMMERCIAL

## 2019-08-27 VITALS
WEIGHT: 181.44 LBS | SYSTOLIC BLOOD PRESSURE: 136 MMHG | DIASTOLIC BLOOD PRESSURE: 75 MMHG | BODY MASS INDEX: 29.16 KG/M2 | OXYGEN SATURATION: 100 % | HEART RATE: 71 BPM | TEMPERATURE: 97 F | HEIGHT: 66 IN | RESPIRATION RATE: 16 BRPM

## 2019-08-27 DIAGNOSIS — S83.242D TEAR OF MEDIAL MENISCUS OF LEFT KNEE, CURRENT, SUBSEQUENT ENCOUNTER: ICD-10-CM

## 2019-08-27 LAB
GLUCOSE BLD-MCNC: 178 MG/DL (ref 70–99)
GLUCOSE BLD-MCNC: 223 MG/DL (ref 70–99)

## 2019-08-27 PROCEDURE — 82962 GLUCOSE BLOOD TEST: CPT

## 2019-08-27 PROCEDURE — 0SBD4ZZ EXCISION OF LEFT KNEE JOINT, PERCUTANEOUS ENDOSCOPIC APPROACH: ICD-10-PCS | Performed by: ORTHOPAEDIC SURGERY

## 2019-08-27 RX ORDER — ACETAMINOPHEN 500 MG
1000 TABLET ORAL ONCE AS NEEDED
Status: DISCONTINUED | OUTPATIENT
Start: 2019-08-27 | End: 2019-08-27

## 2019-08-27 RX ORDER — DEXTROSE MONOHYDRATE 25 G/50ML
50 INJECTION, SOLUTION INTRAVENOUS
Status: DISCONTINUED | OUTPATIENT
Start: 2019-08-27 | End: 2019-08-27

## 2019-08-27 RX ORDER — ACETAMINOPHEN 500 MG
1000 TABLET ORAL ONCE
Status: DISCONTINUED | OUTPATIENT
Start: 2019-08-27 | End: 2019-08-27

## 2019-08-27 RX ORDER — ACETAMINOPHEN 500 MG
1000 TABLET ORAL ONCE
COMMUNITY

## 2019-08-27 RX ORDER — NALOXONE HYDROCHLORIDE 0.4 MG/ML
80 INJECTION, SOLUTION INTRAMUSCULAR; INTRAVENOUS; SUBCUTANEOUS AS NEEDED
Status: DISCONTINUED | OUTPATIENT
Start: 2019-08-27 | End: 2019-08-27

## 2019-08-27 RX ORDER — HYDROMORPHONE HYDROCHLORIDE 1 MG/ML
INJECTION, SOLUTION INTRAMUSCULAR; INTRAVENOUS; SUBCUTANEOUS
Status: COMPLETED
Start: 2019-08-27 | End: 2019-08-27

## 2019-08-27 RX ORDER — SCOLOPAMINE TRANSDERMAL SYSTEM 1 MG/1
1 PATCH, EXTENDED RELEASE TRANSDERMAL ONCE
Status: DISCONTINUED | OUTPATIENT
Start: 2019-08-27 | End: 2019-08-27

## 2019-08-27 RX ORDER — SCOLOPAMINE TRANSDERMAL SYSTEM 1 MG/1
PATCH, EXTENDED RELEASE TRANSDERMAL
Status: DISCONTINUED
Start: 2019-08-27 | End: 2019-08-27

## 2019-08-27 RX ORDER — DEXTROSE MONOHYDRATE 25 G/50ML
50 INJECTION, SOLUTION INTRAVENOUS
Status: DISCONTINUED | OUTPATIENT
Start: 2019-08-27 | End: 2019-08-27 | Stop reason: HOSPADM

## 2019-08-27 RX ORDER — OXYCODONE HYDROCHLORIDE 5 MG/1
5 TABLET ORAL EVERY 4 HOURS PRN
Status: DISCONTINUED | OUTPATIENT
Start: 2019-08-27 | End: 2019-08-27

## 2019-08-27 RX ORDER — OXYCODONE HYDROCHLORIDE AND ACETAMINOPHEN 5; 325 MG/1; MG/1
1 TABLET ORAL EVERY 4 HOURS PRN
Status: DISCONTINUED | OUTPATIENT
Start: 2019-08-27 | End: 2019-08-27

## 2019-08-27 RX ORDER — BUPIVACAINE HYDROCHLORIDE AND EPINEPHRINE 5; 5 MG/ML; UG/ML
INJECTION, SOLUTION EPIDURAL; INTRACAUDAL; PERINEURAL AS NEEDED
Status: DISCONTINUED | OUTPATIENT
Start: 2019-08-27 | End: 2019-08-27 | Stop reason: HOSPADM

## 2019-08-27 RX ORDER — CLINDAMYCIN PHOSPHATE 900 MG/50ML
900 INJECTION INTRAVENOUS EVERY 8 HOURS
Status: DISCONTINUED | OUTPATIENT
Start: 2019-08-27 | End: 2019-08-27 | Stop reason: HOSPADM

## 2019-08-27 RX ORDER — INSULIN ASPART 100 [IU]/ML
INJECTION, SOLUTION INTRAVENOUS; SUBCUTANEOUS
Status: DISCONTINUED
Start: 2019-08-27 | End: 2019-08-27 | Stop reason: WASHOUT

## 2019-08-27 RX ORDER — SODIUM CHLORIDE, SODIUM LACTATE, POTASSIUM CHLORIDE, CALCIUM CHLORIDE 600; 310; 30; 20 MG/100ML; MG/100ML; MG/100ML; MG/100ML
INJECTION, SOLUTION INTRAVENOUS CONTINUOUS
Status: DISCONTINUED | OUTPATIENT
Start: 2019-08-27 | End: 2019-08-27

## 2019-08-27 RX ORDER — OXYCODONE AND ACETAMINOPHEN 10; 325 MG/1; MG/1
1 TABLET ORAL EVERY 4 HOURS PRN
Qty: 60 TABLET | Refills: 0 | Status: SHIPPED | OUTPATIENT
Start: 2019-08-27 | End: 2019-09-06

## 2019-08-27 RX ORDER — INSULIN ASPART 100 [IU]/ML
3 INJECTION, SOLUTION INTRAVENOUS; SUBCUTANEOUS ONCE
Status: COMPLETED | OUTPATIENT
Start: 2019-08-27 | End: 2019-08-27

## 2019-08-27 RX ORDER — HYDROMORPHONE HYDROCHLORIDE 1 MG/ML
0.4 INJECTION, SOLUTION INTRAMUSCULAR; INTRAVENOUS; SUBCUTANEOUS EVERY 5 MIN PRN
Status: DISCONTINUED | OUTPATIENT
Start: 2019-08-27 | End: 2019-08-27

## 2019-08-27 RX ORDER — OXYCODONE HYDROCHLORIDE AND ACETAMINOPHEN 5; 325 MG/1; MG/1
2 TABLET ORAL EVERY 4 HOURS PRN
Status: DISCONTINUED | OUTPATIENT
Start: 2019-08-27 | End: 2019-08-27

## 2019-08-27 NOTE — ANESTHESIA POSTPROCEDURE EVALUATION
[address] Patient Status:  Hospital Outpatient Surgery   Age/Gender 60year old female MRN [id number]   [facility] SURGERY Attending Pacheco Moreno MD   Hosp Day # 0 PCP Consuelo Holloway MD       Anesthesia Post-op Note

## 2019-08-27 NOTE — H&P
59 y/o female with left knee medial meniscus tear  Past Medical History:   Diagnosis Date   • Allergic rhinitis due to pollen 5/7/2012   • Diverticulosis of colon (without mention of hemorrhage) 12/15/2008   • Essential hypertension, benign 11/5/2007   • H 11   Continuous Blood Gluc  (FREESTYLE AMANDA READER) Does not apply Device Use reader as directed Disp: 1 Device Rfl: 0   Continuous Blood Gluc Sensor ([address] [place] [address]) Does not apply Misc 3 each by Does not apply route every 30 (thir Intimate partner violence:        Fear of current or ex partner: Not on file        Emotionally abused: Not on file        Physically abused: Not on file        Forced sexual activity: Not on file    Other Topics      Concerns:         Service: Not intervention.   We discussed the surgical risks and complications which include but are not limited to infection, nerve injury, vessel damage, loss of limb/amputation, fracture/bony injury, continued pain, painful scar, failure of surgery, and need for futu

## 2019-08-27 NOTE — BRIEF OP NOTE
Pre-Operative Diagnosis: Tear of medial meniscus of left knee, current, subsequent encounter [P00.601W]     Post-Operative Diagnosis: *same  Procedure Performed:   Procedure(s):  LEFT KNEE ARTHROSCOPY WITH PARTIAL MEDIAL MENISCECTOMY    Surgeon(s) and Role

## 2019-08-28 NOTE — OPERATIVE REPORT
[address]    PATIENT'S NAME: Coral Johnston   ATTENDING PHYSICIAN: Almita Anaya M.D. OPERATING PHYSICIAN: Almita Anaya M.D.    PATIENT ACCOUNT#:   [de-identified]    LOCATION:  [address] [place] [address] EDW 10  MEDICAL RECORD #:   [id number]       DATE

## 2019-09-26 ENCOUNTER — IMMUNIZATION (OUTPATIENT)
Dept: FAMILY MEDICINE CLINIC | Facility: CLINIC | Age: 59
End: 2019-09-26
Payer: COMMERCIAL

## 2019-09-26 DIAGNOSIS — Z23 NEED FOR VACCINATION: ICD-10-CM

## 2019-09-26 PROCEDURE — 90471 IMMUNIZATION ADMIN: CPT | Performed by: INTERNAL MEDICINE

## 2019-09-26 PROCEDURE — 90686 IIV4 VACC NO PRSV 0.5 ML IM: CPT | Performed by: INTERNAL MEDICINE

## 2019-09-30 ENCOUNTER — OFFICE VISIT (OUTPATIENT)
Dept: FAMILY MEDICINE CLINIC | Facility: CLINIC | Age: 59
End: 2019-09-30
Payer: COMMERCIAL

## 2019-09-30 VITALS
DIASTOLIC BLOOD PRESSURE: 78 MMHG | WEIGHT: 181 LBS | SYSTOLIC BLOOD PRESSURE: 130 MMHG | BODY MASS INDEX: 29 KG/M2 | RESPIRATION RATE: 18 BRPM | TEMPERATURE: 98 F | HEART RATE: 97 BPM

## 2019-09-30 DIAGNOSIS — E11.69 HYPERLIPIDEMIA ASSOCIATED WITH TYPE 2 DIABETES MELLITUS (HCC): ICD-10-CM

## 2019-09-30 DIAGNOSIS — E11.65 UNCONTROLLED TYPE 2 DIABETES MELLITUS WITH STAGE 2 CHRONIC KIDNEY DISEASE, WITHOUT LONG-TERM CURRENT USE OF INSULIN (HCC): ICD-10-CM

## 2019-09-30 DIAGNOSIS — E78.5 HYPERLIPIDEMIA ASSOCIATED WITH TYPE 2 DIABETES MELLITUS (HCC): ICD-10-CM

## 2019-09-30 DIAGNOSIS — N18.2 UNCONTROLLED TYPE 2 DIABETES MELLITUS WITH STAGE 2 CHRONIC KIDNEY DISEASE, WITHOUT LONG-TERM CURRENT USE OF INSULIN (HCC): ICD-10-CM

## 2019-09-30 DIAGNOSIS — E11.22 UNCONTROLLED TYPE 2 DIABETES MELLITUS WITH STAGE 2 CHRONIC KIDNEY DISEASE, WITHOUT LONG-TERM CURRENT USE OF INSULIN (HCC): ICD-10-CM

## 2019-09-30 PROCEDURE — 80053 COMPREHEN METABOLIC PANEL: CPT | Performed by: INTERNAL MEDICINE

## 2019-09-30 PROCEDURE — 99214 OFFICE O/P EST MOD 30 MIN: CPT | Performed by: INTERNAL MEDICINE

## 2019-09-30 PROCEDURE — 80061 LIPID PANEL: CPT | Performed by: INTERNAL MEDICINE

## 2019-09-30 PROCEDURE — 83036 HEMOGLOBIN GLYCOSYLATED A1C: CPT | Performed by: INTERNAL MEDICINE

## 2019-09-30 NOTE — PROGRESS NOTES
Tonja Stuart is a 60year old female. HPI:   Pt has been having pain and exhausted after her second left knee in late August.  First time she came back relatively quickly, but after this repair, just feels defeated.   Monica a lot, not motivated, just reti Take 1 tablet (10 mg total) by mouth 2 (two) times daily before meals. Disp: 180 tablet Rfl: 3   FARXIGA 10 MG Oral Tab Take 10 mg by mouth daily.  Disp: 90 tablet Rfl: 3   Continuous Blood Gluc  (FREESTYLE AMANDA 14 DAY READER) Does not apply Device Left arm, Patient Position: Sitting, Cuff Size: large)   Pulse 97   Temp 97.6 °F (36.4 °C) (Temporal)   Resp 18   Wt 181 lb (82.1 kg)   BMI 29.21 kg/m²   GENERAL: well developed, well nourished,in no apparent distress  SKIN: no rashes,no suspicious lesions

## 2019-10-08 RX ORDER — OMEPRAZOLE 40 MG/1
CAPSULE, DELAYED RELEASE ORAL
Qty: 90 CAPSULE | Refills: 0 | Status: SHIPPED | OUTPATIENT
Start: 2019-10-08 | End: 2020-01-06

## 2019-10-14 ENCOUNTER — TELEPHONE (OUTPATIENT)
Dept: FAMILY MEDICINE CLINIC | Facility: CLINIC | Age: 59
End: 2019-10-14

## 2019-10-14 NOTE — TELEPHONE ENCOUNTER
She is going to be flying in the next couple of weeks. Asking for something for anxiety. Waleen/brand [employer]. States she is somewhat resistant to the desired effect of drugs, wonders if she needs a higher dose than average.

## 2019-10-21 NOTE — TELEPHONE ENCOUNTER
She CANNOT take a benzo and percocet together. If she is OFF all narcotics I can give her 4 1 mg Xanax for her plane to [place], I DO NOT want her having a problem in the air.

## 2019-10-22 ENCOUNTER — TELEPHONE (OUTPATIENT)
Dept: FAMILY MEDICINE CLINIC | Facility: CLINIC | Age: 59
End: 2019-10-22

## 2019-10-22 RX ORDER — ALPRAZOLAM 1 MG/1
0.5 TABLET ORAL 2 TIMES DAILY PRN
Qty: 4 TABLET | Refills: 0 | Status: SHIPPED | OUTPATIENT
Start: 2019-10-22 | End: 2021-11-02

## 2019-10-22 NOTE — TELEPHONE ENCOUNTER
Patient said that she had talked to Dr Lidia Pearson about getting medication to help her relax on her flight to [facility] November 4th. She said the last time she flew was 20 years ago and she had a \"horrible anxiety attack\".  Please send to [facility] on [address]

## 2019-11-13 RX ORDER — ATORVASTATIN CALCIUM 40 MG/1
TABLET, FILM COATED ORAL
Qty: 90 TABLET | Refills: 0 | Status: SHIPPED | OUTPATIENT
Start: 2019-11-13 | End: 2020-02-14

## 2019-11-13 RX ORDER — BUPROPION HYDROCHLORIDE 75 MG/1
TABLET ORAL
Qty: 180 TABLET | Refills: 0 | Status: SHIPPED | OUTPATIENT
Start: 2019-11-13 | End: 2020-04-05

## 2019-11-22 DIAGNOSIS — E11.22 UNCONTROLLED TYPE 2 DIABETES MELLITUS WITH STAGE 2 CHRONIC KIDNEY DISEASE, WITHOUT LONG-TERM CURRENT USE OF INSULIN (HCC): ICD-10-CM

## 2019-11-22 DIAGNOSIS — E11.65 UNCONTROLLED TYPE 2 DIABETES MELLITUS WITH STAGE 2 CHRONIC KIDNEY DISEASE, WITHOUT LONG-TERM CURRENT USE OF INSULIN (HCC): ICD-10-CM

## 2019-11-22 DIAGNOSIS — E78.5 HYPERLIPIDEMIA ASSOCIATED WITH TYPE 2 DIABETES MELLITUS (HCC): ICD-10-CM

## 2019-11-22 DIAGNOSIS — I10 ESSENTIAL HYPERTENSION, BENIGN: ICD-10-CM

## 2019-11-22 DIAGNOSIS — N18.2 UNCONTROLLED TYPE 2 DIABETES MELLITUS WITH STAGE 2 CHRONIC KIDNEY DISEASE, WITHOUT LONG-TERM CURRENT USE OF INSULIN (HCC): ICD-10-CM

## 2019-11-22 DIAGNOSIS — E11.69 HYPERLIPIDEMIA ASSOCIATED WITH TYPE 2 DIABETES MELLITUS (HCC): ICD-10-CM

## 2019-11-22 RX ORDER — METFORMIN HYDROCHLORIDE 750 MG/1
TABLET, EXTENDED RELEASE ORAL
Qty: 90 TABLET | Refills: 0 | Status: SHIPPED | OUTPATIENT
Start: 2019-11-22 | End: 2020-02-14

## 2020-01-06 DIAGNOSIS — N18.2 UNCONTROLLED TYPE 2 DIABETES MELLITUS WITH STAGE 2 CHRONIC KIDNEY DISEASE, WITHOUT LONG-TERM CURRENT USE OF INSULIN (HCC): ICD-10-CM

## 2020-01-06 DIAGNOSIS — E11.65 UNCONTROLLED TYPE 2 DIABETES MELLITUS WITH STAGE 2 CHRONIC KIDNEY DISEASE, WITHOUT LONG-TERM CURRENT USE OF INSULIN (HCC): ICD-10-CM

## 2020-01-06 DIAGNOSIS — E78.5 HYPERLIPIDEMIA ASSOCIATED WITH TYPE 2 DIABETES MELLITUS (HCC): ICD-10-CM

## 2020-01-06 DIAGNOSIS — E11.22 UNCONTROLLED TYPE 2 DIABETES MELLITUS WITH STAGE 2 CHRONIC KIDNEY DISEASE, WITHOUT LONG-TERM CURRENT USE OF INSULIN (HCC): ICD-10-CM

## 2020-01-06 DIAGNOSIS — E11.69 HYPERLIPIDEMIA ASSOCIATED WITH TYPE 2 DIABETES MELLITUS (HCC): ICD-10-CM

## 2020-01-06 DIAGNOSIS — I10 ESSENTIAL HYPERTENSION, BENIGN: ICD-10-CM

## 2020-01-06 RX ORDER — LOSARTAN POTASSIUM AND HYDROCHLOROTHIAZIDE 12.5; 5 MG/1; MG/1
TABLET ORAL
Qty: 90 TABLET | Refills: 0 | Status: SHIPPED | OUTPATIENT
Start: 2020-01-06 | End: 2020-04-05

## 2020-01-06 RX ORDER — OMEPRAZOLE 40 MG/1
CAPSULE, DELAYED RELEASE ORAL
Qty: 90 CAPSULE | Refills: 0 | Status: SHIPPED | OUTPATIENT
Start: 2020-01-06 | End: 2020-04-05

## 2020-01-06 NOTE — TELEPHONE ENCOUNTER
Last office visit: 9/30/19 /78  Last refill: Omeprazole: 10/08/19, Losartan Potassium-HCTZ: 8/21/19  Last labs: 9/30/19  Future Appointments   Date Time Provider Rolan Miles   1/8/2020  8:15 AM Masha Bains MD [facility]

## 2020-02-14 DIAGNOSIS — E11.22 UNCONTROLLED TYPE 2 DIABETES MELLITUS WITH STAGE 2 CHRONIC KIDNEY DISEASE, WITHOUT LONG-TERM CURRENT USE OF INSULIN (HCC): ICD-10-CM

## 2020-02-14 DIAGNOSIS — I10 ESSENTIAL HYPERTENSION, BENIGN: ICD-10-CM

## 2020-02-14 DIAGNOSIS — N18.2 UNCONTROLLED TYPE 2 DIABETES MELLITUS WITH STAGE 2 CHRONIC KIDNEY DISEASE, WITHOUT LONG-TERM CURRENT USE OF INSULIN (HCC): ICD-10-CM

## 2020-02-14 DIAGNOSIS — E78.5 HYPERLIPIDEMIA ASSOCIATED WITH TYPE 2 DIABETES MELLITUS (HCC): ICD-10-CM

## 2020-02-14 DIAGNOSIS — E11.69 HYPERLIPIDEMIA ASSOCIATED WITH TYPE 2 DIABETES MELLITUS (HCC): ICD-10-CM

## 2020-02-14 DIAGNOSIS — E11.65 UNCONTROLLED TYPE 2 DIABETES MELLITUS WITH STAGE 2 CHRONIC KIDNEY DISEASE, WITHOUT LONG-TERM CURRENT USE OF INSULIN (HCC): ICD-10-CM

## 2020-02-14 RX ORDER — GLIPIZIDE 10 MG/1
TABLET, FILM COATED, EXTENDED RELEASE ORAL
Qty: 180 TABLET | Refills: 3 | Status: SHIPPED | OUTPATIENT
Start: 2020-02-14 | End: 2021-02-26

## 2020-02-14 RX ORDER — DAPAGLIFLOZIN 10 MG/1
TABLET, FILM COATED ORAL
Qty: 90 TABLET | Refills: 3 | Status: SHIPPED | OUTPATIENT
Start: 2020-02-14 | End: 2021-02-26

## 2020-02-14 RX ORDER — METFORMIN HYDROCHLORIDE 750 MG/1
TABLET, EXTENDED RELEASE ORAL
Qty: 90 TABLET | Refills: 0 | Status: SHIPPED | OUTPATIENT
Start: 2020-02-14 | End: 2020-05-11

## 2020-02-14 RX ORDER — ATORVASTATIN CALCIUM 40 MG/1
TABLET, FILM COATED ORAL
Qty: 90 TABLET | Refills: 0 | Status: SHIPPED | OUTPATIENT
Start: 2020-02-14 | End: 2020-05-11

## 2020-02-14 NOTE — TELEPHONE ENCOUNTER
Lasat refill on atorvastatin #90 on 11/13/19  Last refill on glipizide #90x 3 on 2/25/19  Last refill on metformin #90 on 11/22/19  Last refill on farxiga #90 x 3 on 2/25/19  Last office visit on 9/30/19  No future appointments.   Last labs on 9/30/19

## 2020-03-09 ENCOUNTER — OFFICE VISIT (OUTPATIENT)
Dept: FAMILY MEDICINE CLINIC | Facility: CLINIC | Age: 60
End: 2020-03-09
Payer: COMMERCIAL

## 2020-03-09 VITALS
OXYGEN SATURATION: 98 % | HEART RATE: 78 BPM | BODY MASS INDEX: 29.09 KG/M2 | WEIGHT: 181 LBS | TEMPERATURE: 99 F | RESPIRATION RATE: 18 BRPM | SYSTOLIC BLOOD PRESSURE: 120 MMHG | DIASTOLIC BLOOD PRESSURE: 74 MMHG | HEIGHT: 66 IN

## 2020-03-09 DIAGNOSIS — J01.40 ACUTE NON-RECURRENT PANSINUSITIS: Primary | ICD-10-CM

## 2020-03-09 PROCEDURE — 99213 OFFICE O/P EST LOW 20 MIN: CPT | Performed by: PHYSICIAN ASSISTANT

## 2020-03-09 RX ORDER — CIPROFLOXACIN 500 MG/1
500 TABLET, FILM COATED ORAL 2 TIMES DAILY
Qty: 20 TABLET | Refills: 0 | Status: SHIPPED | OUTPATIENT
Start: 2020-03-09 | End: 2020-03-19

## 2020-03-09 NOTE — PATIENT INSTRUCTIONS
Please follow up with your PCP if no improvement within 5-7 days. Go directly to the ER for any acute worsening of symptoms. Home Care    · Take antibitoic as prescribed.     · Flonase or Nasacort OTC for nasal symptoms as instructed  · Follow up with you susceptible to sunburn. Please wear sunscreen and apply often; also wear a hat.    · Female only: If taking birth control - Use back up birth control for pregnancy prevention for up to 1 month due to antibiotic use    Probiotics or yogurt daily during anti

## 2020-03-09 NOTE — PROGRESS NOTES
CHIEF COMPLAINT:   Patient presents with:  Sinus Problem: HA x 3 days, sinus pressure/congestion, no ST, no cough, no fever or chills     HPI:   Giacomo Bonds is a 60year old female who presents for sinus congestion for 3-4 days.  Symptoms have been worse continuous. 1 Device 0   • Continuous Blood Gluc Sensor (FREESTYLE AMANDA 14 DAY SENSOR) Does not apply Misc 1 each by Does not apply route every 14 (fourteen) days.  2 each 11   • Continuous Blood Gluc  (FREESTYLE AMANDA READER) Does not apply Device Relation Age of Onset   • High Blood Pressure Father    • Diabetes Father    • High Blood Pressure Mother    • Eye Problems Mother    • Breast Cancer Mother 77        72s   • Breast Cancer Maternal Grandmother 79        dx age 76s      Social History    To best.   Meds and instructions as below. Use, dose, and possible side effects of medication discussed. Comfort care instructions as listed in Patient Instructions. To f/u with PCP if no improvement in 1 week or sooner if sx worsen.   Verbalizes [employer]  compresses often help relieve pressure  · Drink plenty of water, hot tea, and other liquids. This may help thin nasal mucus. It also may help your sinuses drain fluids. If you were prescribed an antibiotic:   · Complete the entire prescription.   · If yo

## 2020-04-04 DIAGNOSIS — E11.22 UNCONTROLLED TYPE 2 DIABETES MELLITUS WITH STAGE 2 CHRONIC KIDNEY DISEASE, WITHOUT LONG-TERM CURRENT USE OF INSULIN (HCC): ICD-10-CM

## 2020-04-04 DIAGNOSIS — E11.69 HYPERLIPIDEMIA ASSOCIATED WITH TYPE 2 DIABETES MELLITUS (HCC): ICD-10-CM

## 2020-04-04 DIAGNOSIS — E11.65 UNCONTROLLED TYPE 2 DIABETES MELLITUS WITH STAGE 2 CHRONIC KIDNEY DISEASE, WITHOUT LONG-TERM CURRENT USE OF INSULIN (HCC): ICD-10-CM

## 2020-04-04 DIAGNOSIS — E78.5 HYPERLIPIDEMIA ASSOCIATED WITH TYPE 2 DIABETES MELLITUS (HCC): ICD-10-CM

## 2020-04-04 DIAGNOSIS — I10 ESSENTIAL HYPERTENSION, BENIGN: ICD-10-CM

## 2020-04-04 DIAGNOSIS — N18.2 UNCONTROLLED TYPE 2 DIABETES MELLITUS WITH STAGE 2 CHRONIC KIDNEY DISEASE, WITHOUT LONG-TERM CURRENT USE OF INSULIN (HCC): ICD-10-CM

## 2020-04-05 RX ORDER — LOSARTAN POTASSIUM AND HYDROCHLOROTHIAZIDE 12.5; 5 MG/1; MG/1
TABLET ORAL
Qty: 90 TABLET | Refills: 0 | Status: SHIPPED | OUTPATIENT
Start: 2020-04-05 | End: 2020-07-13

## 2020-04-05 RX ORDER — OMEPRAZOLE 40 MG/1
CAPSULE, DELAYED RELEASE ORAL
Qty: 90 CAPSULE | Refills: 0 | Status: SHIPPED | OUTPATIENT
Start: 2020-04-05 | End: 2020-06-29

## 2020-04-05 RX ORDER — BUPROPION HYDROCHLORIDE 75 MG/1
TABLET ORAL
Qty: 180 TABLET | Refills: 0 | Status: SHIPPED | OUTPATIENT
Start: 2020-04-05 | End: 2020-08-31

## 2020-04-05 NOTE — TELEPHONE ENCOUNTER
Last Office Visit: 9/30/19  Last Refill:  Losartan and Omeprazole: 1/6/20  Bupropion: 11/13/19  Last Labs: 9/30/19

## 2020-05-08 DIAGNOSIS — E11.65 UNCONTROLLED TYPE 2 DIABETES MELLITUS WITH STAGE 2 CHRONIC KIDNEY DISEASE, WITHOUT LONG-TERM CURRENT USE OF INSULIN (HCC): ICD-10-CM

## 2020-05-08 DIAGNOSIS — N18.2 UNCONTROLLED TYPE 2 DIABETES MELLITUS WITH STAGE 2 CHRONIC KIDNEY DISEASE, WITHOUT LONG-TERM CURRENT USE OF INSULIN (HCC): ICD-10-CM

## 2020-05-08 DIAGNOSIS — E11.22 UNCONTROLLED TYPE 2 DIABETES MELLITUS WITH STAGE 2 CHRONIC KIDNEY DISEASE, WITHOUT LONG-TERM CURRENT USE OF INSULIN (HCC): ICD-10-CM

## 2020-05-08 DIAGNOSIS — I10 ESSENTIAL HYPERTENSION, BENIGN: ICD-10-CM

## 2020-05-08 DIAGNOSIS — E11.69 HYPERLIPIDEMIA ASSOCIATED WITH TYPE 2 DIABETES MELLITUS (HCC): ICD-10-CM

## 2020-05-08 DIAGNOSIS — E78.5 HYPERLIPIDEMIA ASSOCIATED WITH TYPE 2 DIABETES MELLITUS (HCC): ICD-10-CM

## 2020-05-08 NOTE — TELEPHONE ENCOUNTER
Metformin   Last refill: 02/14/20  Qty: 90  W/ 0 refills  Last ov: 09/30/19      Diabetic Medication Protocol Failed5/8 2:00 PM   HgBA1C procedure resulted in past 6 months    Last HgBA1C < 7.5    Microalbumin procedure in past 12 months or taking ACE/ARB

## 2020-05-11 RX ORDER — ATORVASTATIN CALCIUM 40 MG/1
TABLET, FILM COATED ORAL
Qty: 90 TABLET | Refills: 0 | Status: SHIPPED | OUTPATIENT
Start: 2020-05-11 | End: 2020-07-27

## 2020-05-11 RX ORDER — METFORMIN HYDROCHLORIDE 750 MG/1
TABLET, EXTENDED RELEASE ORAL
Qty: 90 TABLET | Refills: 0 | Status: SHIPPED | OUTPATIENT
Start: 2020-05-11 | End: 2020-08-21

## 2020-05-11 NOTE — TELEPHONE ENCOUNTER
You need an A1C, I know she will be resistant to coming in for a face to face visit, we can do a hybrid, video, drive by finger stick if she would like but I need to know what her control is,  NOT GOOD in the fall. I need a weight and blood pressure.

## 2020-05-12 NOTE — TELEPHONE ENCOUNTER
Patient advised. She said she thinks she may have had Covid in early March and she went to the walk in clinic but this was before testing was done. She said she has been eating horribly.  Virtual visit scheduled tomorrow with Dr Veronica Chase at 2pm, patient is maegan

## 2020-05-13 ENCOUNTER — TELEMEDICINE (OUTPATIENT)
Dept: FAMILY MEDICINE CLINIC | Facility: CLINIC | Age: 60
End: 2020-05-13
Payer: COMMERCIAL

## 2020-05-13 DIAGNOSIS — N18.2 UNCONTROLLED TYPE 2 DIABETES MELLITUS WITH STAGE 2 CHRONIC KIDNEY DISEASE, WITHOUT LONG-TERM CURRENT USE OF INSULIN (HCC): Primary | ICD-10-CM

## 2020-05-13 DIAGNOSIS — E11.22 UNCONTROLLED TYPE 2 DIABETES MELLITUS WITH STAGE 2 CHRONIC KIDNEY DISEASE, WITHOUT LONG-TERM CURRENT USE OF INSULIN (HCC): Primary | ICD-10-CM

## 2020-05-13 DIAGNOSIS — I10 ESSENTIAL HYPERTENSION, BENIGN: ICD-10-CM

## 2020-05-13 DIAGNOSIS — E11.9 DIABETES MELLITUS TYPE 2, NONINSULIN DEPENDENT (HCC): ICD-10-CM

## 2020-05-13 DIAGNOSIS — E11.65 UNCONTROLLED TYPE 2 DIABETES MELLITUS WITH STAGE 2 CHRONIC KIDNEY DISEASE, WITHOUT LONG-TERM CURRENT USE OF INSULIN (HCC): Primary | ICD-10-CM

## 2020-05-13 PROCEDURE — 99214 OFFICE O/P EST MOD 30 MIN: CPT | Performed by: INTERNAL MEDICINE

## 2020-05-13 RX ORDER — PEN NEEDLE, DIABETIC 30 GX3/16"
1 NEEDLE, DISPOSABLE MISCELLANEOUS
Qty: 30 EACH | Refills: 1 | Status: SHIPPED | OUTPATIENT
Start: 2020-05-13

## 2020-05-13 NOTE — PROGRESS NOTES
HPI:   Shantelle Shultz is a 60year old female who presents for recheck of her diabetes. Patient’s FBS have been 120-190. Last visit with ophthalmologist was last year. Pt has been checking her feet on a regular basis. Pt denies any tingling of the feet.  P BY MOUTH DAILY WITH BREAKFAST 90 tablet 0   • ATORVASTATIN 40 MG Oral Tab TAKE 1 TABLET(40 MG) BY MOUTH EVERY NIGHT 90 tablet 0   • OMEPRAZOLE 40 MG Oral Capsule Delayed Release TAKE 1 CAPSULE(40 MG) BY MOUTH DAILY 90 capsule 0   • BUPROPION HCL 75 MG Oral hemorrhage) 12/15/2008   • Essential hypertension, benign 11/5/2007   • High blood pressure    • High cholesterol    • Obesity, unspecified 11/5/2007   • PONV (postoperative nausea and vomiting)    • Proteinuria 3/11/2011   • Type II or unspecified type di check fasting lipids and CMP, lose wgt with carbohydrate controlled diet and exercise, refer to Ophthamology, check feet daily. The patient indicates understanding of these issues and agrees to the plan. The patient is asked to return in 3 m.

## 2020-06-29 ENCOUNTER — TELEPHONE (OUTPATIENT)
Dept: FAMILY MEDICINE CLINIC | Facility: CLINIC | Age: 60
End: 2020-06-29

## 2020-06-29 ENCOUNTER — OFFICE VISIT (OUTPATIENT)
Dept: FAMILY MEDICINE CLINIC | Facility: CLINIC | Age: 60
End: 2020-06-29
Payer: COMMERCIAL

## 2020-06-29 VITALS
WEIGHT: 191.25 LBS | TEMPERATURE: 98 F | DIASTOLIC BLOOD PRESSURE: 78 MMHG | BODY MASS INDEX: 30.74 KG/M2 | OXYGEN SATURATION: 96 % | HEART RATE: 86 BPM | SYSTOLIC BLOOD PRESSURE: 126 MMHG | HEIGHT: 66 IN | RESPIRATION RATE: 18 BRPM

## 2020-06-29 DIAGNOSIS — I10 ESSENTIAL HYPERTENSION, BENIGN: ICD-10-CM

## 2020-06-29 DIAGNOSIS — J01.00 ACUTE NON-RECURRENT MAXILLARY SINUSITIS: Primary | ICD-10-CM

## 2020-06-29 DIAGNOSIS — E11.65 UNCONTROLLED TYPE 2 DIABETES MELLITUS WITH STAGE 2 CHRONIC KIDNEY DISEASE, WITHOUT LONG-TERM CURRENT USE OF INSULIN (HCC): ICD-10-CM

## 2020-06-29 DIAGNOSIS — N18.2 UNCONTROLLED TYPE 2 DIABETES MELLITUS WITH STAGE 2 CHRONIC KIDNEY DISEASE, WITHOUT LONG-TERM CURRENT USE OF INSULIN (HCC): ICD-10-CM

## 2020-06-29 DIAGNOSIS — E11.22 UNCONTROLLED TYPE 2 DIABETES MELLITUS WITH STAGE 2 CHRONIC KIDNEY DISEASE, WITHOUT LONG-TERM CURRENT USE OF INSULIN (HCC): ICD-10-CM

## 2020-06-29 PROCEDURE — 80053 COMPREHEN METABOLIC PANEL: CPT | Performed by: INTERNAL MEDICINE

## 2020-06-29 PROCEDURE — 80061 LIPID PANEL: CPT | Performed by: INTERNAL MEDICINE

## 2020-06-29 PROCEDURE — 83036 HEMOGLOBIN GLYCOSYLATED A1C: CPT | Performed by: INTERNAL MEDICINE

## 2020-06-29 PROCEDURE — 99213 OFFICE O/P EST LOW 20 MIN: CPT | Performed by: NURSE PRACTITIONER

## 2020-06-29 PROCEDURE — 85025 COMPLETE CBC W/AUTO DIFF WBC: CPT | Performed by: INTERNAL MEDICINE

## 2020-06-29 RX ORDER — SEMAGLUTIDE 1.34 MG/ML
INJECTION, SOLUTION SUBCUTANEOUS
Qty: 3 ML | Refills: 0 | Status: SHIPPED | OUTPATIENT
Start: 2020-06-29 | End: 2020-07-27

## 2020-06-29 RX ORDER — OMEPRAZOLE 40 MG/1
CAPSULE, DELAYED RELEASE ORAL
Qty: 90 CAPSULE | Refills: 0 | Status: SHIPPED | OUTPATIENT
Start: 2020-06-29 | End: 2020-10-07

## 2020-06-29 RX ORDER — DOXYCYCLINE HYCLATE 100 MG
100 TABLET ORAL 2 TIMES DAILY
Qty: 14 TABLET | Refills: 0 | Status: SHIPPED | OUTPATIENT
Start: 2020-06-29 | End: 2020-07-06

## 2020-06-29 NOTE — TELEPHONE ENCOUNTER
LOV    LAST LAB    LAST RX    Next OV   Future Appointments   Date Time Provider Rolan Miles   6/29/2020  2:15 PM ADOLPH Kendall [facility] [place]         PROTOCOL  NONE

## 2020-06-29 NOTE — TELEPHONE ENCOUNTER
Quentin Rosenbaum are you ok with seeing this patient in the office today? She denies fever, body aches. Did have some diarrhea when first starting Ozempic in December but this has resolved.

## 2020-06-29 NOTE — PROGRESS NOTES
HPI:   Sinusitis   This is a recurrent problem. Episode onset: Friday. The problem has been gradually worsening since onset. There has been no fever.  Associated symptoms include congestion, ear pain (pressure), headaches, sinus pressure and sneezing (lik apply Device 1 Device by Does not apply route continuous. 1 Device 0   • Continuous Blood Gluc Sensor ([facility]) Does not apply Misc 3 each by Does not apply route every 30 (thirty) days.  3 each 11   • Multiple Vitamins-Minerals (CENTR age 76s      Social History    Tobacco Use      Smoking status: Never Smoker      Smokeless tobacco: Never Used    Alcohol use: No      Alcohol/week: 0.0 standard drinks    Drug use: No        REVIEW OF SYSTEMS:   Review of Systems   Constitutional: Ismael De Leon DIFFERENTIAL WITH PLATELET; Future    Essential hypertension, benign  -     CBC WITH DIFFERENTIAL WITH PLATELET; Future    Unable to obtain fingerstick A1C in office today, states hemoglobin is too low.  Dr. Bigg Metz had placed orders at her last telemed visit

## 2020-06-29 NOTE — TELEPHONE ENCOUNTER
Patient states that she thinks she may have a sinus infection since last week. Head has been killing her since Friday. Sxs: ear clogged, head is stuffy, inside of nose is swollen, no fever. OTC: nose spray and advil cold & sinus.  Offered a video visit for

## 2020-06-29 NOTE — TELEPHONE ENCOUNTER
Patient said that she started with a sinus headache on Friday with some dizziness. She said her ears feel \"plugged\" and she has had a \"pounding headache\" and eyes are \"sticky\" when she wakes up in the morning.  She said that only place she has been is

## 2020-06-29 NOTE — TELEPHONE ENCOUNTER
Last refill: 5/13/20 #2 w/ 1 refills  Last oV: 5/13/20 (telemed)  Last labs: Today (6/29/20)    No future appointments.

## 2020-07-01 ENCOUNTER — TELEPHONE (OUTPATIENT)
Dept: FAMILY MEDICINE CLINIC | Facility: CLINIC | Age: 60
End: 2020-07-01

## 2020-07-01 NOTE — TELEPHONE ENCOUNTER
OMEPRAZOLE REQUIRES PRIOR AUTHORIZATION. DID WE RECEIVE THAT PRIOR AUTH REQUEST?  IF SO HAS IT BEEN AUTHORIZED YET WITH HER INSURANCE?    USES [facility] [address]

## 2020-07-02 PROBLEM — K21.9 GERD (GASTROESOPHAGEAL REFLUX DISEASE): Status: ACTIVE | Noted: 2020-07-02

## 2020-07-06 ENCOUNTER — OFFICE VISIT (OUTPATIENT)
Dept: FAMILY MEDICINE CLINIC | Facility: CLINIC | Age: 60
End: 2020-07-06
Payer: COMMERCIAL

## 2020-07-06 VITALS
TEMPERATURE: 97 F | BODY MASS INDEX: 30 KG/M2 | HEART RATE: 108 BPM | WEIGHT: 188 LBS | SYSTOLIC BLOOD PRESSURE: 136 MMHG | DIASTOLIC BLOOD PRESSURE: 80 MMHG | OXYGEN SATURATION: 97 %

## 2020-07-06 DIAGNOSIS — E11.65 UNCONTROLLED TYPE 2 DIABETES MELLITUS WITH STAGE 2 CHRONIC KIDNEY DISEASE, WITHOUT LONG-TERM CURRENT USE OF INSULIN (HCC): Primary | ICD-10-CM

## 2020-07-06 DIAGNOSIS — N18.2 UNCONTROLLED TYPE 2 DIABETES MELLITUS WITH STAGE 2 CHRONIC KIDNEY DISEASE, WITHOUT LONG-TERM CURRENT USE OF INSULIN (HCC): Primary | ICD-10-CM

## 2020-07-06 DIAGNOSIS — E11.22 UNCONTROLLED TYPE 2 DIABETES MELLITUS WITH STAGE 2 CHRONIC KIDNEY DISEASE, WITHOUT LONG-TERM CURRENT USE OF INSULIN (HCC): Primary | ICD-10-CM

## 2020-07-06 PROCEDURE — 99214 OFFICE O/P EST MOD 30 MIN: CPT | Performed by: INTERNAL MEDICINE

## 2020-07-06 RX ORDER — CLINDAMYCIN HYDROCHLORIDE 300 MG/1
300 CAPSULE ORAL 3 TIMES DAILY
Qty: 21 CAPSULE | Refills: 0 | Status: SHIPPED | OUTPATIENT
Start: 2020-07-06 | End: 2020-07-13

## 2020-07-06 NOTE — PROGRESS NOTES
HPI:   Wendy Brennan is a 60year old female who presents for recheck of her diabetes. Patient’s FBS have been 140-150. Last visit with ophthalmologist was last year. Pt has been checking her feet on a regular basis. Pt denies any tingling of the feet.  P tablet 0   • Insulin Pen Needle (PEN NEEDLES) 32G X 4 MM Does not apply Misc 1 each by Does not apply route every 7 days.  30 each 1   • METFORMIN HCL  MG Oral Tablet 24 Hr TAKE 1 TABLET BY MOUTH DAILY WITH BREAKFAST 90 tablet 0   • ATORVASTATIN 40 MG HAND/FINGER SURGERY UNLISTED Left 02/2018   • HYSTERECTOMY  1994    LAV for endometriosis (Ovaries left intact)   • KNEE ARTHROSCOPY Left 8/27/2019    Performed by Babatunde Harris MD at [facility] MAIN OR   • KNEE SURGERY Left 04/19/2019   • LAPAROSCOPIC COLON a recheck of her diabetes. Diabetic control is better, on ozempic.   Recommendations are: continue present meds, check HgbA1C, check fasting lipids and CMP in 3 months, lose wgt with carbohydrate controlled diet and exercise, refer to Ophthamology, check fe

## 2020-07-12 DIAGNOSIS — I10 ESSENTIAL HYPERTENSION, BENIGN: ICD-10-CM

## 2020-07-12 DIAGNOSIS — E11.69 HYPERLIPIDEMIA ASSOCIATED WITH TYPE 2 DIABETES MELLITUS (HCC): ICD-10-CM

## 2020-07-12 DIAGNOSIS — E11.22 UNCONTROLLED TYPE 2 DIABETES MELLITUS WITH STAGE 2 CHRONIC KIDNEY DISEASE, WITHOUT LONG-TERM CURRENT USE OF INSULIN (HCC): ICD-10-CM

## 2020-07-12 DIAGNOSIS — E11.65 UNCONTROLLED TYPE 2 DIABETES MELLITUS WITH STAGE 2 CHRONIC KIDNEY DISEASE, WITHOUT LONG-TERM CURRENT USE OF INSULIN (HCC): ICD-10-CM

## 2020-07-12 DIAGNOSIS — N18.2 UNCONTROLLED TYPE 2 DIABETES MELLITUS WITH STAGE 2 CHRONIC KIDNEY DISEASE, WITHOUT LONG-TERM CURRENT USE OF INSULIN (HCC): ICD-10-CM

## 2020-07-12 DIAGNOSIS — E78.5 HYPERLIPIDEMIA ASSOCIATED WITH TYPE 2 DIABETES MELLITUS (HCC): ICD-10-CM

## 2020-07-13 RX ORDER — LOSARTAN POTASSIUM AND HYDROCHLOROTHIAZIDE 12.5; 5 MG/1; MG/1
TABLET ORAL
Qty: 90 TABLET | Refills: 0 | Status: SHIPPED | OUTPATIENT
Start: 2020-07-13 | End: 2020-10-07

## 2020-07-13 NOTE — TELEPHONE ENCOUNTER
Last refill: 04/05/20  Qty: 90  W/ 0 refills  Last ov: 07/06/20    Requested Prescriptions     Pending Prescriptions Disp Refills   • LOSARTAN POTASSIUM-HCTZ 50-12.5 MG Oral Tab [Pharmacy Med Name: LOSARTAN/HCTZ 50/12.5MG TABLETS] 90 tablet 0     Sig: TAKE

## 2020-07-27 RX ORDER — ATORVASTATIN CALCIUM 40 MG/1
TABLET, FILM COATED ORAL
Qty: 90 TABLET | Refills: 0 | Status: SHIPPED | OUTPATIENT
Start: 2020-07-27 | End: 2020-11-11

## 2020-07-27 RX ORDER — SEMAGLUTIDE 1.34 MG/ML
INJECTION, SOLUTION SUBCUTANEOUS
Qty: 3 ML | Refills: 0 | Status: SHIPPED | OUTPATIENT
Start: 2020-07-27 | End: 2020-08-31

## 2020-07-27 NOTE — TELEPHONE ENCOUNTER
Last refill on ozempic 3mL on 6/29/2020  Last refill on atrovastatin #90 on 5/11/2020  Last office visit on 7/6/2020  Labs current  Refilled per protocol  Future Appointments   Date Time Provider Rolan Miles   8/18/2020  1:00 PM Andria Krause DO [facility]

## 2020-08-06 ENCOUNTER — TELEPHONE (OUTPATIENT)
Dept: FAMILY MEDICINE CLINIC | Facility: CLINIC | Age: 60
End: 2020-08-06

## 2020-08-06 NOTE — TELEPHONE ENCOUNTER
Patient advised that she had one 12/11/13, she is covered through 12/11/23.  BRYAN. Dr Alyssa Johns RN

## 2020-08-06 NOTE — TELEPHONE ENCOUNTER
Patient is wondering how long the TDAP is good for? They are getting a new grandchild and she wants to make sure she is still covered. Please call patient back to advise.

## 2020-08-11 ENCOUNTER — OFFICE VISIT (OUTPATIENT)
Dept: SURGERY | Facility: CLINIC | Age: 60
End: 2020-08-11
Payer: COMMERCIAL

## 2020-08-11 VITALS — TEMPERATURE: 97 F | WEIGHT: 188 LBS | HEIGHT: 66 IN | BODY MASS INDEX: 30.22 KG/M2

## 2020-08-11 DIAGNOSIS — Z01.818 PRE-OP TESTING: Primary | ICD-10-CM

## 2020-08-11 PROCEDURE — 3008F BODY MASS INDEX DOCD: CPT | Performed by: SURGERY

## 2020-08-11 PROCEDURE — 99243 OFF/OP CNSLTJ NEW/EST LOW 30: CPT | Performed by: SURGERY

## 2020-08-11 NOTE — H&P
Prakash Baez is a 63year old female  Patient presents with:  Cyst: Est patient referred by Dr. Arthur Manuel for cyst on lower back. c/o burning with bending. REFERRED BY    Patient presents with mass on back.   Patient states this has been present since mL, Rfl: 0  ATORVASTATIN 40 MG Oral Tab, TAKE 1 TABLET(40 MG) BY MOUTH EVERY NIGHT, Disp: 90 tablet, Rfl: 0  LOSARTAN POTASSIUM-HCTZ 50-12.5 MG Oral Tab, TAKE 1 TABLET BY MOUTH EVERY DAY, Disp: 90 tablet, Rfl: 0  OMEPRAZOLE 40 MG Oral Capsule Delayed Relea Alcohol/week: 0.0 standard drinks    Drug use: No      ROS     GENERAL HEALTH: otherwise feels well, no weight loss, no fever or chills  SKIN: denies any unusual skin rashes or jaundice  HEENT: denies nasal congestion, sinus pain or sore throat; hearing lo mmol/L Final   • Potassium 06/29/2020 4.2  3.5 - 5.1 mmol/L Final   • Chloride 06/29/2020 102  98 - 112 mmol/L Final   • CO2 06/29/2020 29.0  21.0 - 32.0 mmol/L Final   • Anion Gap 06/29/2020 6  0 - 18 mmol/L Final   • BUN 06/29/2020 13  7 - 18 mg/dL Final Very high >=190 mg/dL       • FASTING 06/29/2020 No   Final   • HgbA1C 06/29/2020 9.0* <5.7 % Final     Normal HbA1C:     <5.7%      Pre-Diabetic:     5.7 - 6.4%      Diabetic:         >6.4%      Diabetic Control: <7.0%       • Estimated Average Glucose for this Visit:  Requested Prescriptions      No prescriptions requested or ordered in this encounter       Imaging & Consults:  None

## 2020-08-21 DIAGNOSIS — I10 ESSENTIAL HYPERTENSION, BENIGN: ICD-10-CM

## 2020-08-21 DIAGNOSIS — E11.65 UNCONTROLLED TYPE 2 DIABETES MELLITUS WITH STAGE 2 CHRONIC KIDNEY DISEASE, WITHOUT LONG-TERM CURRENT USE OF INSULIN (HCC): ICD-10-CM

## 2020-08-21 DIAGNOSIS — E11.69 HYPERLIPIDEMIA ASSOCIATED WITH TYPE 2 DIABETES MELLITUS (HCC): ICD-10-CM

## 2020-08-21 DIAGNOSIS — N18.2 UNCONTROLLED TYPE 2 DIABETES MELLITUS WITH STAGE 2 CHRONIC KIDNEY DISEASE, WITHOUT LONG-TERM CURRENT USE OF INSULIN (HCC): ICD-10-CM

## 2020-08-21 DIAGNOSIS — E11.22 UNCONTROLLED TYPE 2 DIABETES MELLITUS WITH STAGE 2 CHRONIC KIDNEY DISEASE, WITHOUT LONG-TERM CURRENT USE OF INSULIN (HCC): ICD-10-CM

## 2020-08-21 DIAGNOSIS — E78.5 HYPERLIPIDEMIA ASSOCIATED WITH TYPE 2 DIABETES MELLITUS (HCC): ICD-10-CM

## 2020-08-21 RX ORDER — METFORMIN HYDROCHLORIDE 750 MG/1
TABLET, EXTENDED RELEASE ORAL
Qty: 90 TABLET | Refills: 0 | Status: SHIPPED | OUTPATIENT
Start: 2020-08-21 | End: 2020-11-20

## 2020-08-25 ENCOUNTER — TELEPHONE (OUTPATIENT)
Dept: SURGERY | Facility: CLINIC | Age: 60
End: 2020-08-25

## 2020-08-25 DIAGNOSIS — R22.2 MASS ON BACK: Primary | ICD-10-CM

## 2020-08-25 NOTE — TELEPHONE ENCOUNTER
Kelli Curry from [facility] calling states patient needs EKG and CMP for surgery on 9/4/2020  Patient has HX of HTN  Patient states she wants to go to PCP office to complete but needs us to place orders. Orders placed. Patient will contact PCP office to schedule.

## 2020-08-26 ENCOUNTER — OFFICE VISIT (OUTPATIENT)
Dept: FAMILY MEDICINE CLINIC | Facility: CLINIC | Age: 60
End: 2020-08-26
Payer: COMMERCIAL

## 2020-08-26 ENCOUNTER — LABORATORY ENCOUNTER (OUTPATIENT)
Dept: LAB | Age: 60
End: 2020-08-26
Attending: INTERNAL MEDICINE
Payer: COMMERCIAL

## 2020-08-26 VITALS
OXYGEN SATURATION: 97 % | DIASTOLIC BLOOD PRESSURE: 80 MMHG | BODY MASS INDEX: 30 KG/M2 | WEIGHT: 186 LBS | HEART RATE: 98 BPM | TEMPERATURE: 98 F | SYSTOLIC BLOOD PRESSURE: 120 MMHG

## 2020-08-26 DIAGNOSIS — E78.5 HYPERLIPIDEMIA ASSOCIATED WITH TYPE 2 DIABETES MELLITUS (HCC): ICD-10-CM

## 2020-08-26 DIAGNOSIS — D17.1 LIPOMA OF BACK: Primary | ICD-10-CM

## 2020-08-26 DIAGNOSIS — I10 ESSENTIAL HYPERTENSION, BENIGN: ICD-10-CM

## 2020-08-26 DIAGNOSIS — E11.22 UNCONTROLLED TYPE 2 DIABETES MELLITUS WITH STAGE 2 CHRONIC KIDNEY DISEASE, WITHOUT LONG-TERM CURRENT USE OF INSULIN (HCC): ICD-10-CM

## 2020-08-26 DIAGNOSIS — N18.2 UNCONTROLLED TYPE 2 DIABETES MELLITUS WITH STAGE 2 CHRONIC KIDNEY DISEASE, WITHOUT LONG-TERM CURRENT USE OF INSULIN (HCC): ICD-10-CM

## 2020-08-26 DIAGNOSIS — E11.65 UNCONTROLLED TYPE 2 DIABETES MELLITUS WITH STAGE 2 CHRONIC KIDNEY DISEASE, WITHOUT LONG-TERM CURRENT USE OF INSULIN (HCC): ICD-10-CM

## 2020-08-26 DIAGNOSIS — R22.2 MASS ON BACK: ICD-10-CM

## 2020-08-26 DIAGNOSIS — E11.69 HYPERLIPIDEMIA ASSOCIATED WITH TYPE 2 DIABETES MELLITUS (HCC): ICD-10-CM

## 2020-08-26 LAB
ALBUMIN SERPL-MCNC: 4.1 G/DL (ref 3.4–5)
ALBUMIN/GLOB SERPL: 1.1 {RATIO} (ref 1–2)
ALP LIVER SERPL-CCNC: 127 U/L (ref 50–130)
ALT SERPL-CCNC: 39 U/L (ref 13–56)
ANION GAP SERPL CALC-SCNC: 8 MMOL/L (ref 0–18)
AST SERPL-CCNC: 18 U/L (ref 15–37)
BILIRUB SERPL-MCNC: 0.6 MG/DL (ref 0.1–2)
BUN BLD-MCNC: 14 MG/DL (ref 7–18)
BUN/CREAT SERPL: 20 (ref 10–20)
CALCIUM BLD-MCNC: 10.4 MG/DL (ref 8.5–10.1)
CHLORIDE SERPL-SCNC: 100 MMOL/L (ref 98–112)
CO2 SERPL-SCNC: 27 MMOL/L (ref 21–32)
CREAT BLD-MCNC: 0.7 MG/DL (ref 0.55–1.02)
GLOBULIN PLAS-MCNC: 3.7 G/DL (ref 2.8–4.4)
GLUCOSE BLD-MCNC: 163 MG/DL (ref 70–99)
M PROTEIN MFR SERPL ELPH: 7.8 G/DL (ref 6.4–8.2)
OSMOLALITY SERPL CALC.SUM OF ELEC: 284 MOSM/KG (ref 275–295)
PATIENT FASTING Y/N/NP: NO
POTASSIUM SERPL-SCNC: 3.9 MMOL/L (ref 3.5–5.1)
SODIUM SERPL-SCNC: 135 MMOL/L (ref 136–145)

## 2020-08-26 PROCEDURE — 36415 COLL VENOUS BLD VENIPUNCTURE: CPT

## 2020-08-26 PROCEDURE — 3074F SYST BP LT 130 MM HG: CPT | Performed by: INTERNAL MEDICINE

## 2020-08-26 PROCEDURE — 3079F DIAST BP 80-89 MM HG: CPT | Performed by: INTERNAL MEDICINE

## 2020-08-26 PROCEDURE — 93000 ELECTROCARDIOGRAM COMPLETE: CPT | Performed by: INTERNAL MEDICINE

## 2020-08-26 PROCEDURE — 80053 COMPREHEN METABOLIC PANEL: CPT

## 2020-08-26 PROCEDURE — 99214 OFFICE O/P EST MOD 30 MIN: CPT | Performed by: INTERNAL MEDICINE

## 2020-08-26 NOTE — PROGRESS NOTES
Meeta Santana is a 63year old female who presents for a pre-operative physical exam. Patient is to have lipoma removal, to be done by Dr. Joaquim Reyna at [facility] on 9/4/2020. HPI:   Pt complains of skin lesion on the left mid back.     Current Outpatient History:   Diagnosis Date   • Allergic rhinitis due to pollen 5/7/2012   • Diverticulosis of colon (without mention of hemorrhage) 12/15/2008   • Essential hypertension, benign 11/5/2007   • High blood pressure    • High cholesterol    • Obesity, unspecifi lesions  EYES:denies blurred vision or double vision  HEENT: denies nasal congestion, sinus pain or ST  LUNGS: denies shortness of breath with exertion  CARDIOVASCULAR: denies chest pain on exertion  GI: denies abdominal pain,denies heartburn  : denies d

## 2020-08-31 RX ORDER — SEMAGLUTIDE 1.34 MG/ML
INJECTION, SOLUTION SUBCUTANEOUS
Qty: 3 ML | Refills: 0 | Status: SHIPPED | OUTPATIENT
Start: 2020-08-31 | End: 2020-09-22

## 2020-08-31 RX ORDER — BUPROPION HYDROCHLORIDE 75 MG/1
TABLET ORAL
Qty: 180 TABLET | Refills: 0 | Status: SHIPPED | OUTPATIENT
Start: 2020-08-31 | End: 2021-02-08

## 2020-08-31 NOTE — TELEPHONE ENCOUNTER
Last OV w/Dr Maria Ines Marques  8/26/2pre-op  Last Hgba1c 6/29/20  Last refill 7/27/20  Refilled per protocol

## 2020-08-31 NOTE — TELEPHONE ENCOUNTER
Last refill #180 on 4/5/2020  Last office visit on 8/26/2020  Future Appointments   Date Time Provider Rolan Miles   9/1/2020 11:15 AM [facility]

## 2020-09-01 ENCOUNTER — APPOINTMENT (OUTPATIENT)
Dept: LAB | Age: 60
End: 2020-09-01
Attending: SURGERY
Payer: COMMERCIAL

## 2020-09-01 DIAGNOSIS — Z01.818 PRE-OP TESTING: ICD-10-CM

## 2020-09-03 ENCOUNTER — TELEPHONE (OUTPATIENT)
Dept: SURGERY | Facility: CLINIC | Age: 60
End: 2020-09-03

## 2020-09-03 LAB — SARS-COV-2 RNA RESP QL NAA+PROBE: NOT DETECTED

## 2020-09-04 ENCOUNTER — LAB REQUISITION (OUTPATIENT)
Dept: LAB | Facility: HOSPITAL | Age: 60
End: 2020-09-04
Payer: COMMERCIAL

## 2020-09-04 DIAGNOSIS — R22.2 LOCALIZED SWELLING, MASS AND LUMP, TRUNK: ICD-10-CM

## 2020-09-04 PROCEDURE — 88304 TISSUE EXAM BY PATHOLOGIST: CPT | Performed by: SURGERY

## 2020-09-10 NOTE — PROGRESS NOTES
Pt. Was informed her path from her recent surgery came back benign. I confirmed Pt.'s upcoming apt w/ RWM. No further questions or concerns at this time.

## 2020-09-11 ENCOUNTER — MED REC SCAN ONLY (OUTPATIENT)
Dept: SURGERY | Facility: CLINIC | Age: 60
End: 2020-09-11

## 2020-09-15 ENCOUNTER — NURSE ONLY (OUTPATIENT)
Dept: FAMILY MEDICINE CLINIC | Facility: CLINIC | Age: 60
End: 2020-09-15
Payer: COMMERCIAL

## 2020-09-15 ENCOUNTER — OFFICE VISIT (OUTPATIENT)
Dept: SURGERY | Facility: CLINIC | Age: 60
End: 2020-09-15
Payer: COMMERCIAL

## 2020-09-15 VITALS — BODY MASS INDEX: 29.89 KG/M2 | TEMPERATURE: 97 F | HEIGHT: 66 IN | WEIGHT: 186 LBS

## 2020-09-15 DIAGNOSIS — Z23 NEED FOR INFLUENZA VACCINATION: Primary | ICD-10-CM

## 2020-09-15 DIAGNOSIS — H66.90 EAR INFECTION: Primary | ICD-10-CM

## 2020-09-15 PROCEDURE — 99214 OFFICE O/P EST MOD 30 MIN: CPT | Performed by: SURGERY

## 2020-09-15 PROCEDURE — 90686 IIV4 VACC NO PRSV 0.5 ML IM: CPT | Performed by: INTERNAL MEDICINE

## 2020-09-15 PROCEDURE — 90471 IMMUNIZATION ADMIN: CPT | Performed by: INTERNAL MEDICINE

## 2020-09-15 PROCEDURE — 3008F BODY MASS INDEX DOCD: CPT | Performed by: SURGERY

## 2020-09-15 RX ORDER — CLINDAMYCIN HYDROCHLORIDE 150 MG/1
150 CAPSULE ORAL 3 TIMES DAILY
Qty: 21 CAPSULE | Refills: 0 | Status: SHIPPED | OUTPATIENT
Start: 2020-09-15 | End: 2020-09-22

## 2020-09-15 NOTE — PROGRESS NOTES
[facility]  Progress Note    Ashley Brasher Patient Status:  No patient class for patient encounter    1960 MRN [id number]   Location [address] [place] [address] Attending No att. providers found   [facility] Day # 0 PCP Ter Diverticulitis     Abdominal pain     Lumbar paraspinal muscle spasm     Ileus (HCC)     S/P partial resection of colon     History of LAVH     Mass of hand, left     Hyperlipidemia associated with type 2 diabetes mellitus ([address]     GERD (gastroesophagea

## 2020-09-20 DIAGNOSIS — E11.22 UNCONTROLLED TYPE 2 DIABETES MELLITUS WITH STAGE 2 CHRONIC KIDNEY DISEASE, WITHOUT LONG-TERM CURRENT USE OF INSULIN (HCC): Primary | ICD-10-CM

## 2020-09-20 DIAGNOSIS — N18.2 UNCONTROLLED TYPE 2 DIABETES MELLITUS WITH STAGE 2 CHRONIC KIDNEY DISEASE, WITHOUT LONG-TERM CURRENT USE OF INSULIN (HCC): Primary | ICD-10-CM

## 2020-09-20 DIAGNOSIS — E11.65 UNCONTROLLED TYPE 2 DIABETES MELLITUS WITH STAGE 2 CHRONIC KIDNEY DISEASE, WITHOUT LONG-TERM CURRENT USE OF INSULIN (HCC): Primary | ICD-10-CM

## 2020-09-21 ENCOUNTER — TELEPHONE (OUTPATIENT)
Dept: FAMILY MEDICINE CLINIC | Facility: CLINIC | Age: 60
End: 2020-09-21

## 2020-09-21 NOTE — TELEPHONE ENCOUNTER
Patient said she had the flu shot. She is coming in Thursday at 3pm to have the [address] [employer] and Shingrix. Please place order for Shingrix and Prevnar. Patient advised to check with insurance to make sure they cover the Shingrix.  She states she will check but w

## 2020-09-22 RX ORDER — SEMAGLUTIDE 1.34 MG/ML
INJECTION, SOLUTION SUBCUTANEOUS
Qty: 3 ML | Refills: 0 | Status: SHIPPED | OUTPATIENT
Start: 2020-09-22 | End: 2020-10-29

## 2020-09-22 NOTE — TELEPHONE ENCOUNTER
Diabetic Medication Protocol Failed09/20/2020 04:54 PM   Last HgBA1C < 7.5 Protocol Details    Microalbumin procedure in past 12 months or taking ACE/ARB     HgBA1C procedure resulted in past 6 months     Appointment in past 6 or next 3 months      Last OV

## 2020-10-06 DIAGNOSIS — E78.5 HYPERLIPIDEMIA ASSOCIATED WITH TYPE 2 DIABETES MELLITUS (HCC): ICD-10-CM

## 2020-10-06 DIAGNOSIS — E11.22 UNCONTROLLED TYPE 2 DIABETES MELLITUS WITH STAGE 2 CHRONIC KIDNEY DISEASE, WITHOUT LONG-TERM CURRENT USE OF INSULIN (HCC): ICD-10-CM

## 2020-10-06 DIAGNOSIS — E11.65 UNCONTROLLED TYPE 2 DIABETES MELLITUS WITH STAGE 2 CHRONIC KIDNEY DISEASE, WITHOUT LONG-TERM CURRENT USE OF INSULIN (HCC): ICD-10-CM

## 2020-10-06 DIAGNOSIS — I10 ESSENTIAL HYPERTENSION, BENIGN: ICD-10-CM

## 2020-10-06 DIAGNOSIS — E11.69 HYPERLIPIDEMIA ASSOCIATED WITH TYPE 2 DIABETES MELLITUS (HCC): ICD-10-CM

## 2020-10-06 DIAGNOSIS — N18.2 UNCONTROLLED TYPE 2 DIABETES MELLITUS WITH STAGE 2 CHRONIC KIDNEY DISEASE, WITHOUT LONG-TERM CURRENT USE OF INSULIN (HCC): ICD-10-CM

## 2020-10-07 RX ORDER — LOSARTAN POTASSIUM AND HYDROCHLOROTHIAZIDE 12.5; 5 MG/1; MG/1
TABLET ORAL
Qty: 90 TABLET | Refills: 0 | Status: SHIPPED | OUTPATIENT
Start: 2020-10-07 | End: 2021-01-05

## 2020-10-07 RX ORDER — OMEPRAZOLE 40 MG/1
CAPSULE, DELAYED RELEASE ORAL
Qty: 90 CAPSULE | Refills: 0 | Status: SHIPPED | OUTPATIENT
Start: 2020-10-07 | End: 2021-01-05

## 2020-10-07 NOTE — TELEPHONE ENCOUNTER
Last office visit t 8/26/2020      Last refill  7/13/2020 l Losartan Potassium # 90 x 0 refill       Last Lab 6/29/2020                      Requested Prescriptions     Name from pharmacy: LOSARTAN/HCTZ 50/12.5MG TABLETS         Will file in chart as: LO

## 2020-10-07 NOTE — TELEPHONE ENCOUNTER
LAST OFFICE VISIT 8/26/2020      LAST REFILL 6/29/2020 OMEPRAZOLE 40MG  # 90 X 0 REFILL       LAST LAB 6/29/2020                             Name from pharmacy: OMEPRAZOLE 40MG CAPSULES         Will file in chart as: OMEPRAZOLE 40 MG Oral Capsule Delayed

## 2020-10-15 ENCOUNTER — TELEPHONE (OUTPATIENT)
Dept: FAMILY MEDICINE CLINIC | Facility: CLINIC | Age: 60
End: 2020-10-15

## 2020-10-15 ENCOUNTER — NURSE ONLY (OUTPATIENT)
Dept: FAMILY MEDICINE CLINIC | Facility: CLINIC | Age: 60
End: 2020-10-15
Payer: COMMERCIAL

## 2020-10-15 DIAGNOSIS — E11.22 UNCONTROLLED TYPE 2 DIABETES MELLITUS WITH STAGE 2 CHRONIC KIDNEY DISEASE, WITHOUT LONG-TERM CURRENT USE OF INSULIN (HCC): Primary | ICD-10-CM

## 2020-10-15 DIAGNOSIS — E11.65 UNCONTROLLED TYPE 2 DIABETES MELLITUS WITH STAGE 2 CHRONIC KIDNEY DISEASE, WITHOUT LONG-TERM CURRENT USE OF INSULIN (HCC): Primary | ICD-10-CM

## 2020-10-15 DIAGNOSIS — N18.2 UNCONTROLLED TYPE 2 DIABETES MELLITUS WITH STAGE 2 CHRONIC KIDNEY DISEASE, WITHOUT LONG-TERM CURRENT USE OF INSULIN (HCC): Primary | ICD-10-CM

## 2020-10-15 PROCEDURE — 90670 PCV13 VACCINE IM: CPT | Performed by: INTERNAL MEDICINE

## 2020-10-15 PROCEDURE — 90471 IMMUNIZATION ADMIN: CPT | Performed by: INTERNAL MEDICINE

## 2020-10-15 RX ORDER — ALPRAZOLAM 0.5 MG/1
0.5 TABLET ORAL NIGHTLY PRN
Qty: 30 TABLET | Refills: 0 | Status: SHIPPED | OUTPATIENT
Start: 2020-10-15 | End: 2020-11-14

## 2020-10-15 NOTE — TELEPHONE ENCOUNTER
Patient said that she has been having a lot of anxiety over the pandemic and has not been able to sleep at night. She said she is gets so anxious she \"shakes\". She said she is worried about her daughter who is pregnant and has not be cautious.  She is ask

## 2020-10-29 RX ORDER — SEMAGLUTIDE 1.34 MG/ML
INJECTION, SOLUTION SUBCUTANEOUS
Qty: 3 ML | Refills: 0 | Status: SHIPPED | OUTPATIENT
Start: 2020-10-29 | End: 2020-11-20

## 2020-10-29 NOTE — TELEPHONE ENCOUNTER
Left detail message stating pt is due for labs and to call our office and schedule an appointment with Damaris     LOV: 8-    LAST LAB: 6-    LAST RX:    Medication Quantity Refills Start End   OZEMPIC, 1 MG/DOSE, 2 MG/1.5ML Subcutaneous Soluti

## 2020-11-11 RX ORDER — ATORVASTATIN CALCIUM 40 MG/1
TABLET, FILM COATED ORAL
Qty: 90 TABLET | Refills: 0 | Status: SHIPPED | OUTPATIENT
Start: 2020-11-11 | End: 2021-02-08

## 2020-11-11 NOTE — TELEPHONE ENCOUNTER
Last refill: 7/27/20 #90 w/ 0 refills    Last OV: 8/26/20  Last labs: 6/29/20    Future Appointments   Date Time Provider Rolan Miles   11/16/2020  2:00 PM [facility] [place] NURSE [facility] [place]

## 2020-11-16 ENCOUNTER — NURSE ONLY (OUTPATIENT)
Dept: FAMILY MEDICINE CLINIC | Facility: CLINIC | Age: 60
End: 2020-11-16
Payer: COMMERCIAL

## 2020-11-16 PROCEDURE — 90471 IMMUNIZATION ADMIN: CPT | Performed by: INTERNAL MEDICINE

## 2020-11-16 PROCEDURE — 90750 HZV VACC RECOMBINANT IM: CPT | Performed by: INTERNAL MEDICINE

## 2020-11-20 DIAGNOSIS — N18.2 UNCONTROLLED TYPE 2 DIABETES MELLITUS WITH STAGE 2 CHRONIC KIDNEY DISEASE, WITHOUT LONG-TERM CURRENT USE OF INSULIN (HCC): ICD-10-CM

## 2020-11-20 DIAGNOSIS — E78.5 HYPERLIPIDEMIA ASSOCIATED WITH TYPE 2 DIABETES MELLITUS (HCC): ICD-10-CM

## 2020-11-20 DIAGNOSIS — E11.65 UNCONTROLLED TYPE 2 DIABETES MELLITUS WITH STAGE 2 CHRONIC KIDNEY DISEASE, WITHOUT LONG-TERM CURRENT USE OF INSULIN (HCC): ICD-10-CM

## 2020-11-20 DIAGNOSIS — E11.69 HYPERLIPIDEMIA ASSOCIATED WITH TYPE 2 DIABETES MELLITUS (HCC): ICD-10-CM

## 2020-11-20 DIAGNOSIS — I10 ESSENTIAL HYPERTENSION, BENIGN: ICD-10-CM

## 2020-11-20 DIAGNOSIS — E11.22 UNCONTROLLED TYPE 2 DIABETES MELLITUS WITH STAGE 2 CHRONIC KIDNEY DISEASE, WITHOUT LONG-TERM CURRENT USE OF INSULIN (HCC): ICD-10-CM

## 2020-11-20 RX ORDER — SEMAGLUTIDE 1.34 MG/ML
INJECTION, SOLUTION SUBCUTANEOUS
Qty: 3 ML | Refills: 0 | Status: SHIPPED | OUTPATIENT
Start: 2020-11-20 | End: 2020-12-21

## 2020-11-20 RX ORDER — METFORMIN HYDROCHLORIDE 750 MG/1
TABLET, EXTENDED RELEASE ORAL
Qty: 90 TABLET | Refills: 0 | Status: SHIPPED | OUTPATIENT
Start: 2020-11-20 | End: 2021-02-26

## 2020-11-20 NOTE — TELEPHONE ENCOUNTER
Metformin: 8/21/20 #90 w/ 0 refills  Ozempic: 10/29/20 #3 w/ 0 refills    Last OV: 8/26/20  Last labs: 6/29/20    No future appointments.         Diabetic Medication Protocol Failed

## 2020-12-21 ENCOUNTER — TELEPHONE (OUTPATIENT)
Dept: FAMILY MEDICINE CLINIC | Facility: CLINIC | Age: 60
End: 2020-12-21

## 2020-12-21 RX ORDER — SEMAGLUTIDE 1.34 MG/ML
1 INJECTION, SOLUTION SUBCUTANEOUS
Qty: 3 ML | Refills: 0 | Status: SHIPPED | OUTPATIENT
Start: 2020-12-21 | End: 2021-01-11

## 2020-12-21 NOTE — TELEPHONE ENCOUNTER
Last OV w/Dr Kathrine Jeronimo 8/26/20  Last Hgba1c 6/29/20  Last RF on Ozempic 11/20/20  No future appointments

## 2021-01-04 RX ORDER — OMEPRAZOLE 40 MG/1
40 CAPSULE, DELAYED RELEASE ORAL DAILY
Qty: 90 CAPSULE | Refills: 0 | OUTPATIENT
Start: 2021-01-04

## 2021-01-05 ENCOUNTER — TELEPHONE (OUTPATIENT)
Dept: FAMILY MEDICINE CLINIC | Facility: CLINIC | Age: 61
End: 2021-01-05

## 2021-01-05 DIAGNOSIS — I10 ESSENTIAL HYPERTENSION, BENIGN: ICD-10-CM

## 2021-01-05 DIAGNOSIS — E78.5 HYPERLIPIDEMIA ASSOCIATED WITH TYPE 2 DIABETES MELLITUS (HCC): ICD-10-CM

## 2021-01-05 DIAGNOSIS — E11.69 HYPERLIPIDEMIA ASSOCIATED WITH TYPE 2 DIABETES MELLITUS (HCC): ICD-10-CM

## 2021-01-05 DIAGNOSIS — E11.22 UNCONTROLLED TYPE 2 DIABETES MELLITUS WITH STAGE 2 CHRONIC KIDNEY DISEASE, WITHOUT LONG-TERM CURRENT USE OF INSULIN (HCC): ICD-10-CM

## 2021-01-05 DIAGNOSIS — N18.2 UNCONTROLLED TYPE 2 DIABETES MELLITUS WITH STAGE 2 CHRONIC KIDNEY DISEASE, WITHOUT LONG-TERM CURRENT USE OF INSULIN (HCC): ICD-10-CM

## 2021-01-05 DIAGNOSIS — E11.65 UNCONTROLLED TYPE 2 DIABETES MELLITUS WITH STAGE 2 CHRONIC KIDNEY DISEASE, WITHOUT LONG-TERM CURRENT USE OF INSULIN (HCC): ICD-10-CM

## 2021-01-05 RX ORDER — OMEPRAZOLE 40 MG/1
40 CAPSULE, DELAYED RELEASE ORAL DAILY
Qty: 90 CAPSULE | Refills: 0 | Status: SHIPPED | OUTPATIENT
Start: 2021-01-05 | End: 2021-04-05

## 2021-01-05 RX ORDER — LOSARTAN POTASSIUM AND HYDROCHLOROTHIAZIDE 12.5; 5 MG/1; MG/1
1 TABLET ORAL DAILY
Qty: 90 TABLET | Refills: 0 | Status: SHIPPED | OUTPATIENT
Start: 2021-01-05 | End: 2021-04-05

## 2021-01-05 NOTE — TELEPHONE ENCOUNTER
PATIENT STATES SHE WILL NOT SCHEDULE APPT RIGHT NOW DUE TO NEW GRANDCHILD. REFUSED TELEMEDICINE VISIT SINCE SHE IS DUE FOR LABS. WANTS TO WAIT UNTIL MARCH WHEN SHE IS DUE FOR VACCINE AND WILL DO LABS AT THAT TIME. WILL NEED MEDICATION.

## 2021-01-05 NOTE — TELEPHONE ENCOUNTER
LOSARTAN POTASSIUM-HCTZ 50-12.5 MG Oral Tab   OMEPRAZOLE 40 MG Oral Capsule Delayed Release  Sonam Carr

## 2021-01-05 NOTE — TELEPHONE ENCOUNTER
Last OV w/Dr Moi Newman 8/26/20  Last CMP 8/26/20, last Hgba1c 6/29/20  No future appointments scheduled. Last refills 10/7/20. LM for patient to call and schedule an appointments with Dr Moi Newman and have lab work done.

## 2021-01-11 RX ORDER — SEMAGLUTIDE 1.34 MG/ML
1 INJECTION, SOLUTION SUBCUTANEOUS
Qty: 3 ML | Refills: 0 | Status: SHIPPED | OUTPATIENT
Start: 2021-01-11 | End: 2021-02-09

## 2021-01-11 NOTE — TELEPHONE ENCOUNTER
Last refill 3mL on 12/21/2020  Last office visit pertaining to refill on 8/26/2020  No future appointments. Patient is due for labs  Reminder letter sent.

## 2021-02-08 NOTE — TELEPHONE ENCOUNTER
Last OV w/Dr Gideon Yee 8/26/20  Last Lipid and Hgba1c 6/29/20  Last refills on Ozempic 1/11/21  Last refill on Atorvastatin 11/11/20 #90  Last refill on Bupropion 8/31/20 #180  No future appointments scheduled  Protocol not met

## 2021-02-09 RX ORDER — ATORVASTATIN CALCIUM 40 MG/1
40 TABLET, FILM COATED ORAL NIGHTLY
Qty: 90 TABLET | Refills: 0 | Status: SHIPPED | OUTPATIENT
Start: 2021-02-09 | End: 2021-05-10

## 2021-02-09 RX ORDER — SEMAGLUTIDE 1.34 MG/ML
1 INJECTION, SOLUTION SUBCUTANEOUS
Qty: 3 ML | Refills: 0 | Status: SHIPPED | OUTPATIENT
Start: 2021-02-09 | End: 2021-03-15

## 2021-02-09 RX ORDER — BUPROPION HYDROCHLORIDE 75 MG/1
75 TABLET ORAL 2 TIMES DAILY
Qty: 180 TABLET | Refills: 0 | Status: SHIPPED | OUTPATIENT
Start: 2021-02-09 | End: 2021-08-09

## 2021-02-25 DIAGNOSIS — N18.2 UNCONTROLLED TYPE 2 DIABETES MELLITUS WITH STAGE 2 CHRONIC KIDNEY DISEASE, WITHOUT LONG-TERM CURRENT USE OF INSULIN (HCC): ICD-10-CM

## 2021-02-25 DIAGNOSIS — E11.69 HYPERLIPIDEMIA ASSOCIATED WITH TYPE 2 DIABETES MELLITUS (HCC): ICD-10-CM

## 2021-02-25 DIAGNOSIS — E11.22 UNCONTROLLED TYPE 2 DIABETES MELLITUS WITH STAGE 2 CHRONIC KIDNEY DISEASE, WITHOUT LONG-TERM CURRENT USE OF INSULIN (HCC): ICD-10-CM

## 2021-02-25 DIAGNOSIS — E78.5 HYPERLIPIDEMIA ASSOCIATED WITH TYPE 2 DIABETES MELLITUS (HCC): ICD-10-CM

## 2021-02-25 DIAGNOSIS — E11.65 UNCONTROLLED TYPE 2 DIABETES MELLITUS WITH STAGE 2 CHRONIC KIDNEY DISEASE, WITHOUT LONG-TERM CURRENT USE OF INSULIN (HCC): ICD-10-CM

## 2021-02-25 DIAGNOSIS — I10 ESSENTIAL HYPERTENSION, BENIGN: ICD-10-CM

## 2021-02-26 ENCOUNTER — TELEPHONE (OUTPATIENT)
Dept: FAMILY MEDICINE CLINIC | Facility: CLINIC | Age: 61
End: 2021-02-26

## 2021-02-26 RX ORDER — DAPAGLIFLOZIN 10 MG/1
10 TABLET, FILM COATED ORAL DAILY
Qty: 30 TABLET | Refills: 0 | Status: SHIPPED | OUTPATIENT
Start: 2021-02-26 | End: 2021-03-16

## 2021-02-26 RX ORDER — GLIPIZIDE 10 MG/1
10 TABLET, FILM COATED, EXTENDED RELEASE ORAL
Qty: 60 TABLET | Refills: 0 | Status: SHIPPED | OUTPATIENT
Start: 2021-02-26 | End: 2021-03-16

## 2021-02-26 RX ORDER — METFORMIN HYDROCHLORIDE 750 MG/1
750 TABLET, EXTENDED RELEASE ORAL
Qty: 30 TABLET | Refills: 0 | Status: SHIPPED | OUTPATIENT
Start: 2021-02-26 | End: 2021-03-16

## 2021-02-26 NOTE — TELEPHONE ENCOUNTER
Last refill on metformin #90 on 11/20/2020  Last refill on farxiga #90 x 3 on 2/14/2020  Last refill on glipizide #90 x 3 on 2/14/2020    Last office visit pertaining to refill on 8/26/2020  No future appointments.   Last a1c on 6/29/2020  Patient is due fo

## 2021-02-26 NOTE — TELEPHONE ENCOUNTER
FARXIGA 10 MG Oral Tab   GLIPIZIDE ER 10 MG Oral Tablet 24 Hr   METFORMIN HCL  MG Oral Tablet 24 Hr   PLEASE SEND REFILL TO [facility] [place].   SHE DOES HAVE AN APPT 3/16/96134 TO SEE DR Radha Park

## 2021-03-15 RX ORDER — SEMAGLUTIDE 1.34 MG/ML
INJECTION, SOLUTION SUBCUTANEOUS
Qty: 3 ML | Refills: 0 | Status: SHIPPED | OUTPATIENT
Start: 2021-03-15 | End: 2021-04-05

## 2021-03-15 NOTE — TELEPHONE ENCOUNTER
Last refill #3mL on 2/9/2021  Last office visit pertaining to refill on 8/26/2020  Future Appointments   Date Time Provider Rolan Ginger   3/16/2021 10:00 AM Anderson Bobby MD [facility] [place]     Last A1c value was 9% done 6/29/2020.

## 2021-03-16 ENCOUNTER — OFFICE VISIT (OUTPATIENT)
Dept: FAMILY MEDICINE CLINIC | Facility: CLINIC | Age: 61
End: 2021-03-16
Payer: COMMERCIAL

## 2021-03-16 ENCOUNTER — LAB ENCOUNTER (OUTPATIENT)
Dept: LAB | Age: 61
End: 2021-03-16
Attending: INTERNAL MEDICINE
Payer: COMMERCIAL

## 2021-03-16 VITALS
OXYGEN SATURATION: 97 % | HEART RATE: 86 BPM | SYSTOLIC BLOOD PRESSURE: 128 MMHG | RESPIRATION RATE: 18 BRPM | DIASTOLIC BLOOD PRESSURE: 70 MMHG | TEMPERATURE: 99 F | BODY MASS INDEX: 30.82 KG/M2 | HEIGHT: 65 IN | WEIGHT: 185 LBS

## 2021-03-16 DIAGNOSIS — I10 ESSENTIAL HYPERTENSION, BENIGN: ICD-10-CM

## 2021-03-16 DIAGNOSIS — E11.69 HYPERLIPIDEMIA ASSOCIATED WITH TYPE 2 DIABETES MELLITUS (HCC): ICD-10-CM

## 2021-03-16 DIAGNOSIS — E11.22 UNCONTROLLED TYPE 2 DIABETES MELLITUS WITH STAGE 2 CHRONIC KIDNEY DISEASE, WITHOUT LONG-TERM CURRENT USE OF INSULIN (HCC): ICD-10-CM

## 2021-03-16 DIAGNOSIS — E78.5 HYPERLIPIDEMIA ASSOCIATED WITH TYPE 2 DIABETES MELLITUS (HCC): ICD-10-CM

## 2021-03-16 DIAGNOSIS — N18.2 UNCONTROLLED TYPE 2 DIABETES MELLITUS WITH STAGE 2 CHRONIC KIDNEY DISEASE, WITHOUT LONG-TERM CURRENT USE OF INSULIN (HCC): ICD-10-CM

## 2021-03-16 DIAGNOSIS — E11.65 UNCONTROLLED TYPE 2 DIABETES MELLITUS WITH STAGE 2 CHRONIC KIDNEY DISEASE, WITHOUT LONG-TERM CURRENT USE OF INSULIN (HCC): ICD-10-CM

## 2021-03-16 DIAGNOSIS — E11.65 UNCONTROLLED TYPE 2 DIABETES MELLITUS WITH STAGE 2 CHRONIC KIDNEY DISEASE, WITHOUT LONG-TERM CURRENT USE OF INSULIN (HCC): Primary | ICD-10-CM

## 2021-03-16 DIAGNOSIS — E11.22 UNCONTROLLED TYPE 2 DIABETES MELLITUS WITH STAGE 2 CHRONIC KIDNEY DISEASE, WITHOUT LONG-TERM CURRENT USE OF INSULIN (HCC): Primary | ICD-10-CM

## 2021-03-16 DIAGNOSIS — N18.2 UNCONTROLLED TYPE 2 DIABETES MELLITUS WITH STAGE 2 CHRONIC KIDNEY DISEASE, WITHOUT LONG-TERM CURRENT USE OF INSULIN (HCC): Primary | ICD-10-CM

## 2021-03-16 LAB
ALBUMIN SERPL-MCNC: 3.9 G/DL (ref 3.4–5)
ALBUMIN/GLOB SERPL: 1.1 {RATIO} (ref 1–2)
ALP LIVER SERPL-CCNC: 120 U/L
ALT SERPL-CCNC: 43 U/L
ANION GAP SERPL CALC-SCNC: 7 MMOL/L (ref 0–18)
AST SERPL-CCNC: 21 U/L (ref 15–37)
BASOPHILS # BLD AUTO: 0.07 X10(3) UL (ref 0–0.2)
BASOPHILS NFR BLD AUTO: 0.9 %
BILIRUB SERPL-MCNC: 0.6 MG/DL (ref 0.1–2)
BUN BLD-MCNC: 11 MG/DL (ref 7–18)
BUN/CREAT SERPL: 14.1 (ref 10–20)
CALCIUM BLD-MCNC: 9.9 MG/DL (ref 8.5–10.1)
CHLORIDE SERPL-SCNC: 105 MMOL/L (ref 98–112)
CHOLEST SMN-MCNC: 192 MG/DL (ref ?–200)
CO2 SERPL-SCNC: 26 MMOL/L (ref 21–32)
CREAT BLD-MCNC: 0.78 MG/DL
DEPRECATED RDW RBC AUTO: 43.6 FL (ref 35.1–46.3)
EOSINOPHIL # BLD AUTO: 0.2 X10(3) UL (ref 0–0.7)
EOSINOPHIL NFR BLD AUTO: 2.5 %
ERYTHROCYTE [DISTWIDTH] IN BLOOD BY AUTOMATED COUNT: 13.9 % (ref 11–15)
EST. AVERAGE GLUCOSE BLD GHB EST-MCNC: 209 MG/DL (ref 68–126)
GLOBULIN PLAS-MCNC: 3.7 G/DL (ref 2.8–4.4)
GLUCOSE BLD-MCNC: 188 MG/DL (ref 70–99)
HBA1C MFR BLD HPLC: 8.9 % (ref ?–5.7)
HCT VFR BLD AUTO: 47.5 %
HDLC SERPL-MCNC: 45 MG/DL (ref 40–59)
HGB BLD-MCNC: 15.2 G/DL
IMM GRANULOCYTES # BLD AUTO: 0.04 X10(3) UL (ref 0–1)
IMM GRANULOCYTES NFR BLD: 0.5 %
LDLC SERPL CALC-MCNC: 99 MG/DL (ref ?–100)
LYMPHOCYTES # BLD AUTO: 2.04 X10(3) UL (ref 1–4)
LYMPHOCYTES NFR BLD AUTO: 25.5 %
M PROTEIN MFR SERPL ELPH: 7.6 G/DL (ref 6.4–8.2)
MCH RBC QN AUTO: 27.7 PG (ref 26–34)
MCHC RBC AUTO-ENTMCNC: 32 G/DL (ref 31–37)
MCV RBC AUTO: 86.5 FL
MONOCYTES # BLD AUTO: 0.54 X10(3) UL (ref 0.1–1)
MONOCYTES NFR BLD AUTO: 6.7 %
NEUTROPHILS # BLD AUTO: 5.12 X10 (3) UL (ref 1.5–7.7)
NEUTROPHILS # BLD AUTO: 5.12 X10(3) UL (ref 1.5–7.7)
NEUTROPHILS NFR BLD AUTO: 63.9 %
NONHDLC SERPL-MCNC: 147 MG/DL (ref ?–130)
OSMOLALITY SERPL CALC.SUM OF ELEC: 290 MOSM/KG (ref 275–295)
PLATELET # BLD AUTO: 321 10(3)UL (ref 150–450)
POTASSIUM SERPL-SCNC: 4 MMOL/L (ref 3.5–5.1)
RBC # BLD AUTO: 5.49 X10(6)UL
SODIUM SERPL-SCNC: 138 MMOL/L (ref 136–145)
TRIGL SERPL-MCNC: 239 MG/DL (ref 30–149)
VLDLC SERPL CALC-MCNC: 48 MG/DL (ref 0–30)
WBC # BLD AUTO: 8 X10(3) UL (ref 4–11)

## 2021-03-16 PROCEDURE — 83036 HEMOGLOBIN GLYCOSYLATED A1C: CPT

## 2021-03-16 PROCEDURE — 99214 OFFICE O/P EST MOD 30 MIN: CPT | Performed by: INTERNAL MEDICINE

## 2021-03-16 PROCEDURE — 3008F BODY MASS INDEX DOCD: CPT | Performed by: INTERNAL MEDICINE

## 2021-03-16 PROCEDURE — 36415 COLL VENOUS BLD VENIPUNCTURE: CPT

## 2021-03-16 PROCEDURE — 80061 LIPID PANEL: CPT

## 2021-03-16 PROCEDURE — 3078F DIAST BP <80 MM HG: CPT | Performed by: INTERNAL MEDICINE

## 2021-03-16 PROCEDURE — 3074F SYST BP LT 130 MM HG: CPT | Performed by: INTERNAL MEDICINE

## 2021-03-16 PROCEDURE — 85025 COMPLETE CBC W/AUTO DIFF WBC: CPT

## 2021-03-16 PROCEDURE — 80053 COMPREHEN METABOLIC PANEL: CPT

## 2021-03-16 PROCEDURE — 3052F HG A1C>EQUAL 8.0%<EQUAL 9.0%: CPT | Performed by: PHYSICIAN ASSISTANT

## 2021-03-16 NOTE — PROGRESS NOTES
HPI:   Ros Morse is a 63year old female who presents for recheck of her diabetes. Patient does not check BG. Last visit with ophthalmologist was last year. Pt has been checking her feet on a regular basis. Pt denies any tingling of the feet.  Pt FOR OFFICE VISIT AND LABS PRIOR TO NEXT REFILL 30 tablet 0   • glipiZIDE ER 10 MG Oral Tablet 24 Hr Take 1 tablet (10 mg total) by mouth 2 (two) times daily before meals.  DUE FOR OFFICE VISIT AND LABS PRIOR TO NEXT REFILL 60 tablet 0   • buPROPion HCl 75 M SURGERY  1977   • CHOLECYSTECTOMY     • COLECTOMY  12/2014    removed 10 inches of colon for diverticulitis   • COLONOSCOPY  11/14    diverticulitis   • HAND/FINGER SURGERY UNLISTED Left 02/2018   • HYSTERECTOMY  1994    LAVH for endometriosis (Ovaries lef monofilament/sensation of both feet is normal.  Pulsation pedal pulse exam of both lower legs/feet is normal as well.   NEURO: sensation is intact to monofilament     ASSESSMENT AND PLAN:   Kaur Hairston is a 63year old female who presents for a reche

## 2021-03-17 ENCOUNTER — TELEPHONE (OUTPATIENT)
Dept: FAMILY MEDICINE CLINIC | Facility: CLINIC | Age: 61
End: 2021-03-17

## 2021-03-17 DIAGNOSIS — N18.2 UNCONTROLLED TYPE 2 DIABETES MELLITUS WITH STAGE 2 CHRONIC KIDNEY DISEASE, WITHOUT LONG-TERM CURRENT USE OF INSULIN (HCC): ICD-10-CM

## 2021-03-17 DIAGNOSIS — E11.65 UNCONTROLLED TYPE 2 DIABETES MELLITUS WITH STAGE 2 CHRONIC KIDNEY DISEASE, WITHOUT LONG-TERM CURRENT USE OF INSULIN (HCC): ICD-10-CM

## 2021-03-17 DIAGNOSIS — E11.69 HYPERLIPIDEMIA ASSOCIATED WITH TYPE 2 DIABETES MELLITUS (HCC): ICD-10-CM

## 2021-03-17 DIAGNOSIS — I10 ESSENTIAL HYPERTENSION, BENIGN: ICD-10-CM

## 2021-03-17 DIAGNOSIS — E11.22 UNCONTROLLED TYPE 2 DIABETES MELLITUS WITH STAGE 2 CHRONIC KIDNEY DISEASE, WITHOUT LONG-TERM CURRENT USE OF INSULIN (HCC): ICD-10-CM

## 2021-03-17 DIAGNOSIS — E78.5 HYPERLIPIDEMIA ASSOCIATED WITH TYPE 2 DIABETES MELLITUS (HCC): ICD-10-CM

## 2021-03-17 RX ORDER — METFORMIN HYDROCHLORIDE 750 MG/1
TABLET, EXTENDED RELEASE ORAL
Qty: 90 TABLET | Refills: 0 | COMMUNITY
Start: 2021-03-17 | End: 2021-06-23

## 2021-03-17 NOTE — TELEPHONE ENCOUNTER
You have to remember to aamir all the medication all the time. Get a pill case reminder this shoould help remind you and help quantify hw much you are missing you are at the TOP doses of these meds so I will have to add something new.   Lets just try better

## 2021-03-17 NOTE — TELEPHONE ENCOUNTER
Patient saw her lab results on Mychart. She said that her stools are already \"loose\" so she does not want to add another Metformin. She said that she sometimes forgets to take her Glipizide and [place].  Is there something else Dr Ligia Alicia wants her to take

## 2021-04-05 DIAGNOSIS — I10 ESSENTIAL HYPERTENSION, BENIGN: ICD-10-CM

## 2021-04-05 DIAGNOSIS — E11.65 UNCONTROLLED TYPE 2 DIABETES MELLITUS WITH STAGE 2 CHRONIC KIDNEY DISEASE, WITHOUT LONG-TERM CURRENT USE OF INSULIN (HCC): ICD-10-CM

## 2021-04-05 DIAGNOSIS — E11.69 HYPERLIPIDEMIA ASSOCIATED WITH TYPE 2 DIABETES MELLITUS (HCC): ICD-10-CM

## 2021-04-05 DIAGNOSIS — E78.5 HYPERLIPIDEMIA ASSOCIATED WITH TYPE 2 DIABETES MELLITUS (HCC): ICD-10-CM

## 2021-04-05 DIAGNOSIS — E11.22 UNCONTROLLED TYPE 2 DIABETES MELLITUS WITH STAGE 2 CHRONIC KIDNEY DISEASE, WITHOUT LONG-TERM CURRENT USE OF INSULIN (HCC): ICD-10-CM

## 2021-04-05 DIAGNOSIS — N18.2 UNCONTROLLED TYPE 2 DIABETES MELLITUS WITH STAGE 2 CHRONIC KIDNEY DISEASE, WITHOUT LONG-TERM CURRENT USE OF INSULIN (HCC): ICD-10-CM

## 2021-04-05 RX ORDER — OMEPRAZOLE 40 MG/1
CAPSULE, DELAYED RELEASE ORAL
Qty: 90 CAPSULE | Refills: 0 | Status: SHIPPED | OUTPATIENT
Start: 2021-04-05 | End: 2021-07-06

## 2021-04-05 RX ORDER — SEMAGLUTIDE 1.34 MG/ML
INJECTION, SOLUTION SUBCUTANEOUS
Qty: 3 ML | Refills: 0 | Status: SHIPPED | OUTPATIENT
Start: 2021-04-05 | End: 2021-05-10

## 2021-04-05 RX ORDER — LOSARTAN POTASSIUM AND HYDROCHLOROTHIAZIDE 12.5; 5 MG/1; MG/1
1 TABLET ORAL DAILY
Qty: 90 TABLET | Refills: 1 | Status: SHIPPED | OUTPATIENT
Start: 2021-04-05 | End: 2021-07-06

## 2021-04-05 NOTE — TELEPHONE ENCOUNTER
Hypertension Medications Protocol Aaaswf1004/05/2021 11:44 AM   CMP or BMP in past 12 months Protocol Details    Last serum creatinine< 2.0     Appointment in past 6 or next 3 months      Last refill of Losartan/HCTZ - 1/5/21 - #90  Last CMP - 3/16/21 - crea

## 2021-04-30 ENCOUNTER — TELEPHONE (OUTPATIENT)
Dept: FAMILY MEDICINE CLINIC | Facility: CLINIC | Age: 61
End: 2021-04-30

## 2021-04-30 ENCOUNTER — NURSE ONLY (OUTPATIENT)
Dept: FAMILY MEDICINE CLINIC | Facility: CLINIC | Age: 61
End: 2021-04-30
Payer: COMMERCIAL

## 2021-04-30 PROCEDURE — 90471 IMMUNIZATION ADMIN: CPT | Performed by: FAMILY MEDICINE

## 2021-05-03 ENCOUNTER — MED REC SCAN ONLY (OUTPATIENT)
Dept: FAMILY MEDICINE CLINIC | Facility: CLINIC | Age: 61
End: 2021-05-03

## 2021-05-10 RX ORDER — SEMAGLUTIDE 1.34 MG/ML
INJECTION, SOLUTION SUBCUTANEOUS
Qty: 3 ML | Refills: 0 | Status: SHIPPED | OUTPATIENT
Start: 2021-05-10 | End: 2021-06-03

## 2021-05-10 RX ORDER — ATORVASTATIN CALCIUM 40 MG/1
40 TABLET, FILM COATED ORAL NIGHTLY
Qty: 90 TABLET | Refills: 3 | Status: SHIPPED | OUTPATIENT
Start: 2021-05-10 | End: 2021-08-09

## 2021-05-10 NOTE — TELEPHONE ENCOUNTER
Last refill on atorvastatin #90 on 2/9/2021  Last refill on ozempic 3M; on 4/5/2021  Last office visit pertaining to refill on 3/16/2021  No future appointments. Last A1c value was 8.9% done 3/16/2021.   Labs current until 6/17/2021

## 2021-05-17 ENCOUNTER — HOSPITAL ENCOUNTER (OUTPATIENT)
Age: 61
Discharge: HOME | End: 2021-05-17
Payer: COMMERCIAL

## 2021-05-17 VITALS
HEART RATE: 100 BPM | DIASTOLIC BLOOD PRESSURE: 86 MMHG | TEMPERATURE: 97 F | OXYGEN SATURATION: 95 % | SYSTOLIC BLOOD PRESSURE: 135 MMHG | RESPIRATION RATE: 16 BRPM

## 2021-05-17 DIAGNOSIS — S61.213A LACERATION OF LEFT MIDDLE FINGER WITHOUT FOREIGN BODY, NAIL DAMAGE STATUS UNSPECIFIED, INITIAL ENCOUNTER: Primary | ICD-10-CM

## 2021-05-17 PROCEDURE — 90471 IMMUNIZATION ADMIN: CPT | Performed by: NURSE PRACTITIONER

## 2021-05-17 PROCEDURE — 99213 OFFICE O/P EST LOW 20 MIN: CPT | Performed by: NURSE PRACTITIONER

## 2021-05-17 PROCEDURE — 90715 TDAP VACCINE 7 YRS/> IM: CPT | Performed by: NURSE PRACTITIONER

## 2021-05-17 NOTE — ED PROVIDER NOTES
Patient Seen in: [facility]      History   Patient presents with:  Laceration/Abrasion    Stated Complaint: laceration middle finger right hand    HPI/Subjective:   13-year-old female presents the IC with a laceration to the right middle finge back removed   • REDUCTION LEFT  1976   • REDUCTION RIGHT  1976                Social History    Tobacco Use      Smoking status: Never Smoker      Smokeless tobacco: Never Used    Vaping Use      Vaping Use: Never used    Alcohol use: No      Alcohol/week diagnosis, treatment plan, warning signs and symptoms which should prompt immediate return. The patient and/or family expressed clear understanding of these instructions and agrees to the following plan provided.   The patient and/or family was also given

## 2021-05-18 ENCOUNTER — TELEPHONE (OUTPATIENT)
Dept: FAMILY MEDICINE CLINIC | Facility: CLINIC | Age: 61
End: 2021-05-18

## 2021-05-18 NOTE — TELEPHONE ENCOUNTER
Contacted patient. Advised that the Shingrix vaccine that she received on 4/30/21 was stored at a potentially colder temperature variation. Dr. Dao Valverde recommends that this not be repeated. Patient verbalizes understanding.

## 2021-05-25 NOTE — TELEPHONE ENCOUNTER
Patient calls back. States that she does not feel that she should pay for vaccine that was given due to a potential problem with vaccine. States that her insurance is denying the charge.  States she will pay for the administration fee, but does not feel t

## 2021-05-25 NOTE — TELEPHONE ENCOUNTER
Patient advised - after speaking to management - $194.00 for cost of vaccine will be voided. Patient verbalizes understanding.

## 2021-06-03 ENCOUNTER — TELEPHONE (OUTPATIENT)
Dept: FAMILY MEDICINE CLINIC | Facility: CLINIC | Age: 61
End: 2021-06-03

## 2021-06-03 RX ORDER — SEMAGLUTIDE 1.34 MG/ML
1 INJECTION, SOLUTION SUBCUTANEOUS
Qty: 12 ML | Refills: 0 | Status: SHIPPED | OUTPATIENT
Start: 2021-06-03 | End: 2021-08-11

## 2021-06-03 NOTE — TELEPHONE ENCOUNTER
Needs Ozempic ordered for three months, she has a card from the company. She is eligible for 3 months for $25.  States she has a restriction of 2 hgba1c  2 times a year. States she and her  are both working very hard on their diets.   As she needs

## 2021-06-03 NOTE — TELEPHONE ENCOUNTER
Patient would like to speak with a nurse regarding transferring all of [facility] prescriptions [facility] [place]. She would also like discuss another issue.

## 2021-06-23 DIAGNOSIS — E11.65 UNCONTROLLED TYPE 2 DIABETES MELLITUS WITH STAGE 2 CHRONIC KIDNEY DISEASE, WITHOUT LONG-TERM CURRENT USE OF INSULIN (HCC): ICD-10-CM

## 2021-06-23 DIAGNOSIS — I10 ESSENTIAL HYPERTENSION, BENIGN: ICD-10-CM

## 2021-06-23 DIAGNOSIS — E11.22 UNCONTROLLED TYPE 2 DIABETES MELLITUS WITH STAGE 2 CHRONIC KIDNEY DISEASE, WITHOUT LONG-TERM CURRENT USE OF INSULIN (HCC): ICD-10-CM

## 2021-06-23 DIAGNOSIS — E11.69 HYPERLIPIDEMIA ASSOCIATED WITH TYPE 2 DIABETES MELLITUS (HCC): ICD-10-CM

## 2021-06-23 DIAGNOSIS — E78.5 HYPERLIPIDEMIA ASSOCIATED WITH TYPE 2 DIABETES MELLITUS (HCC): ICD-10-CM

## 2021-06-23 DIAGNOSIS — N18.2 UNCONTROLLED TYPE 2 DIABETES MELLITUS WITH STAGE 2 CHRONIC KIDNEY DISEASE, WITHOUT LONG-TERM CURRENT USE OF INSULIN (HCC): ICD-10-CM

## 2021-06-23 RX ORDER — GLIPIZIDE 10 MG/1
10 TABLET, FILM COATED, EXTENDED RELEASE ORAL
Qty: 180 TABLET | Refills: 0 | Status: SHIPPED | OUTPATIENT
Start: 2021-06-23 | End: 2021-09-15

## 2021-06-23 RX ORDER — DAPAGLIFLOZIN 10 MG/1
10 TABLET, FILM COATED ORAL DAILY
Qty: 90 TABLET | Refills: 0 | Status: SHIPPED | OUTPATIENT
Start: 2021-06-23 | End: 2021-09-15

## 2021-06-23 RX ORDER — METFORMIN HYDROCHLORIDE 750 MG/1
TABLET, EXTENDED RELEASE ORAL
Qty: 90 TABLET | Refills: 0 | Status: SHIPPED | OUTPATIENT
Start: 2021-06-23 | End: 2021-09-15

## 2021-06-23 NOTE — TELEPHONE ENCOUNTER
glipiZIDE ER 10 MG Oral Tablet 24 Hr   FARXIGA 10 MG Oral Tab   metFORMIN HCl  MG Oral Tablet 24 Hr    PLEASE SEND REFILL TO [facility]

## 2021-06-23 NOTE — TELEPHONE ENCOUNTER
Last Ov w/Dr Jayne Vines 3/16/21  Last labs 3/16/21  Last refills 3/16/21  Patient advised that she is dues to see Dr Jayne Vines and have labs done. She will call back to scheduled. She is asking for a 90 days supply because it is \"cheaper\".

## 2021-07-06 DIAGNOSIS — N18.2 UNCONTROLLED TYPE 2 DIABETES MELLITUS WITH STAGE 2 CHRONIC KIDNEY DISEASE, WITHOUT LONG-TERM CURRENT USE OF INSULIN (HCC): ICD-10-CM

## 2021-07-06 DIAGNOSIS — E11.69 HYPERLIPIDEMIA ASSOCIATED WITH TYPE 2 DIABETES MELLITUS (HCC): ICD-10-CM

## 2021-07-06 DIAGNOSIS — E11.22 UNCONTROLLED TYPE 2 DIABETES MELLITUS WITH STAGE 2 CHRONIC KIDNEY DISEASE, WITHOUT LONG-TERM CURRENT USE OF INSULIN (HCC): ICD-10-CM

## 2021-07-06 DIAGNOSIS — E11.65 UNCONTROLLED TYPE 2 DIABETES MELLITUS WITH STAGE 2 CHRONIC KIDNEY DISEASE, WITHOUT LONG-TERM CURRENT USE OF INSULIN (HCC): ICD-10-CM

## 2021-07-06 DIAGNOSIS — I10 ESSENTIAL HYPERTENSION, BENIGN: ICD-10-CM

## 2021-07-06 DIAGNOSIS — E78.5 HYPERLIPIDEMIA ASSOCIATED WITH TYPE 2 DIABETES MELLITUS (HCC): ICD-10-CM

## 2021-07-06 RX ORDER — LOSARTAN POTASSIUM AND HYDROCHLOROTHIAZIDE 12.5; 5 MG/1; MG/1
1 TABLET ORAL DAILY
Qty: 90 TABLET | Refills: 1 | Status: SHIPPED | OUTPATIENT
Start: 2021-07-06 | End: 2022-01-10

## 2021-07-06 RX ORDER — OMEPRAZOLE 40 MG/1
40 CAPSULE, DELAYED RELEASE ORAL DAILY
Qty: 90 CAPSULE | Refills: 0 | Status: SHIPPED | OUTPATIENT
Start: 2021-07-06 | End: 2021-09-27

## 2021-07-06 NOTE — TELEPHONE ENCOUNTER
Last OV w/Dr Gideon Yee 3/16/21  Last labs 3/16/21   Last refills 4/5/21  No future appointments scheduled.

## 2021-07-06 NOTE — TELEPHONE ENCOUNTER
OMEPRAZOLE 40 MG Oral Capsule Delayed Release and LOSARTAN POTASSIUM-HCTZ 50-12.5 MG Oral Tab [facility] [place] is switching pharmacy's

## 2021-07-07 ENCOUNTER — TELEPHONE (OUTPATIENT)
Dept: FAMILY MEDICINE CLINIC | Facility: CLINIC | Age: 61
End: 2021-07-07

## 2021-07-07 NOTE — TELEPHONE ENCOUNTER
Losartan refill/pt. Is stating she has allergies to sulfa. Is this a issue. Pt. Is NOT waiting so no rush.

## 2021-08-09 RX ORDER — BUPROPION HYDROCHLORIDE 75 MG/1
75 TABLET ORAL 2 TIMES DAILY
Qty: 60 TABLET | Refills: 0 | Status: SHIPPED | OUTPATIENT
Start: 2021-08-09 | End: 2021-08-11

## 2021-08-09 RX ORDER — ATORVASTATIN CALCIUM 40 MG/1
40 TABLET, FILM COATED ORAL NIGHTLY
Qty: 30 TABLET | Refills: 0 | Status: SHIPPED | OUTPATIENT
Start: 2021-08-09 | End: 2021-08-11

## 2021-08-09 RX ORDER — ALPRAZOLAM 0.5 MG/1
0.5 TABLET ORAL NIGHTLY PRN
Qty: 30 TABLET | Refills: 0 | OUTPATIENT
Start: 2021-08-09 | End: 2021-09-08

## 2021-08-09 NOTE — TELEPHONE ENCOUNTER
Last labs OV w/Dr Rae Wells 3/16/21-Diabetes  Patient is due for a physical and lab work. LM for patient that Dr Rae Wells said she needs to schedule an appointment prior to having medications refilled.

## 2021-08-09 NOTE — TELEPHONE ENCOUNTER
Patient advised. She said she is very busy right now. She can't come in until the end of the month. Appointment scheduled 8/26/21 She is almost out of Atorvastatin.

## 2021-08-10 ENCOUNTER — PATIENT MESSAGE (OUTPATIENT)
Dept: FAMILY MEDICINE CLINIC | Facility: CLINIC | Age: 61
End: 2021-08-10

## 2021-08-11 RX ORDER — ATORVASTATIN CALCIUM 40 MG/1
40 TABLET, FILM COATED ORAL NIGHTLY
Qty: 90 TABLET | Refills: 0 | Status: SHIPPED | OUTPATIENT
Start: 2021-08-11 | End: 2021-09-27

## 2021-08-11 RX ORDER — SEMAGLUTIDE 1.34 MG/ML
INJECTION, SOLUTION SUBCUTANEOUS
Refills: 1 | OUTPATIENT
Start: 2021-08-11

## 2021-08-11 RX ORDER — SEMAGLUTIDE 1.34 MG/ML
1 INJECTION, SOLUTION SUBCUTANEOUS
Qty: 12 ML | Refills: 0 | Status: SHIPPED | OUTPATIENT
Start: 2021-08-11 | End: 2021-11-02

## 2021-08-11 RX ORDER — BUPROPION HYDROCHLORIDE 75 MG/1
75 TABLET ORAL 2 TIMES DAILY
Qty: 180 TABLET | Refills: 0 | Status: SHIPPED | OUTPATIENT
Start: 2021-08-11 | End: 2021-11-02

## 2021-08-11 NOTE — TELEPHONE ENCOUNTER
Patient is scheduled for an appointment on 8/26/21. She wants a 90 day supply on all meds because it is less expensive. She said she has about 10 Xanax left, advised that she may need to wait until her appointment with Dr Kathrine Jeronimo.  She also wants 90 days of O

## 2021-08-11 NOTE — TELEPHONE ENCOUNTER
From: Christelle Held  To: Lori Valverde MD  Sent: 8/10/2021 7:43 PM CDT  Subject: Prescription Question    I was denied Alprazolam, I have never requested this to be refilled since 10/16/20.  In regards to my Atorvastatin l am not paying for a 30 day pr

## 2021-08-12 ENCOUNTER — OFFICE VISIT (OUTPATIENT)
Dept: FAMILY MEDICINE CLINIC | Facility: CLINIC | Age: 61
End: 2021-08-12
Payer: COMMERCIAL

## 2021-08-12 VITALS
RESPIRATION RATE: 12 BRPM | HEART RATE: 98 BPM | TEMPERATURE: 97 F | HEIGHT: 65 IN | BODY MASS INDEX: 29.32 KG/M2 | WEIGHT: 176 LBS | DIASTOLIC BLOOD PRESSURE: 76 MMHG | OXYGEN SATURATION: 97 % | SYSTOLIC BLOOD PRESSURE: 124 MMHG

## 2021-08-12 DIAGNOSIS — J01.00 ACUTE NON-RECURRENT MAXILLARY SINUSITIS: Primary | ICD-10-CM

## 2021-08-12 LAB
OPERATOR ID: NORMAL
POCT LOT NUMBER: NORMAL
RAPID SARS-COV-2 BY PCR: NOT DETECTED

## 2021-08-12 PROCEDURE — U0002 COVID-19 LAB TEST NON-CDC: HCPCS | Performed by: PHYSICIAN ASSISTANT

## 2021-08-12 PROCEDURE — 3074F SYST BP LT 130 MM HG: CPT | Performed by: PHYSICIAN ASSISTANT

## 2021-08-12 PROCEDURE — 3078F DIAST BP <80 MM HG: CPT | Performed by: PHYSICIAN ASSISTANT

## 2021-08-12 PROCEDURE — 99213 OFFICE O/P EST LOW 20 MIN: CPT | Performed by: PHYSICIAN ASSISTANT

## 2021-08-12 PROCEDURE — 3008F BODY MASS INDEX DOCD: CPT | Performed by: PHYSICIAN ASSISTANT

## 2021-08-12 RX ORDER — DOXYCYCLINE HYCLATE 100 MG
100 TABLET ORAL 2 TIMES DAILY
Qty: 20 TABLET | Refills: 0 | Status: SHIPPED | OUTPATIENT
Start: 2021-08-12 | End: 2021-08-22

## 2021-08-12 NOTE — PATIENT INSTRUCTIONS
Rest   Fluids   Start antibiotic   Continue steroid nasal spray   Saline nasal rinse   Tylenol as needed   Please follow up with PCP if no improvement or if symptoms worsen

## 2021-08-12 NOTE — PROGRESS NOTES
CHIEF COMPLAINT:   Patient presents with:  Sinus Problem      HPI:   Cinthia Dominguez is a 57year old female who presents for cold symptoms for 3 weeks. + nasal congestion and sinus pressure. + headache. Right ear feels full, but no pain.  No sore throat [address]) Does not apply Misc 3 each by Does not apply route every 30 (thirty) days. 3 each 11   • Multiple Vitamins-Minerals (CENTRUM SILVER) Oral Tab Take 1 tablet by mouth daily.      • Saccharomyces boulardii (FLORASTOR OR) Take 1 tablet by m rashes or abnormal skin lesions  HEENT: See HPI. LUNGS: denies shortness of breath or wheezing, See HPI  CARDIOVASCULAR: denies chest pain or palpitations   GI: denies N/V/C or abdominal pain  NEURO: + sinus headaches. No numbness or tingling in face. The patient indicates understanding of these issues and agrees to the plan. The patient is asked to f/u with PCP if sx's persist or worsen.

## 2021-08-26 ENCOUNTER — LAB ENCOUNTER (OUTPATIENT)
Dept: LAB | Age: 61
End: 2021-08-26
Attending: INTERNAL MEDICINE
Payer: COMMERCIAL

## 2021-08-26 ENCOUNTER — OFFICE VISIT (OUTPATIENT)
Dept: FAMILY MEDICINE CLINIC | Facility: CLINIC | Age: 61
End: 2021-08-26
Payer: COMMERCIAL

## 2021-08-26 VITALS
HEART RATE: 101 BPM | DIASTOLIC BLOOD PRESSURE: 80 MMHG | WEIGHT: 176.38 LBS | BODY MASS INDEX: 29.38 KG/M2 | OXYGEN SATURATION: 98 % | TEMPERATURE: 98 F | SYSTOLIC BLOOD PRESSURE: 138 MMHG | HEIGHT: 65 IN

## 2021-08-26 DIAGNOSIS — E11.65 UNCONTROLLED TYPE 2 DIABETES MELLITUS WITH STAGE 2 CHRONIC KIDNEY DISEASE, WITHOUT LONG-TERM CURRENT USE OF INSULIN (HCC): ICD-10-CM

## 2021-08-26 DIAGNOSIS — E11.22 UNCONTROLLED TYPE 2 DIABETES MELLITUS WITH STAGE 2 CHRONIC KIDNEY DISEASE, WITHOUT LONG-TERM CURRENT USE OF INSULIN (HCC): ICD-10-CM

## 2021-08-26 DIAGNOSIS — I10 ESSENTIAL HYPERTENSION, BENIGN: ICD-10-CM

## 2021-08-26 DIAGNOSIS — E11.69 HYPERLIPIDEMIA ASSOCIATED WITH TYPE 2 DIABETES MELLITUS (HCC): ICD-10-CM

## 2021-08-26 DIAGNOSIS — N18.2 UNCONTROLLED TYPE 2 DIABETES MELLITUS WITH STAGE 2 CHRONIC KIDNEY DISEASE, WITHOUT LONG-TERM CURRENT USE OF INSULIN (HCC): ICD-10-CM

## 2021-08-26 DIAGNOSIS — R35.0 URINARY FREQUENCY: ICD-10-CM

## 2021-08-26 DIAGNOSIS — Z00.00 WELL ADULT EXAM: Primary | ICD-10-CM

## 2021-08-26 DIAGNOSIS — E78.5 HYPERLIPIDEMIA ASSOCIATED WITH TYPE 2 DIABETES MELLITUS (HCC): ICD-10-CM

## 2021-08-26 LAB
ALBUMIN SERPL-MCNC: 4.1 G/DL (ref 3.4–5)
ALBUMIN/GLOB SERPL: 1.1 {RATIO} (ref 1–2)
ALP LIVER SERPL-CCNC: 102 U/L
ALT SERPL-CCNC: 45 U/L
ANION GAP SERPL CALC-SCNC: 8 MMOL/L (ref 0–18)
APPEARANCE: CLEAR
AST SERPL-CCNC: 27 U/L (ref 15–37)
BILIRUB SERPL-MCNC: 0.7 MG/DL (ref 0.1–2)
BILIRUBIN: NEGATIVE
BUN BLD-MCNC: 17 MG/DL (ref 7–18)
CALCIUM BLD-MCNC: 9.4 MG/DL (ref 8.5–10.1)
CHLORIDE SERPL-SCNC: 103 MMOL/L (ref 98–112)
CO2 SERPL-SCNC: 27 MMOL/L (ref 21–32)
CREAT BLD-MCNC: 0.85 MG/DL
CREAT UR-SCNC: 39.7 MG/DL
EST. AVERAGE GLUCOSE BLD GHB EST-MCNC: 180 MG/DL (ref 68–126)
GLOBULIN PLAS-MCNC: 3.6 G/DL (ref 2.8–4.4)
GLUCOSE (URINE DIPSTICK): 500 MG/DL
GLUCOSE BLD-MCNC: 152 MG/DL (ref 70–99)
HBA1C MFR BLD HPLC: 7.9 % (ref ?–5.7)
KETONES (URINE DIPSTICK): NEGATIVE MG/DL
LEUKOCYTES: NEGATIVE
M PROTEIN MFR SERPL ELPH: 7.7 G/DL (ref 6.4–8.2)
MICROALBUMIN UR-MCNC: 13.8 MG/DL
MICROALBUMIN/CREAT 24H UR-RTO: 347.6 UG/MG (ref ?–30)
MULTISTIX LOT#: 5077 NUMERIC
NITRITE, URINE: NEGATIVE
OCCULT BLOOD: NEGATIVE
OSMOLALITY SERPL CALC.SUM OF ELEC: 291 MOSM/KG (ref 275–295)
PATIENT FASTING Y/N/NP: YES
PH, URINE: 7 (ref 4.5–8)
POTASSIUM SERPL-SCNC: 4 MMOL/L (ref 3.5–5.1)
SODIUM SERPL-SCNC: 138 MMOL/L (ref 136–145)
SPECIFIC GRAVITY: 1.02 (ref 1–1.03)
URINE-COLOR: YELLOW
UROBILINOGEN,SEMI-QN: 0.2 MG/DL (ref 0–1.9)

## 2021-08-26 PROCEDURE — 3079F DIAST BP 80-89 MM HG: CPT | Performed by: INTERNAL MEDICINE

## 2021-08-26 PROCEDURE — 82043 UR ALBUMIN QUANTITATIVE: CPT | Performed by: INTERNAL MEDICINE

## 2021-08-26 PROCEDURE — 80053 COMPREHEN METABOLIC PANEL: CPT

## 2021-08-26 PROCEDURE — 3075F SYST BP GE 130 - 139MM HG: CPT | Performed by: INTERNAL MEDICINE

## 2021-08-26 PROCEDURE — 3061F NEG MICROALBUMINURIA REV: CPT | Performed by: INTERNAL MEDICINE

## 2021-08-26 PROCEDURE — 3060F POS MICROALBUMINURIA REV: CPT | Performed by: INTERNAL MEDICINE

## 2021-08-26 PROCEDURE — 3008F BODY MASS INDEX DOCD: CPT | Performed by: INTERNAL MEDICINE

## 2021-08-26 PROCEDURE — 3061F NEG MICROALBUMINURIA REV: CPT | Performed by: NURSE PRACTITIONER

## 2021-08-26 PROCEDURE — 3051F HG A1C>EQUAL 7.0%<8.0%: CPT | Performed by: NURSE PRACTITIONER

## 2021-08-26 PROCEDURE — 82570 ASSAY OF URINE CREATININE: CPT | Performed by: INTERNAL MEDICINE

## 2021-08-26 PROCEDURE — 36415 COLL VENOUS BLD VENIPUNCTURE: CPT

## 2021-08-26 PROCEDURE — 3060F POS MICROALBUMINURIA REV: CPT | Performed by: NURSE PRACTITIONER

## 2021-08-26 PROCEDURE — 81003 URINALYSIS AUTO W/O SCOPE: CPT | Performed by: INTERNAL MEDICINE

## 2021-08-26 PROCEDURE — 83036 HEMOGLOBIN GLYCOSYLATED A1C: CPT

## 2021-08-26 PROCEDURE — 99396 PREV VISIT EST AGE 40-64: CPT | Performed by: INTERNAL MEDICINE

## 2021-08-26 RX ORDER — ALPRAZOLAM 0.25 MG/1
0.25 TABLET ORAL 2 TIMES DAILY PRN
Qty: 60 TABLET | Refills: 2 | Status: SHIPPED | OUTPATIENT
Start: 2021-08-26 | End: 2021-09-25

## 2021-08-26 NOTE — PROGRESS NOTES
HPI:   Frantz Gamez is a 57year old female who presents for recheck of her diabetes. Patient’s FBS have been 130-140. Last visit with ophthalmologist was 8/2021. Pt has been checking her feet on a regular basis. Pt denies any tingling of the feet. Pen-injector Inject 1 mg into the skin every 7 days. 12 mL 0   • Omeprazole 40 MG Oral Capsule Delayed Release Take 1 capsule (40 mg total) by mouth daily. 90 capsule 0   • Losartan Potassium-HCTZ 50-12.5 MG Oral Tab Take 1 tablet by mouth daily.  90 tablet of colon for diverticulitis   • COLONOSCOPY  11/14    diverticulitis   • HAND/FINGER SURGERY UNLISTED Left 02/2018   • HYSTERECTOMY  1994    LAVH for endometriosis (Ovaries left intact)   • KNEE SURGERY Left 04/19/2019   • LEG/ANKLE SURGERY PROC UNLISTED check fasting lipids and CMP, lose wgt with carbohydrate controlled diet and exercise, refer to Ophthamology, check feet daily. The patient indicates understanding of these issues and agrees to the plan. The patient is asked to return in 3 months.

## 2021-08-30 ENCOUNTER — TELEPHONE (OUTPATIENT)
Dept: FAMILY MEDICINE CLINIC | Facility: CLINIC | Age: 61
End: 2021-08-30

## 2021-08-31 ENCOUNTER — TELEPHONE (OUTPATIENT)
Dept: FAMILY MEDICINE CLINIC | Facility: CLINIC | Age: 61
End: 2021-08-31

## 2021-09-15 DIAGNOSIS — E11.69 HYPERLIPIDEMIA ASSOCIATED WITH TYPE 2 DIABETES MELLITUS (HCC): ICD-10-CM

## 2021-09-15 DIAGNOSIS — I10 ESSENTIAL HYPERTENSION, BENIGN: ICD-10-CM

## 2021-09-15 DIAGNOSIS — E78.5 HYPERLIPIDEMIA ASSOCIATED WITH TYPE 2 DIABETES MELLITUS (HCC): ICD-10-CM

## 2021-09-15 DIAGNOSIS — N18.2 UNCONTROLLED TYPE 2 DIABETES MELLITUS WITH STAGE 2 CHRONIC KIDNEY DISEASE, WITHOUT LONG-TERM CURRENT USE OF INSULIN (HCC): ICD-10-CM

## 2021-09-15 DIAGNOSIS — E11.22 UNCONTROLLED TYPE 2 DIABETES MELLITUS WITH STAGE 2 CHRONIC KIDNEY DISEASE, WITHOUT LONG-TERM CURRENT USE OF INSULIN (HCC): ICD-10-CM

## 2021-09-15 DIAGNOSIS — E11.65 UNCONTROLLED TYPE 2 DIABETES MELLITUS WITH STAGE 2 CHRONIC KIDNEY DISEASE, WITHOUT LONG-TERM CURRENT USE OF INSULIN (HCC): ICD-10-CM

## 2021-09-15 RX ORDER — METFORMIN HYDROCHLORIDE 750 MG/1
TABLET, EXTENDED RELEASE ORAL
Qty: 90 TABLET | Refills: 0 | Status: SHIPPED | OUTPATIENT
Start: 2021-09-15 | End: 2021-12-20

## 2021-09-15 RX ORDER — DAPAGLIFLOZIN 10 MG/1
TABLET, FILM COATED ORAL
Qty: 90 TABLET | Refills: 0 | Status: SHIPPED | OUTPATIENT
Start: 2021-09-15 | End: 2021-12-20

## 2021-09-15 RX ORDER — GLIPIZIDE 10 MG/1
10 TABLET, FILM COATED, EXTENDED RELEASE ORAL
Qty: 180 TABLET | Refills: 0 | Status: SHIPPED | OUTPATIENT
Start: 2021-09-15 | End: 2021-12-20

## 2021-09-15 NOTE — TELEPHONE ENCOUNTER
LOV 08/26/2021    LAST LAB 08/26/2021 Hgb A1c 7.9    LAST RX  farxiga #90 R0 06/23/2021  Metformin #90 R0 06/23/2021  Glipizide #180 R0 06/23/2021    Next OV    PROTOCOL  Diabetic Medication Protocol Failed 09/15/2021 12:26 AM   Protocol Details  Last HgBA

## 2021-09-20 ENCOUNTER — TELEPHONE (OUTPATIENT)
Dept: FAMILY MEDICINE CLINIC | Facility: CLINIC | Age: 61
End: 2021-09-20

## 2021-09-20 NOTE — TELEPHONE ENCOUNTER
Patient is asking if she eligible for the booster vaccine right now. She is getting the gel shot in knee next week and wants to know if she should wait. She is going to [facility] November 17th.  Patient advised that she should get the Covid booster 2 weeks pr

## 2021-09-27 RX ORDER — ATORVASTATIN CALCIUM 40 MG/1
TABLET, FILM COATED ORAL
Qty: 90 TABLET | Refills: 0 | Status: SHIPPED | OUTPATIENT
Start: 2021-09-27 | End: 2022-01-31

## 2021-09-27 RX ORDER — OMEPRAZOLE 40 MG/1
CAPSULE, DELAYED RELEASE ORAL
Qty: 90 CAPSULE | Refills: 0 | Status: SHIPPED | OUTPATIENT
Start: 2021-09-27 | End: 2021-12-20

## 2021-09-27 NOTE — TELEPHONE ENCOUNTER
Cholesterol Medication Protocol Passed 09/26/2021 07:30 PM   Protocol Details  ALT < 80    ALT resulted within past year    Lipid panel within past 12 months    Appointment within past 12 or next 3 months     Last refill of Atorvastatin - 8/11/21 - #90   L

## 2021-10-13 PROBLEM — M17.12 PRIMARY OSTEOARTHRITIS OF LEFT KNEE: Status: ACTIVE | Noted: 2021-10-13

## 2021-11-02 RX ORDER — ALPRAZOLAM 1 MG/1
0.5 TABLET ORAL 2 TIMES DAILY PRN
Qty: 4 TABLET | Refills: 0 | Status: SHIPPED | OUTPATIENT
Start: 2021-11-02 | End: 2021-11-04

## 2021-11-02 RX ORDER — SEMAGLUTIDE 1.34 MG/ML
INJECTION, SOLUTION SUBCUTANEOUS
Qty: 12 ML | Refills: 0 | Status: SHIPPED | OUTPATIENT
Start: 2021-11-02 | End: 2022-01-31

## 2021-11-02 RX ORDER — ALPRAZOLAM 0.25 MG/1
0.25 TABLET ORAL 2 TIMES DAILY PRN
Qty: 60 TABLET | Refills: 2 | Status: CANCELLED | OUTPATIENT
Start: 2021-11-02 | End: 2021-12-02

## 2021-11-02 RX ORDER — BUPROPION HYDROCHLORIDE 75 MG/1
TABLET ORAL
Qty: 180 TABLET | Refills: 0 | Status: SHIPPED | OUTPATIENT
Start: 2021-11-02

## 2021-11-02 NOTE — TELEPHONE ENCOUNTER
Altrazolam 1 mg. - Patient is going to be traveling & wanted a refill on her sedative. She hasnt renewed it for several years.   [facility]

## 2021-11-02 NOTE — TELEPHONE ENCOUNTER
Last refill: 08/11/21  Qty: 180  W/ 0 refills  Last ov : 08/26/21    Requested Prescriptions     Pending Prescriptions Disp Refills   • BUPROPION 75 MG Oral Tab [Pharmacy Med Name: BUPROPION HCL 75 MG TABLET] 180 tablet 0     Sig: TAKE 1 TABLET BY MOUTH TW

## 2021-11-02 NOTE — TELEPHONE ENCOUNTER
Last refill: 08/11/21  Qty: 12 mL  W/ 0 refills  Last ov: 08/26/21    Requested Prescriptions     Pending Prescriptions Disp Refills   • OZEMPIC, 1 MG/DOSE, 4 MG/3ML Subcutaneous Solution Pen-injector [Pharmacy Med Name: OZEMPIC 1 MG/DOSE (4 MG/3 ML)]  0

## 2021-11-02 NOTE — TELEPHONE ENCOUNTER
LOV 8/26/21    LAST LAB 8/26/21    LAST RX 8/26/21    Next OV No future appointments. PROTOCOL  Message left for her to call back to clarify current supply.

## 2021-11-12 ENCOUNTER — OFFICE VISIT (OUTPATIENT)
Dept: FAMILY MEDICINE CLINIC | Facility: CLINIC | Age: 61
End: 2021-11-12
Payer: COMMERCIAL

## 2021-11-12 VITALS
SYSTOLIC BLOOD PRESSURE: 142 MMHG | RESPIRATION RATE: 20 BRPM | HEART RATE: 108 BPM | DIASTOLIC BLOOD PRESSURE: 74 MMHG | TEMPERATURE: 98 F | OXYGEN SATURATION: 97 %

## 2021-11-12 DIAGNOSIS — T36.95XA ANTIBIOTIC-INDUCED YEAST INFECTION: ICD-10-CM

## 2021-11-12 DIAGNOSIS — J01.00 ACUTE NON-RECURRENT MAXILLARY SINUSITIS: Primary | ICD-10-CM

## 2021-11-12 DIAGNOSIS — B37.9 ANTIBIOTIC-INDUCED YEAST INFECTION: ICD-10-CM

## 2021-11-12 PROCEDURE — 3077F SYST BP >= 140 MM HG: CPT | Performed by: NURSE PRACTITIONER

## 2021-11-12 PROCEDURE — 99213 OFFICE O/P EST LOW 20 MIN: CPT | Performed by: NURSE PRACTITIONER

## 2021-11-12 PROCEDURE — 3078F DIAST BP <80 MM HG: CPT | Performed by: NURSE PRACTITIONER

## 2021-11-12 RX ORDER — FLUCONAZOLE 150 MG/1
150 TABLET ORAL ONCE
Qty: 1 TABLET | Refills: 0 | Status: SHIPPED | OUTPATIENT
Start: 2021-11-12 | End: 2021-11-12

## 2021-11-12 RX ORDER — DOXYCYCLINE HYCLATE 100 MG
100 TABLET ORAL 2 TIMES DAILY
Qty: 20 TABLET | Refills: 0 | Status: SHIPPED | OUTPATIENT
Start: 2021-11-12 | End: 2021-11-22

## 2021-11-12 NOTE — PATIENT INSTRUCTIONS
Understanding Acute Rhinosinusitis  Acute rhinosinusitis is when the lining of the inside of the nose and the sinuses becomes irritated and swollen. It is also called sinusitis, or a sinus infection.   Sinuses are air-filled spaces in the skull behind the symptoms and past health. He or she will look at your ears, nose, throat, and sinuses. Imaging tests, such as X-rays, are often not needed. It can be hard to figure out if a sinus infection is caused by a virus or bacterium.  A bacterial infection tends to directed by your healthcare provider  · Pain that gets worse  · Symptoms that don’t get better, or get worse  · New symptoms  Dante last reviewed this educational content on 6/1/2019  © 6278-1718 [employer]. All rights reserved.  This info

## 2021-11-12 NOTE — PROGRESS NOTES
CHIEF COMPLAINT:   Patient presents with:  Sinus Problem: x1 week, sore throat  Ear Pain      HPI:   Faviola Engel is a 57year old female who presents for sinus congestion for  1  weeks. Symptoms have been worsening since onset.  Sinus congestion/pain 500 MG Oral Tab Take 1,000 mg by mouth one time. • Levocetirizine Dihydrochloride (XYZAL ALLERGY 24HR) 5 MG Oral Tab Take 5 mg by mouth every evening.      • Continuous Blood Gluc  ([employer] 14 DAY READER) Does not apply Device 1 Device b Breast Cancer Maternal Grandmother 79        dx age 76s      Social History    Tobacco Use      Smoking status: Never Smoker      Smokeless tobacco: Never Used    Vaping Use      Vaping Use: Never used    Alcohol use: No      Alcohol/week: 0.0 standard dri rest.  Comfort care as described in Patient Instructions. Follow up with PCP in 2-3 days if no improvement, sooner if worsening. If any difficulty breathing, SOB, or wheezing seek emergent care. Pt. Declines covid testing at this time.        Meds & Refills allergies  · Have a weak immune system  · Have dental disease     Symptoms of acute rhinosinusitis  Symptoms of acute rhinosinusitis often last around 7 to 10 days. If you have a bacterial infection, they may last longer.  They may also get better but then Decongestants, antihistamines, and other nasal sprays may give short-term relief. They may help with congestion.  Talk with your healthcare provider before taking these medicines.     Preventing acute rhinosinusitis  You can help prevent a sinus infection w

## 2021-12-16 ENCOUNTER — OFFICE VISIT (OUTPATIENT)
Dept: FAMILY MEDICINE CLINIC | Facility: CLINIC | Age: 61
End: 2021-12-16
Payer: COMMERCIAL

## 2021-12-16 VITALS
TEMPERATURE: 98 F | SYSTOLIC BLOOD PRESSURE: 136 MMHG | OXYGEN SATURATION: 96 % | DIASTOLIC BLOOD PRESSURE: 70 MMHG | HEART RATE: 100 BPM

## 2021-12-16 DIAGNOSIS — J30.1 NON-SEASONAL ALLERGIC RHINITIS DUE TO POLLEN: ICD-10-CM

## 2021-12-16 DIAGNOSIS — J01.00 ACUTE NON-RECURRENT MAXILLARY SINUSITIS: Primary | ICD-10-CM

## 2021-12-16 PROCEDURE — 99213 OFFICE O/P EST LOW 20 MIN: CPT | Performed by: FAMILY MEDICINE

## 2021-12-16 PROCEDURE — 3078F DIAST BP <80 MM HG: CPT | Performed by: FAMILY MEDICINE

## 2021-12-16 PROCEDURE — 3075F SYST BP GE 130 - 139MM HG: CPT | Performed by: FAMILY MEDICINE

## 2021-12-16 RX ORDER — PREDNISONE 20 MG/1
40 TABLET ORAL DAILY
Qty: 10 TABLET | Refills: 0 | Status: SHIPPED | OUTPATIENT
Start: 2021-12-16 | End: 2021-12-21

## 2021-12-16 RX ORDER — DOXYCYCLINE 100 MG/1
100 CAPSULE ORAL 2 TIMES DAILY
Qty: 20 CAPSULE | Refills: 0 | Status: SHIPPED | OUTPATIENT
Start: 2021-12-16

## 2021-12-16 NOTE — PROGRESS NOTES
Subjective:   Patient ID: Bhupendra Shah is a 57year old female. Patient for 10 days has had head congestion, sinus pressure. Positive postnasal drip. Color change to mucus from nose. Positive cough. Positive fatigue.   Without fever, chills, sore Sensor ([address] [facility]) Does not apply Misc 3 each by Does not apply route every 30 (thirty) days. 3 each 11   • Multiple Vitamins-Minerals (CENTRUM SILVER) Oral Tab Take 1 tablet by mouth daily.      • Saccharomyces boulardii (FLORASTOR OR) by mouth daily for 5 days.        Imaging & Referrals:  None

## 2021-12-20 DIAGNOSIS — I10 ESSENTIAL HYPERTENSION, BENIGN: ICD-10-CM

## 2021-12-20 DIAGNOSIS — E11.65 UNCONTROLLED TYPE 2 DIABETES MELLITUS WITH STAGE 2 CHRONIC KIDNEY DISEASE, WITHOUT LONG-TERM CURRENT USE OF INSULIN (HCC): ICD-10-CM

## 2021-12-20 DIAGNOSIS — N18.2 UNCONTROLLED TYPE 2 DIABETES MELLITUS WITH STAGE 2 CHRONIC KIDNEY DISEASE, WITHOUT LONG-TERM CURRENT USE OF INSULIN (HCC): ICD-10-CM

## 2021-12-20 DIAGNOSIS — E78.5 HYPERLIPIDEMIA ASSOCIATED WITH TYPE 2 DIABETES MELLITUS (HCC): ICD-10-CM

## 2021-12-20 DIAGNOSIS — E11.69 HYPERLIPIDEMIA ASSOCIATED WITH TYPE 2 DIABETES MELLITUS (HCC): ICD-10-CM

## 2021-12-20 DIAGNOSIS — E11.22 UNCONTROLLED TYPE 2 DIABETES MELLITUS WITH STAGE 2 CHRONIC KIDNEY DISEASE, WITHOUT LONG-TERM CURRENT USE OF INSULIN (HCC): ICD-10-CM

## 2021-12-20 RX ORDER — METFORMIN HYDROCHLORIDE 750 MG/1
TABLET, EXTENDED RELEASE ORAL
Qty: 90 TABLET | Refills: 0 | Status: SHIPPED | OUTPATIENT
Start: 2021-12-20

## 2021-12-20 RX ORDER — GLIPIZIDE 10 MG/1
10 TABLET, FILM COATED, EXTENDED RELEASE ORAL
Qty: 180 TABLET | Refills: 0 | Status: SHIPPED | OUTPATIENT
Start: 2021-12-20

## 2021-12-20 RX ORDER — DAPAGLIFLOZIN 10 MG/1
TABLET, FILM COATED ORAL
Qty: 90 TABLET | Refills: 0 | Status: SHIPPED | OUTPATIENT
Start: 2021-12-20

## 2021-12-20 RX ORDER — OMEPRAZOLE 40 MG/1
CAPSULE, DELAYED RELEASE ORAL
Qty: 90 CAPSULE | Refills: 0 | Status: SHIPPED | OUTPATIENT
Start: 2021-12-20

## 2021-12-20 NOTE — TELEPHONE ENCOUNTER
Requested Prescriptions     Pending Prescriptions Disp Refills   • FARXIGA 10 MG Oral Tab [Pharmacy Med Name: FARXIGA 10 MG TABLET] 90 tablet 0     Sig: TAKE 1 TABLET BY MOUTH EVERY DAY   • GLIPIZIDE 10 MG Oral Tablet 24 Hr [Pharmacy Med Name: GLIPIZIDE ER

## 2022-01-10 ENCOUNTER — TELEPHONE (OUTPATIENT)
Dept: FAMILY MEDICINE CLINIC | Facility: CLINIC | Age: 62
End: 2022-01-10

## 2022-01-10 DIAGNOSIS — E11.69 HYPERLIPIDEMIA ASSOCIATED WITH TYPE 2 DIABETES MELLITUS (HCC): ICD-10-CM

## 2022-01-10 DIAGNOSIS — E11.65 UNCONTROLLED TYPE 2 DIABETES MELLITUS WITH STAGE 2 CHRONIC KIDNEY DISEASE, WITHOUT LONG-TERM CURRENT USE OF INSULIN (HCC): ICD-10-CM

## 2022-01-10 DIAGNOSIS — N18.2 UNCONTROLLED TYPE 2 DIABETES MELLITUS WITH STAGE 2 CHRONIC KIDNEY DISEASE, WITHOUT LONG-TERM CURRENT USE OF INSULIN (HCC): ICD-10-CM

## 2022-01-10 DIAGNOSIS — I10 ESSENTIAL HYPERTENSION, BENIGN: ICD-10-CM

## 2022-01-10 DIAGNOSIS — E11.22 UNCONTROLLED TYPE 2 DIABETES MELLITUS WITH STAGE 2 CHRONIC KIDNEY DISEASE, WITHOUT LONG-TERM CURRENT USE OF INSULIN (HCC): ICD-10-CM

## 2022-01-10 DIAGNOSIS — E78.5 HYPERLIPIDEMIA ASSOCIATED WITH TYPE 2 DIABETES MELLITUS (HCC): ICD-10-CM

## 2022-01-10 RX ORDER — LOSARTAN POTASSIUM AND HYDROCHLOROTHIAZIDE 12.5; 5 MG/1; MG/1
TABLET ORAL
Qty: 90 TABLET | Refills: 1 | Status: SHIPPED | OUTPATIENT
Start: 2022-01-10

## 2022-01-10 NOTE — TELEPHONE ENCOUNTER
Last refill: 07/06/21  Qty: 90  W/ 1 refills  Last ov:  08/26/21    Requested Prescriptions     Pending Prescriptions Disp Refills   • LOSARTAN-HYDROCHLOROTHIAZIDE 50-12.5 MG Oral Tab [Pharmacy Med Name: LOSARTAN-HCTZ 50-12.5 MG TAB] 90 tablet 1     Sig: T

## 2022-01-28 DIAGNOSIS — E78.5 HYPERLIPIDEMIA ASSOCIATED WITH TYPE 2 DIABETES MELLITUS: ICD-10-CM

## 2022-01-28 DIAGNOSIS — E11.69 HYPERLIPIDEMIA ASSOCIATED WITH TYPE 2 DIABETES MELLITUS: ICD-10-CM

## 2022-01-28 DIAGNOSIS — I10 ESSENTIAL HYPERTENSION, BENIGN: ICD-10-CM

## 2022-01-28 DIAGNOSIS — IMO0002 UNCONTROLLED TYPE 2 DIABETES MELLITUS WITH STAGE 2 CHRONIC KIDNEY DISEASE, WITHOUT LONG-TERM CURRENT USE OF INSULIN: Primary | ICD-10-CM

## 2022-01-31 RX ORDER — SEMAGLUTIDE 1.34 MG/ML
INJECTION, SOLUTION SUBCUTANEOUS
Qty: 3 ML | Refills: 1 | Status: SHIPPED | OUTPATIENT
Start: 2022-01-31 | End: 2022-03-07

## 2022-01-31 RX ORDER — ATORVASTATIN CALCIUM 40 MG/1
TABLET, FILM COATED ORAL
Qty: 30 TABLET | Refills: 0 | Status: SHIPPED | OUTPATIENT
Start: 2022-01-31 | End: 2022-03-01

## 2022-01-31 NOTE — TELEPHONE ENCOUNTER
Date of Service: 01/19/2022    COMPREHENSIVE MEDICAL NEUROPSYCHIATRIC 30-MINUTE TELEPHONIC FOLLOWUP EVALUATION    SERVICE CODE:   [id number].      ENCOUNTER DIAGNOSIS:   F20.0,  chronic paranoid schizophrenia.     Pawan is very pleasant and interactive and in this consultation session.  He reports significant episodic non-command, denigratory auditory hallucinosis and paranoid ideation with thoughts of being harassed and disrespected by multiple vague personages.      MENTAL STATUS EXAMINATION:   Presently reveals no signs of thought disorder or psychosis nor of organic mental disease.  Mood is euthymic and well modulated. Affect is appropriate to content. Pawan denies suicidal or homicidal and all other violent ideation  on exam.      A 14 point medical review of systems is negative or noncontributory aside from that described above.    Alcohol and other drug abuse are denied.  Pawan is provided a comprehensive medical neuropsychiatric medication regimen as thoroughly reviewed, discussed and adjusted and prescribed as per Epic.     CONTINUE ACTIVE CARE AND TREATMENT PLAN:    Return to clinic for followup.        Dictated By: Chuy Arrington MD  Signing Provider: MD DYLAN De Paz/josé miguel ([id number])   DD: 01/27/2022 11:35:47 AM TD: 01/31/2022 4:40:22 AM       TULIO- she will be faxing [facility] paperwork today or tomorrow for  to complete

## 2022-03-01 RX ORDER — ATORVASTATIN CALCIUM 40 MG/1
TABLET, FILM COATED ORAL
Qty: 30 TABLET | Refills: 0 | Status: SHIPPED | OUTPATIENT
Start: 2022-03-01 | End: 2022-03-05

## 2022-03-05 RX ORDER — ATORVASTATIN CALCIUM 40 MG/1
40 TABLET, FILM COATED ORAL NIGHTLY
Qty: 30 TABLET | Refills: 0 | Status: SHIPPED | OUTPATIENT
Start: 2022-03-05 | End: 2022-03-07

## 2022-03-07 ENCOUNTER — OFFICE VISIT (OUTPATIENT)
Dept: FAMILY MEDICINE CLINIC | Facility: CLINIC | Age: 62
End: 2022-03-07
Payer: COMMERCIAL

## 2022-03-07 VITALS
SYSTOLIC BLOOD PRESSURE: 130 MMHG | OXYGEN SATURATION: 97 % | HEART RATE: 103 BPM | BODY MASS INDEX: 28.85 KG/M2 | DIASTOLIC BLOOD PRESSURE: 78 MMHG | RESPIRATION RATE: 18 BRPM | TEMPERATURE: 97 F | WEIGHT: 179.5 LBS | HEIGHT: 66 IN

## 2022-03-07 DIAGNOSIS — Z13.0 SCREENING, ANEMIA, DEFICIENCY, IRON: Primary | ICD-10-CM

## 2022-03-07 DIAGNOSIS — I10 PRIMARY HYPERTENSION: ICD-10-CM

## 2022-03-07 DIAGNOSIS — E11.69 HYPERLIPIDEMIA ASSOCIATED WITH TYPE 2 DIABETES MELLITUS (HCC): ICD-10-CM

## 2022-03-07 DIAGNOSIS — E78.5 HYPERLIPIDEMIA ASSOCIATED WITH TYPE 2 DIABETES MELLITUS (HCC): ICD-10-CM

## 2022-03-07 DIAGNOSIS — Z76.0 ENCOUNTER FOR MEDICATION REFILL: ICD-10-CM

## 2022-03-07 DIAGNOSIS — IMO0002 UNCONTROLLED TYPE 2 DIABETES MELLITUS WITH STAGE 2 CHRONIC KIDNEY DISEASE, WITHOUT LONG-TERM CURRENT USE OF INSULIN: ICD-10-CM

## 2022-03-07 PROCEDURE — 3078F DIAST BP <80 MM HG: CPT | Performed by: FAMILY MEDICINE

## 2022-03-07 PROCEDURE — 3075F SYST BP GE 130 - 139MM HG: CPT | Performed by: FAMILY MEDICINE

## 2022-03-07 PROCEDURE — 3008F BODY MASS INDEX DOCD: CPT | Performed by: FAMILY MEDICINE

## 2022-03-07 PROCEDURE — 99214 OFFICE O/P EST MOD 30 MIN: CPT | Performed by: FAMILY MEDICINE

## 2022-03-07 RX ORDER — ATORVASTATIN CALCIUM 40 MG/1
40 TABLET, FILM COATED ORAL NIGHTLY
Qty: 90 TABLET | Refills: 1 | Status: SHIPPED | OUTPATIENT
Start: 2022-03-07 | End: 2022-09-03

## 2022-03-07 RX ORDER — SEMAGLUTIDE 1.34 MG/ML
1 INJECTION, SOLUTION SUBCUTANEOUS
Qty: 3 ML | Refills: 2 | Status: SHIPPED | OUTPATIENT
Start: 2022-03-07 | End: 2022-06-05

## 2022-03-17 RX ORDER — OMEPRAZOLE 40 MG/1
CAPSULE, DELAYED RELEASE ORAL
Qty: 90 CAPSULE | Refills: 0 | Status: SHIPPED | OUTPATIENT
Start: 2022-03-17

## 2022-03-17 RX ORDER — GLIPIZIDE 10 MG/1
TABLET, FILM COATED, EXTENDED RELEASE ORAL
Qty: 180 TABLET | Refills: 0 | Status: SHIPPED | OUTPATIENT
Start: 2022-03-17

## 2022-03-17 RX ORDER — METFORMIN HYDROCHLORIDE 750 MG/1
TABLET, EXTENDED RELEASE ORAL
Qty: 90 TABLET | Refills: 0 | Status: SHIPPED | OUTPATIENT
Start: 2022-03-17

## 2022-03-17 RX ORDER — DAPAGLIFLOZIN 10 MG/1
TABLET, FILM COATED ORAL
Qty: 90 TABLET | Refills: 0 | Status: SHIPPED | OUTPATIENT
Start: 2022-03-17

## 2022-03-17 NOTE — TELEPHONE ENCOUNTER
Diabetic Medication Protocol Failed 03/17/2022 09:05 AM   Protocol Details  HgBA1C procedure resulted in past 6 months    Last HgBA1C < 7.5    Microalbumin procedure in past 12 months or taking ACE/ARB    Appointment in past 6 or next 3 months     Last OV 3/7/22  Last lab 8/26/21  A1c 7.9  4 meds last refilled 12/20/21  3 months 0 refills

## 2022-03-25 ENCOUNTER — OFFICE VISIT (OUTPATIENT)
Dept: FAMILY MEDICINE CLINIC | Facility: CLINIC | Age: 62
End: 2022-03-25
Payer: COMMERCIAL

## 2022-03-25 VITALS
SYSTOLIC BLOOD PRESSURE: 142 MMHG | OXYGEN SATURATION: 97 % | WEIGHT: 178 LBS | BODY MASS INDEX: 28.61 KG/M2 | TEMPERATURE: 97 F | HEIGHT: 66 IN | RESPIRATION RATE: 17 BRPM | HEART RATE: 119 BPM | DIASTOLIC BLOOD PRESSURE: 82 MMHG

## 2022-03-25 DIAGNOSIS — J01.10 ACUTE NON-RECURRENT FRONTAL SINUSITIS: Primary | ICD-10-CM

## 2022-03-25 PROCEDURE — 3079F DIAST BP 80-89 MM HG: CPT | Performed by: PHYSICIAN ASSISTANT

## 2022-03-25 PROCEDURE — 99213 OFFICE O/P EST LOW 20 MIN: CPT | Performed by: PHYSICIAN ASSISTANT

## 2022-03-25 PROCEDURE — 3077F SYST BP >= 140 MM HG: CPT | Performed by: PHYSICIAN ASSISTANT

## 2022-03-25 PROCEDURE — 3008F BODY MASS INDEX DOCD: CPT | Performed by: PHYSICIAN ASSISTANT

## 2022-03-25 RX ORDER — DOXYCYCLINE HYCLATE 100 MG
100 TABLET ORAL 2 TIMES DAILY
Qty: 20 TABLET | Refills: 0 | Status: SHIPPED | OUTPATIENT
Start: 2022-03-25 | End: 2022-04-04

## 2022-03-25 RX ORDER — PREDNISONE 10 MG/1
10 TABLET ORAL DAILY
Qty: 3 TABLET | Refills: 0 | Status: SHIPPED | OUTPATIENT
Start: 2022-03-25 | End: 2022-03-28

## 2022-04-07 PROCEDURE — 3051F HG A1C>EQUAL 7.0%<8.0%: CPT | Performed by: PHYSICIAN ASSISTANT

## 2022-04-08 ENCOUNTER — TELEPHONE (OUTPATIENT)
Dept: FAMILY MEDICINE CLINIC | Facility: CLINIC | Age: 62
End: 2022-04-08

## 2022-04-08 LAB
ABSOLUTE BASOPHILS: 72 CELLS/UL (ref 0–200)
ABSOLUTE EOSINOPHILS: 228 CELLS/UL (ref 15–500)
ABSOLUTE LYMPHOCYTES: 2587 CELLS/UL (ref 850–3900)
ABSOLUTE MONOCYTES: 611 CELLS/UL (ref 200–950)
ABSOLUTE NEUTROPHILS: 3003 CELLS/UL (ref 1500–7800)
ALBUMIN/GLOBULIN RATIO: 2 (CALC) (ref 1–2.5)
ALBUMIN: 4.3 G/DL (ref 3.6–5.1)
ALKALINE PHOSPHATASE: 93 U/L (ref 37–153)
ALT: 33 U/L (ref 6–29)
AST: 22 U/L (ref 10–35)
BASOPHILS: 1.1 %
BILIRUBIN, TOTAL: 0.5 MG/DL (ref 0.2–1.2)
BUN: 16 MG/DL (ref 7–25)
CALCIUM: 9.8 MG/DL (ref 8.6–10.4)
CARBON DIOXIDE: 28 MMOL/L (ref 20–32)
CHLORIDE: 103 MMOL/L (ref 98–110)
CHOL/HDLC RATIO: 3.8 (CALC)
CHOLESTEROL, TOTAL: 180 MG/DL
CREATININE: 0.76 MG/DL (ref 0.5–0.99)
EGFR IF AFRICN AM: 97 ML/MIN/1.73M2
EGFR IF NONAFRICN AM: 84 ML/MIN/1.73M2
EOSINOPHILS: 3.5 %
GLOBULIN: 2.1 G/DL (CALC) (ref 1.9–3.7)
GLUCOSE: 143 MG/DL (ref 65–99)
HDL CHOLESTEROL: 48 MG/DL
HEMATOCRIT: 43 % (ref 35–45)
HEMOGLOBIN A1C: 7.7 % OF TOTAL HGB
HEMOGLOBIN: 14.4 G/DL (ref 11.7–15.5)
LDL-CHOLESTEROL: 98 MG/DL (CALC)
LYMPHOCYTES: 39.8 %
MCH: 28.1 PG (ref 27–33)
MCHC: 33.5 G/DL (ref 32–36)
MCV: 84 FL (ref 80–100)
MONOCYTES: 9.4 %
MPV: 9.8 FL (ref 7.5–12.5)
NEUTROPHILS: 46.2 %
NON-HDL CHOLESTEROL: 132 MG/DL (CALC)
PLATELET COUNT: 272 THOUSAND/UL (ref 140–400)
POTASSIUM: 3.8 MMOL/L (ref 3.5–5.3)
PROTEIN, TOTAL: 6.4 G/DL (ref 6.1–8.1)
RDW: 13.2 % (ref 11–15)
RED BLOOD CELL COUNT: 5.12 MILLION/UL (ref 3.8–5.1)
SODIUM: 141 MMOL/L (ref 135–146)
TRIGLYCERIDES: 225 MG/DL
WHITE BLOOD CELL COUNT: 6.5 THOUSAND/UL (ref 3.8–10.8)

## 2022-04-08 NOTE — TELEPHONE ENCOUNTER
Last refill on 01/10/2022, for #90 tabs, with 1 refills  LOSARTAN-HYDROCHLOROTHIAZIDE 50-12.5 MG Oral Tab    Refill too soon    Left message to voicemail (per verbal release form consent, no identifying message to confirm.) Patient advised to call office back [phone] - need to advise pt refill available at pharmacy.

## 2022-04-08 NOTE — TELEPHONE ENCOUNTER
Patient advised refill available at pharmacy. Patient verbalized understanding. No further questions at this time.

## 2022-04-11 ENCOUNTER — TELEPHONE (OUTPATIENT)
Dept: FAMILY MEDICINE CLINIC | Facility: CLINIC | Age: 62
End: 2022-04-11

## 2022-04-11 NOTE — TELEPHONE ENCOUNTER
Bal Harrison notified of results and recommendations listed below and verbalized understanding. No further questions at this time. Reports has a diabetic eye exam upcoming in June.

## 2022-04-11 NOTE — TELEPHONE ENCOUNTER
----- Message from Pravin Casey MD sent at 4/9/2022  3:13 PM CDT -----  Labs look overall good. A1C - improving, ideal <7, but 7.7 is a phenomenal improvement. Triglycerides elevated. Glucose well controlled and on cholesterol medication (Atorvastatin 40 mg), and yet Trigs are elevated. Changes to her diet ? More meats / saturated fats     At this time, due to increased risk of CAD, Rx Omega 3 strongly recommended - sent to the pharmacy. Additionally need Diabetes eye exam. If not done already please provide referral if needed. If done please obtain results and update care gaps.      Thank you so much

## 2022-04-13 ENCOUNTER — HOSPITAL ENCOUNTER (INPATIENT)
Facility: HOSPITAL | Age: 62
LOS: 2 days | Discharge: HOME | End: 2022-04-17
Attending: EMERGENCY MEDICINE | Admitting: HOSPITALIST
Payer: COMMERCIAL

## 2022-04-13 ENCOUNTER — OFFICE VISIT (OUTPATIENT)
Dept: FAMILY MEDICINE CLINIC | Facility: CLINIC | Age: 62
End: 2022-04-13
Payer: COMMERCIAL

## 2022-04-13 VITALS
DIASTOLIC BLOOD PRESSURE: 74 MMHG | OXYGEN SATURATION: 97 % | RESPIRATION RATE: 16 BRPM | WEIGHT: 178 LBS | HEART RATE: 117 BPM | SYSTOLIC BLOOD PRESSURE: 130 MMHG | BODY MASS INDEX: 29 KG/M2 | TEMPERATURE: 98 F

## 2022-04-13 DIAGNOSIS — N39.0 URINARY TRACT INFECTION WITHOUT HEMATURIA, SITE UNSPECIFIED: Primary | ICD-10-CM

## 2022-04-13 DIAGNOSIS — R51.9 SINUS HEADACHE: Primary | ICD-10-CM

## 2022-04-13 DIAGNOSIS — K81.0 ACUTE CHOLECYSTITIS: ICD-10-CM

## 2022-04-13 DIAGNOSIS — R74.8 ELEVATED LIVER ENZYMES: ICD-10-CM

## 2022-04-13 LAB
BASOPHILS # BLD AUTO: 0.06 X10(3) UL (ref 0–0.2)
BASOPHILS NFR BLD AUTO: 0.7 %
BILIRUB UR QL STRIP.AUTO: NEGATIVE
CLARITY UR REFRACT.AUTO: CLEAR
COLOR UR AUTO: YELLOW
EOSINOPHIL # BLD AUTO: 0.09 X10(3) UL (ref 0–0.7)
EOSINOPHIL NFR BLD AUTO: 1 %
ERYTHROCYTE [DISTWIDTH] IN BLOOD BY AUTOMATED COUNT: 13.9 %
GLUCOSE UR STRIP.AUTO-MCNC: >=500 MG/DL
HCT VFR BLD AUTO: 47 %
HGB BLD-MCNC: 15.7 G/DL
IMM GRANULOCYTES # BLD AUTO: 0.02 X10(3) UL (ref 0–1)
IMM GRANULOCYTES NFR BLD: 0.2 %
KETONES UR STRIP.AUTO-MCNC: 20 MG/DL
LYMPHOCYTES # BLD AUTO: 1.2 X10(3) UL (ref 1–4)
LYMPHOCYTES NFR BLD AUTO: 13.4 %
MCH RBC QN AUTO: 28.2 PG (ref 26–34)
MCHC RBC AUTO-ENTMCNC: 33.4 G/DL (ref 31–37)
MCV RBC AUTO: 84.4 FL
MONOCYTES # BLD AUTO: 0.72 X10(3) UL (ref 0.1–1)
MONOCYTES NFR BLD AUTO: 8.1 %
NEUTROPHILS # BLD AUTO: 6.85 X10 (3) UL (ref 1.5–7.7)
NEUTROPHILS # BLD AUTO: 6.85 X10(3) UL (ref 1.5–7.7)
NEUTROPHILS NFR BLD AUTO: 76.6 %
NITRITE UR QL STRIP.AUTO: NEGATIVE
OPERATOR ID: NORMAL
PH UR STRIP.AUTO: 5 [PH] (ref 5–8)
PLATELET # BLD AUTO: 240 10(3)UL (ref 150–450)
POCT LOT NUMBER: NORMAL
PROT UR STRIP.AUTO-MCNC: 100 MG/DL
RAPID SARS-COV-2 BY PCR: NOT DETECTED
RBC # BLD AUTO: 5.57 X10(6)UL
RBC UR QL AUTO: NEGATIVE
SP GR UR STRIP.AUTO: >1.03 (ref 1–1.03)
UROBILINOGEN UR STRIP.AUTO-MCNC: <2 MG/DL
WBC # BLD AUTO: 8.9 X10(3) UL (ref 4–11)

## 2022-04-13 PROCEDURE — U0002 COVID-19 LAB TEST NON-CDC: HCPCS | Performed by: PHYSICIAN ASSISTANT

## 2022-04-13 PROCEDURE — 3078F DIAST BP <80 MM HG: CPT | Performed by: PHYSICIAN ASSISTANT

## 2022-04-13 PROCEDURE — 99213 OFFICE O/P EST LOW 20 MIN: CPT | Performed by: PHYSICIAN ASSISTANT

## 2022-04-13 PROCEDURE — 3075F SYST BP GE 130 - 139MM HG: CPT | Performed by: PHYSICIAN ASSISTANT

## 2022-04-13 RX ORDER — ONDANSETRON 2 MG/ML
4 INJECTION INTRAMUSCULAR; INTRAVENOUS ONCE
Status: COMPLETED | OUTPATIENT
Start: 2022-04-13 | End: 2022-04-13

## 2022-04-13 RX ORDER — MORPHINE SULFATE 4 MG/ML
4 INJECTION, SOLUTION INTRAMUSCULAR; INTRAVENOUS EVERY 30 MIN PRN
Status: DISCONTINUED | OUTPATIENT
Start: 2022-04-13 | End: 2022-04-15 | Stop reason: ALTCHOICE

## 2022-04-14 ENCOUNTER — APPOINTMENT (OUTPATIENT)
Dept: MRI IMAGING | Facility: HOSPITAL | Age: 62
End: 2022-04-14
Attending: INTERNAL MEDICINE
Payer: COMMERCIAL

## 2022-04-14 ENCOUNTER — APPOINTMENT (OUTPATIENT)
Dept: CT IMAGING | Facility: HOSPITAL | Age: 62
End: 2022-04-14
Attending: EMERGENCY MEDICINE
Payer: COMMERCIAL

## 2022-04-14 PROBLEM — N39.0 URINARY TRACT INFECTION WITHOUT HEMATURIA: Status: ACTIVE | Noted: 2022-04-14

## 2022-04-14 PROBLEM — R74.8 ELEVATED LIVER ENZYMES: Status: ACTIVE | Noted: 2022-04-14

## 2022-04-14 PROBLEM — N39.0 URINARY TRACT INFECTION WITHOUT HEMATURIA, SITE UNSPECIFIED: Status: ACTIVE | Noted: 2022-04-14

## 2022-04-14 LAB
ALBUMIN SERPL-MCNC: 3.4 G/DL (ref 3.4–5)
ALBUMIN SERPL-MCNC: 3.7 G/DL (ref 3.4–5)
ALBUMIN/GLOB SERPL: 0.9 {RATIO} (ref 1–2)
ALBUMIN/GLOB SERPL: 0.9 {RATIO} (ref 1–2)
ALP LIVER SERPL-CCNC: 234 U/L
ALP LIVER SERPL-CCNC: 274 U/L
ALT SERPL-CCNC: 1155 U/L
ALT SERPL-CCNC: 913 U/L
ANION GAP SERPL CALC-SCNC: 8 MMOL/L (ref 0–18)
ANION GAP SERPL CALC-SCNC: 9 MMOL/L (ref 0–18)
AST SERPL-CCNC: 107 U/L (ref 15–37)
AST SERPL-CCNC: 157 U/L (ref 15–37)
ATRIAL RATE: 98 BPM
BASOPHILS # BLD AUTO: 0.05 X10(3) UL (ref 0–0.2)
BASOPHILS NFR BLD AUTO: 0.6 %
BILIRUB SERPL-MCNC: 0.5 MG/DL (ref 0.1–2)
BILIRUB SERPL-MCNC: 0.8 MG/DL (ref 0.1–2)
BUN BLD-MCNC: 15 MG/DL (ref 7–18)
BUN BLD-MCNC: 17 MG/DL (ref 7–18)
CALCIUM BLD-MCNC: 9.2 MG/DL (ref 8.5–10.1)
CALCIUM BLD-MCNC: 9.8 MG/DL (ref 8.5–10.1)
CHLORIDE SERPL-SCNC: 104 MMOL/L (ref 98–112)
CHLORIDE SERPL-SCNC: 106 MMOL/L (ref 98–112)
CO2 SERPL-SCNC: 24 MMOL/L (ref 21–32)
CO2 SERPL-SCNC: 25 MMOL/L (ref 21–32)
CREAT BLD-MCNC: 0.67 MG/DL
CREAT BLD-MCNC: 1.08 MG/DL
EOSINOPHIL # BLD AUTO: 0.11 X10(3) UL (ref 0–0.7)
EOSINOPHIL NFR BLD AUTO: 1.4 %
ERYTHROCYTE [DISTWIDTH] IN BLOOD BY AUTOMATED COUNT: 13.6 %
GLOBULIN PLAS-MCNC: 3.6 G/DL (ref 2.8–4.4)
GLOBULIN PLAS-MCNC: 4.3 G/DL (ref 2.8–4.4)
GLUCOSE BLD-MCNC: 133 MG/DL (ref 70–99)
GLUCOSE BLD-MCNC: 138 MG/DL (ref 70–99)
GLUCOSE BLD-MCNC: 141 MG/DL (ref 70–99)
GLUCOSE BLD-MCNC: 144 MG/DL (ref 70–99)
GLUCOSE BLD-MCNC: 155 MG/DL (ref 70–99)
GLUCOSE BLD-MCNC: 221 MG/DL (ref 70–99)
GLUCOSE BLD-MCNC: 283 MG/DL (ref 70–99)
HCT VFR BLD AUTO: 44 %
HGB BLD-MCNC: 14.5 G/DL
IMM GRANULOCYTES # BLD AUTO: 0.04 X10(3) UL (ref 0–1)
IMM GRANULOCYTES NFR BLD: 0.5 %
INR BLD: 1.03 (ref 0.8–1.2)
LIPASE SERPL-CCNC: 167 U/L (ref 73–393)
LYMPHOCYTES # BLD AUTO: 1.27 X10(3) UL (ref 1–4)
LYMPHOCYTES NFR BLD AUTO: 15.6 %
MCH RBC QN AUTO: 28 PG (ref 26–34)
MCHC RBC AUTO-ENTMCNC: 33 G/DL (ref 31–37)
MCV RBC AUTO: 85.1 FL
MONOCYTES # BLD AUTO: 0.87 X10(3) UL (ref 0.1–1)
MONOCYTES NFR BLD AUTO: 10.7 %
NEUTROPHILS # BLD AUTO: 5.8 X10 (3) UL (ref 1.5–7.7)
NEUTROPHILS # BLD AUTO: 5.8 X10(3) UL (ref 1.5–7.7)
NEUTROPHILS NFR BLD AUTO: 71.2 %
OSMOLALITY SERPL CALC.SUM OF ELEC: 291 MOSM/KG (ref 275–295)
OSMOLALITY SERPL CALC.SUM OF ELEC: 296 MOSM/KG (ref 275–295)
P AXIS: 32 DEGREES
P-R INTERVAL: 142 MS
PLATELET # BLD AUTO: 218 10(3)UL (ref 150–450)
POTASSIUM SERPL-SCNC: 3.5 MMOL/L (ref 3.5–5.1)
POTASSIUM SERPL-SCNC: 4.2 MMOL/L (ref 3.5–5.1)
PROT SERPL-MCNC: 7 G/DL (ref 6.4–8.2)
PROT SERPL-MCNC: 8 G/DL (ref 6.4–8.2)
PROTHROMBIN TIME: 13.5 SECONDS (ref 11.6–14.8)
Q-T INTERVAL: 366 MS
QRS DURATION: 86 MS
QTC CALCULATION (BEZET): 467 MS
R AXIS: -3 DEGREES
RBC # BLD AUTO: 5.17 X10(6)UL
SODIUM SERPL-SCNC: 137 MMOL/L (ref 136–145)
SODIUM SERPL-SCNC: 139 MMOL/L (ref 136–145)
T AXIS: 45 DEGREES
TROPONIN I HIGH SENSITIVITY: <3 NG/L
VENTRICULAR RATE: 98 BPM
WBC # BLD AUTO: 8.1 X10(3) UL (ref 4–11)

## 2022-04-14 PROCEDURE — 74181 MRI ABDOMEN W/O CONTRAST: CPT | Performed by: INTERNAL MEDICINE

## 2022-04-14 PROCEDURE — 74177 CT ABD & PELVIS W/CONTRAST: CPT | Performed by: EMERGENCY MEDICINE

## 2022-04-14 PROCEDURE — 76376 3D RENDER W/INTRP POSTPROCES: CPT | Performed by: INTERNAL MEDICINE

## 2022-04-14 PROCEDURE — 99223 1ST HOSP IP/OBS HIGH 75: CPT | Performed by: INTERNAL MEDICINE

## 2022-04-14 RX ORDER — PROCHLORPERAZINE EDISYLATE 5 MG/ML
5 INJECTION INTRAMUSCULAR; INTRAVENOUS EVERY 8 HOURS PRN
Status: DISCONTINUED | OUTPATIENT
Start: 2022-04-14 | End: 2022-04-17

## 2022-04-14 RX ORDER — SODIUM CHLORIDE, SODIUM LACTATE, POTASSIUM CHLORIDE, CALCIUM CHLORIDE 600; 310; 30; 20 MG/100ML; MG/100ML; MG/100ML; MG/100ML
INJECTION, SOLUTION INTRAVENOUS CONTINUOUS
Status: ACTIVE | OUTPATIENT
Start: 2022-04-14 | End: 2022-04-14

## 2022-04-14 RX ORDER — ENOXAPARIN SODIUM 100 MG/ML
40 INJECTION SUBCUTANEOUS DAILY
Status: DISCONTINUED | OUTPATIENT
Start: 2022-04-14 | End: 2022-04-17

## 2022-04-14 RX ORDER — BISACODYL 10 MG
10 SUPPOSITORY, RECTAL RECTAL
Status: DISCONTINUED | OUTPATIENT
Start: 2022-04-14 | End: 2022-04-17

## 2022-04-14 RX ORDER — SENNOSIDES 8.6 MG
17.2 TABLET ORAL NIGHTLY PRN
Status: DISCONTINUED | OUTPATIENT
Start: 2022-04-14 | End: 2022-04-17

## 2022-04-14 RX ORDER — IOHEXOL 350 MG/ML
100 INJECTION, SOLUTION INTRAVENOUS
Status: COMPLETED | OUTPATIENT
Start: 2022-04-14 | End: 2022-04-14

## 2022-04-14 RX ORDER — ONDANSETRON 2 MG/ML
4 INJECTION INTRAMUSCULAR; INTRAVENOUS EVERY 6 HOURS PRN
Status: DISCONTINUED | OUTPATIENT
Start: 2022-04-14 | End: 2022-04-17

## 2022-04-14 RX ORDER — KETOROLAC TROMETHAMINE 30 MG/ML
30 INJECTION, SOLUTION INTRAMUSCULAR; INTRAVENOUS EVERY 6 HOURS PRN
Status: DISPENSED | OUTPATIENT
Start: 2022-04-14 | End: 2022-04-16

## 2022-04-14 RX ORDER — MORPHINE SULFATE 4 MG/ML
4 INJECTION, SOLUTION INTRAMUSCULAR; INTRAVENOUS EVERY 30 MIN PRN
Status: DISCONTINUED | OUTPATIENT
Start: 2022-04-14 | End: 2022-04-15 | Stop reason: ALTCHOICE

## 2022-04-14 RX ORDER — NICOTINE POLACRILEX 4 MG
15 LOZENGE BUCCAL
Status: DISCONTINUED | OUTPATIENT
Start: 2022-04-14 | End: 2022-04-17

## 2022-04-14 RX ORDER — ONDANSETRON 2 MG/ML
4 INJECTION INTRAMUSCULAR; INTRAVENOUS ONCE
Status: COMPLETED | OUTPATIENT
Start: 2022-04-14 | End: 2022-04-14

## 2022-04-14 RX ORDER — ACETAMINOPHEN 325 MG/1
650 TABLET ORAL EVERY 6 HOURS PRN
Status: DISCONTINUED | OUTPATIENT
Start: 2022-04-14 | End: 2022-04-17

## 2022-04-14 RX ORDER — SODIUM PHOSPHATE, DIBASIC AND SODIUM PHOSPHATE, MONOBASIC 7; 19 G/133ML; G/133ML
1 ENEMA RECTAL ONCE AS NEEDED
Status: DISCONTINUED | OUTPATIENT
Start: 2022-04-14 | End: 2022-04-17

## 2022-04-14 RX ORDER — POLYETHYLENE GLYCOL 3350 17 G/17G
17 POWDER, FOR SOLUTION ORAL DAILY PRN
Status: DISCONTINUED | OUTPATIENT
Start: 2022-04-14 | End: 2022-04-17

## 2022-04-14 RX ORDER — INSULIN ASPART 100 [IU]/ML
0.1 INJECTION, SOLUTION INTRAVENOUS; SUBCUTANEOUS ONCE
Status: COMPLETED | OUTPATIENT
Start: 2022-04-14 | End: 2022-04-14

## 2022-04-14 RX ORDER — HYDROMORPHONE HYDROCHLORIDE 1 MG/ML
0.4 INJECTION, SOLUTION INTRAMUSCULAR; INTRAVENOUS; SUBCUTANEOUS
Status: DISCONTINUED | OUTPATIENT
Start: 2022-04-14 | End: 2022-04-15

## 2022-04-14 RX ORDER — MELATONIN
3 NIGHTLY PRN
Status: DISCONTINUED | OUTPATIENT
Start: 2022-04-14 | End: 2022-04-17

## 2022-04-14 RX ORDER — DEXTROSE MONOHYDRATE 25 G/50ML
50 INJECTION, SOLUTION INTRAVENOUS
Status: DISCONTINUED | OUTPATIENT
Start: 2022-04-14 | End: 2022-04-17

## 2022-04-14 RX ORDER — NICOTINE POLACRILEX 4 MG
30 LOZENGE BUCCAL
Status: DISCONTINUED | OUTPATIENT
Start: 2022-04-14 | End: 2022-04-17

## 2022-04-14 NOTE — ED INITIAL ASSESSMENT (HPI)
61YF c/c of back pain pt state that she having thoracic  back pain that radiates to her abd with dry heaves for the last two days

## 2022-04-14 NOTE — ED QUICK NOTES
Orders for admission, patient is aware of plan and ready to go upstairs. Any questions, please call ED RN Regina Helms  at extension [phone]. Vaccinated?  Yes  Type of COVID test sent:Had rapid done yesterday  COVID Suspicion level: Low      Titratable drug(s) infusing:None  Rate:    LOC at time of transport:AOx4    Other pertinent information:    CIWA score=N/A  NIH score=N/A

## 2022-04-14 NOTE — PLAN OF CARE
MRI/MRCP completed  Showing Sones blocking Common Bile duct. New Consult to GI Dr Marina Parents, Dr Shavonne Colon on callnotified of consult. Orders for NPO affer 12am  Given. Spoke with Dr Arias Sanchez. with surgery, ok for CLSD at this time.

## 2022-04-14 NOTE — PROGRESS NOTES
04/14/22 0315 04/14/22 0317 04/14/22 0318   Vital Signs   Pulse 98  --   --    Heart Rate Source Monitor  --   --    Resp 18  --   --    Respiratory Quality Normal  --   --    /77 154/80 128/81   MAP (mmHg) 93 98 92   BP Location Right arm Right arm Right arm   BP Method Automatic Automatic Automatic   Patient Position Lying Sitting Standing     Admitting orthostatics

## 2022-04-14 NOTE — PLAN OF CARE
Left a message for the patient to contact us back.    If he does contact us back his chlamydia test is positive. He needs to inform any potential partners and he needs to be treated with Zithromax 250 milligrams 4 capsules (1000 mg) to be taken all at once. Prescription can be sent #4 with no refills.   Pt rec'd drowsy, easy to arosue. Pt reports pain to abd tolerable at this time. NPO status for MRI/MRCP and surgery consult. .  Pt refused sub Q lovenox as abd sore. No tele monitoring as she is MED? Surg pt. Bed in low locked position, call light and personal belongings in reach. Will continue to monitor.       Problem: Patient/Family Goals  Goal: Patient/Family Long Term Goal  Description: Patient's Long Term Goal: to go home    Interventions:  Labs, MRI/MRCP, VS, possible surgery  - See additional Care Plan goals for specific interventions  Outcome: Progressing  Goal: Patient/Family Short Term Goal  Description: Patient's Short Term Goal: pain relief  Labs, VS , Pain mangement   - See additional Care Plan goals for specific interventions  Outcome: Progressing     Problem: PAIN - ADULT  Goal: Verbalizes/displays adequate comfort level or patient's stated pain goal  Description: INTERVENTIONS:  - Encourage pt to monitor pain and request assistance  - Assess pain using appropriate pain scale  - Administer analgesics based on type and severity of pain and evaluate response  - Implement non-pharmacological measures as appropriate and evaluate response  - Consider cultural and social influences on pain and pain management  - Manage/alleviate anxiety  - Utilize distraction and/or relaxation techniques  - Monitor for opioid side effects  - Notify MD/LIP if interventions unsuccessful or patient reports new pain  - Anticipate increased pain with activity and pre-medicate as appropriate  Outcome: Progressing

## 2022-04-14 NOTE — PLAN OF CARE
NURSING ADMISSION NOTE      Patient admitted via cart  Oriented to room. Safety precautions initiated. Bed in low position. Call light in reach. Pt received from ER axox4. Rates abdominal pain a 3/10. Denies need for pain medication at this time. Ambulated to bathroom with sba. Denies burning or pain with urination. Assessment completed Admission database completed. Home medications verified with patient. Pt resting comfortably in bed. Call light within reach. Will continue to monitor pt. Skin assessment completed with Jas Salinas.

## 2022-04-15 ENCOUNTER — ANESTHESIA (OUTPATIENT)
Dept: ENDOSCOPY | Facility: HOSPITAL | Age: 62
End: 2022-04-15
Payer: COMMERCIAL

## 2022-04-15 ENCOUNTER — APPOINTMENT (OUTPATIENT)
Dept: GENERAL RADIOLOGY | Facility: HOSPITAL | Age: 62
End: 2022-04-15
Attending: INTERNAL MEDICINE
Payer: COMMERCIAL

## 2022-04-15 ENCOUNTER — ANESTHESIA EVENT (OUTPATIENT)
Dept: ENDOSCOPY | Facility: HOSPITAL | Age: 62
End: 2022-04-15
Payer: COMMERCIAL

## 2022-04-15 LAB
ALBUMIN SERPL-MCNC: 3 G/DL (ref 3.4–5)
ALBUMIN/GLOB SERPL: 0.8 {RATIO} (ref 1–2)
ALP LIVER SERPL-CCNC: 190 U/L
ALT SERPL-CCNC: 554 U/L
ANION GAP SERPL CALC-SCNC: 5 MMOL/L (ref 0–18)
AST SERPL-CCNC: 56 U/L (ref 15–37)
BILIRUB SERPL-MCNC: 0.7 MG/DL (ref 0.1–2)
BUN BLD-MCNC: 13 MG/DL (ref 7–18)
CALCIUM BLD-MCNC: 8.9 MG/DL (ref 8.5–10.1)
CHLORIDE SERPL-SCNC: 105 MMOL/L (ref 98–112)
CO2 SERPL-SCNC: 27 MMOL/L (ref 21–32)
CREAT BLD-MCNC: 0.48 MG/DL
GLOBULIN PLAS-MCNC: 3.6 G/DL (ref 2.8–4.4)
GLUCOSE BLD-MCNC: 108 MG/DL (ref 70–99)
GLUCOSE BLD-MCNC: 115 MG/DL (ref 70–99)
GLUCOSE BLD-MCNC: 123 MG/DL (ref 70–99)
GLUCOSE BLD-MCNC: 123 MG/DL (ref 70–99)
GLUCOSE BLD-MCNC: 133 MG/DL (ref 70–99)
HAV IGM SER QL: NONREACTIVE
HBV CORE IGM SER QL: NONREACTIVE
HBV SURFACE AG SERPL QL IA: NONREACTIVE
HCV AB SERPL QL IA: NONREACTIVE
OSMOLALITY SERPL CALC.SUM OF ELEC: 285 MOSM/KG (ref 275–295)
POTASSIUM SERPL-SCNC: 3.4 MMOL/L (ref 3.5–5.1)
PROT SERPL-MCNC: 6.6 G/DL (ref 6.4–8.2)
SODIUM SERPL-SCNC: 137 MMOL/L (ref 136–145)

## 2022-04-15 PROCEDURE — BF131ZZ FLUOROSCOPY OF GALLBLADDER AND BILE DUCTS USING LOW OSMOLAR CONTRAST: ICD-10-PCS | Performed by: INTERNAL MEDICINE

## 2022-04-15 PROCEDURE — 0FC98ZZ EXTIRPATION OF MATTER FROM COMMON BILE DUCT, VIA NATURAL OR ARTIFICIAL OPENING ENDOSCOPIC: ICD-10-PCS | Performed by: INTERNAL MEDICINE

## 2022-04-15 PROCEDURE — 99232 SBSQ HOSP IP/OBS MODERATE 35: CPT | Performed by: HOSPITALIST

## 2022-04-15 PROCEDURE — 74330 X-RAY BILE/PANC ENDOSCOPY: CPT | Performed by: INTERNAL MEDICINE

## 2022-04-15 PROCEDURE — 0F798DZ DILATION OF COMMON BILE DUCT WITH INTRALUMINAL DEVICE, VIA NATURAL OR ARTIFICIAL OPENING ENDOSCOPIC: ICD-10-PCS | Performed by: INTERNAL MEDICINE

## 2022-04-15 DEVICE — BILIARY STENT WITH NAVIFLEXTM RX DELIVERY SYSTEM
Type: IMPLANTABLE DEVICE | Status: FUNCTIONAL
Brand: ADVANIX™ BILIARY

## 2022-04-15 RX ORDER — NALOXONE HYDROCHLORIDE 0.4 MG/ML
80 INJECTION, SOLUTION INTRAMUSCULAR; INTRAVENOUS; SUBCUTANEOUS AS NEEDED
Status: CANCELLED | OUTPATIENT
Start: 2022-04-15 | End: 2022-04-16

## 2022-04-15 RX ORDER — HYDROMORPHONE HYDROCHLORIDE 1 MG/ML
0.4 INJECTION, SOLUTION INTRAMUSCULAR; INTRAVENOUS; SUBCUTANEOUS EVERY 2 HOUR PRN
Status: DISCONTINUED | OUTPATIENT
Start: 2022-04-15 | End: 2022-04-17

## 2022-04-15 RX ORDER — HYDROMORPHONE HYDROCHLORIDE 1 MG/ML
0.4 INJECTION, SOLUTION INTRAMUSCULAR; INTRAVENOUS; SUBCUTANEOUS EVERY 5 MIN PRN
Status: CANCELLED | OUTPATIENT
Start: 2022-04-15 | End: 2022-04-16

## 2022-04-15 RX ORDER — LIDOCAINE HYDROCHLORIDE 10 MG/ML
INJECTION, SOLUTION EPIDURAL; INFILTRATION; INTRACAUDAL; PERINEURAL AS NEEDED
Status: DISCONTINUED | OUTPATIENT
Start: 2022-04-15 | End: 2022-04-15 | Stop reason: SURG

## 2022-04-15 RX ORDER — ONDANSETRON 2 MG/ML
4 INJECTION INTRAMUSCULAR; INTRAVENOUS AS NEEDED
Status: CANCELLED | OUTPATIENT
Start: 2022-04-15 | End: 2022-04-16

## 2022-04-15 RX ORDER — SODIUM CHLORIDE, SODIUM LACTATE, POTASSIUM CHLORIDE, CALCIUM CHLORIDE 600; 310; 30; 20 MG/100ML; MG/100ML; MG/100ML; MG/100ML
INJECTION, SOLUTION INTRAVENOUS CONTINUOUS
Status: CANCELLED | OUTPATIENT
Start: 2022-04-15

## 2022-04-15 RX ORDER — SODIUM CHLORIDE 9 MG/ML
INJECTION, SOLUTION INTRAVENOUS CONTINUOUS PRN
Status: DISCONTINUED | OUTPATIENT
Start: 2022-04-15 | End: 2022-04-15 | Stop reason: SURG

## 2022-04-15 RX ORDER — METOCLOPRAMIDE HYDROCHLORIDE 5 MG/ML
10 INJECTION INTRAMUSCULAR; INTRAVENOUS AS NEEDED
Status: CANCELLED | OUTPATIENT
Start: 2022-04-15 | End: 2022-04-16

## 2022-04-15 RX ORDER — HYDROMORPHONE HYDROCHLORIDE 1 MG/ML
0.2 INJECTION, SOLUTION INTRAMUSCULAR; INTRAVENOUS; SUBCUTANEOUS EVERY 2 HOUR PRN
Status: DISCONTINUED | OUTPATIENT
Start: 2022-04-15 | End: 2022-04-17

## 2022-04-15 RX ORDER — HYDROMORPHONE HYDROCHLORIDE 1 MG/ML
0.1 INJECTION, SOLUTION INTRAMUSCULAR; INTRAVENOUS; SUBCUTANEOUS EVERY 2 HOUR PRN
Status: DISCONTINUED | OUTPATIENT
Start: 2022-04-15 | End: 2022-04-17

## 2022-04-15 RX ORDER — POTASSIUM CHLORIDE 20 MEQ/1
40 TABLET, EXTENDED RELEASE ORAL ONCE
Status: COMPLETED | OUTPATIENT
Start: 2022-04-15 | End: 2022-04-15

## 2022-04-15 RX ORDER — HYDROCODONE BITARTRATE AND ACETAMINOPHEN 10; 325 MG/1; MG/1
1 TABLET ORAL AS NEEDED
Status: CANCELLED | OUTPATIENT
Start: 2022-04-15

## 2022-04-15 RX ORDER — HYDROCODONE BITARTRATE AND ACETAMINOPHEN 10; 325 MG/1; MG/1
2 TABLET ORAL AS NEEDED
Status: CANCELLED | OUTPATIENT
Start: 2022-04-15

## 2022-04-15 RX ADMIN — SODIUM CHLORIDE: 9 INJECTION, SOLUTION INTRAVENOUS at 19:19:00

## 2022-04-15 RX ADMIN — LIDOCAINE HYDROCHLORIDE 50 MG: 10 INJECTION, SOLUTION EPIDURAL; INFILTRATION; INTRACAUDAL; PERINEURAL at 19:20:00

## 2022-04-15 NOTE — PLAN OF CARE
Assumed care of pt at [facility]. Pt A&Ox 4. On RA; SpO2 remaining greater than 92% with no complaints of SOB. Pt not on tele; no c/o cardiac symptoms. Pt denies chest pain or discomfort; seems to be resting comfortably at this time. POC GI consult to see. Possible ERCP. Plan of care reviewed with pt; all questions answered at this time. Bed in lowest position; call light within reach. High fall risk precautions are in place per unit protocol. 2200 pt desating 1L placed for comfort; 02 quickly recovered.     Problem: Patient/Family Goals  Goal: Patient/Family Long Term Goal  Description: Patient's Long Term Goal: to go home    Interventions:  Labs, MRI/MRCP, VS, possible surgery  - See additional Care Plan goals for specific interventions  Outcome: Progressing  Goal: Patient/Family Short Term Goal  Description: Patient's Short Term Goal: pain relief  Labs, VS , Pain mangement   - See additional Care Plan goals for specific interventions  Outcome: Progressing     Problem: PAIN - ADULT  Goal: Verbalizes/displays adequate comfort level or patient's stated pain goal  Description: INTERVENTIONS:  - Encourage pt to monitor pain and request assistance  - Assess pain using appropriate pain scale  - Administer analgesics based on type and severity of pain and evaluate response  - Implement non-pharmacological measures as appropriate and evaluate response  - Consider cultural and social influences on pain and pain management  - Manage/alleviate anxiety  - Utilize distraction and/or relaxation techniques  - Monitor for opioid side effects  - Notify MD/LIP if interventions unsuccessful or patient reports new pain  - Anticipate increased pain with activity and pre-medicate as appropriate  Outcome: Progressing     Problem: RISK FOR INFECTION - ADULT  Goal: Absence of fever/infection during anticipated neutropenic period  Description: INTERVENTIONS  - Monitor WBC  - Administer growth factors as ordered  - Implement neutropenic guidelines  Outcome: Progressing     Problem: SAFETY ADULT - FALL  Goal: Free from fall injury  Description: INTERVENTIONS:  - Assess pt frequently for physical needs  - Identify cognitive and physical deficits and behaviors that affect risk of falls.   - [place] fall precautions as indicated by assessment.  - Educate pt/family on patient safety including physical limitations  - Instruct pt to call for assistance with activity based on assessment  - Modify environment to reduce risk of injury  - Provide assistive devices as appropriate  - Consider OT/PT consult to assist with strengthening/mobility  - Encourage toileting schedule  Outcome: Progressing     Problem: DISCHARGE PLANNING  Goal: Discharge to home or other facility with appropriate resources  Description: INTERVENTIONS:  - Identify barriers to discharge w/pt and caregiver  - Include patient/family/discharge partner in discharge planning  - Arrange for needed discharge resources and transportation as appropriate  - Identify discharge learning needs (meds, wound care, etc)  - Arrange for interpreters to assist at discharge as needed  - Consider post-discharge preferences of patient/family/discharge partner  - Complete POLST form as appropriate  - Assess patient's ability to be responsible for managing their own health  - Refer to Case Management Department for coordinating discharge planning if the patient needs post-hospital services based on physician/LIP order or complex needs related to functional status, cognitive ability or social support system  Outcome: Progressing

## 2022-04-15 NOTE — PLAN OF CARE
Assumed care of patient at 0730. Patient alert and oriented x4. Oxygenation stable on room air. Lung sounds clear bilaterally. Pt not on tele. Potassium replaced per protocol. Pt is continent of bowel and bladder. Pt noting abdominal pain and nausea. Zofran per order. IV pain medication per order. Pt reported relief. Pt ambulates standby. Plan for ERCP today. Patient updated on plan of care. Questions answered.      1630 - Pt taken to GI for ERCP procedure    Problem: Patient/Family Goals  Goal: Patient/Family Long Term Goal  Description: Patient's Long Term Goal: to go home    Interventions:  Labs, MRI/MRCP, VS, possible surgery  - See additional Care Plan goals for specific interventions  Outcome: Progressing  Goal: Patient/Family Short Term Goal  Description: Patient's Short Term Goal: pain relief  Labs, VS , Pain mangement   - See additional Care Plan goals for specific interventions  Outcome: Progressing     Problem: PAIN - ADULT  Goal: Verbalizes/displays adequate comfort level or patient's stated pain goal  Description: INTERVENTIONS:  - Encourage pt to monitor pain and request assistance  - Assess pain using appropriate pain scale  - Administer analgesics based on type and severity of pain and evaluate response  - Implement non-pharmacological measures as appropriate and evaluate response  - Consider cultural and social influences on pain and pain management  - Manage/alleviate anxiety  - Utilize distraction and/or relaxation techniques  - Monitor for opioid side effects  - Notify MD/LIP if interventions unsuccessful or patient reports new pain  - Anticipate increased pain with activity and pre-medicate as appropriate  Outcome: Progressing     Problem: RISK FOR INFECTION - ADULT  Goal: Absence of fever/infection during anticipated neutropenic period  Description: INTERVENTIONS  - Monitor WBC  - Administer growth factors as ordered  - Implement neutropenic guidelines  Outcome: Progressing     Problem: SAFETY ADULT - FALL  Goal: Free from fall injury  Description: INTERVENTIONS:  - Assess pt frequently for physical needs  - Identify cognitive and physical deficits and behaviors that affect risk of falls.   - [place] fall precautions as indicated by assessment.  - Educate pt/family on patient safety including physical limitations  - Instruct pt to call for assistance with activity based on assessment  - Modify environment to reduce risk of injury  - Provide assistive devices as appropriate  - Consider OT/PT consult to assist with strengthening/mobility  - Encourage toileting schedule  Outcome: Progressing     Problem: DISCHARGE PLANNING  Goal: Discharge to home or other facility with appropriate resources  Description: INTERVENTIONS:  - Identify barriers to discharge w/pt and caregiver  - Include patient/family/discharge partner in discharge planning  - Arrange for needed discharge resources and transportation as appropriate  - Identify discharge learning needs (meds, wound care, etc)  - Arrange for interpreters to assist at discharge as needed  - Consider post-discharge preferences of patient/family/discharge partner  - Complete POLST form as appropriate  - Assess patient's ability to be responsible for managing their own health  - Refer to Case Management Department for coordinating discharge planning if the patient needs post-hospital services based on physician/LIP order or complex needs related to functional status, cognitive ability or social support system  Outcome: Progressing

## 2022-04-16 ENCOUNTER — APPOINTMENT (OUTPATIENT)
Dept: GENERAL RADIOLOGY | Facility: HOSPITAL | Age: 62
End: 2022-04-16
Attending: SURGERY
Payer: COMMERCIAL

## 2022-04-16 ENCOUNTER — ANESTHESIA EVENT (OUTPATIENT)
Dept: SURGERY | Facility: HOSPITAL | Age: 62
End: 2022-04-16
Payer: COMMERCIAL

## 2022-04-16 ENCOUNTER — ANESTHESIA (OUTPATIENT)
Dept: SURGERY | Facility: HOSPITAL | Age: 62
End: 2022-04-16
Payer: COMMERCIAL

## 2022-04-16 LAB
ALBUMIN SERPL-MCNC: 2.8 G/DL (ref 3.4–5)
ALBUMIN/GLOB SERPL: 0.8 {RATIO} (ref 1–2)
ALP LIVER SERPL-CCNC: 164 U/L
ALT SERPL-CCNC: 365 U/L
ANION GAP SERPL CALC-SCNC: 8 MMOL/L (ref 0–18)
AST SERPL-CCNC: 29 U/L (ref 15–37)
BASOPHILS # BLD AUTO: 0.05 X10(3) UL (ref 0–0.2)
BASOPHILS NFR BLD AUTO: 0.9 %
BILIRUB SERPL-MCNC: 0.5 MG/DL (ref 0.1–2)
BUN BLD-MCNC: 19 MG/DL (ref 7–18)
CALCIUM BLD-MCNC: 8.7 MG/DL (ref 8.5–10.1)
CHLORIDE SERPL-SCNC: 108 MMOL/L (ref 98–112)
CO2 SERPL-SCNC: 23 MMOL/L (ref 21–32)
CREAT BLD-MCNC: 0.56 MG/DL
EOSINOPHIL # BLD AUTO: 0.22 X10(3) UL (ref 0–0.7)
EOSINOPHIL NFR BLD AUTO: 4 %
ERYTHROCYTE [DISTWIDTH] IN BLOOD BY AUTOMATED COUNT: 13.2 %
GLOBULIN PLAS-MCNC: 3.3 G/DL (ref 2.8–4.4)
GLUCOSE BLD-MCNC: 133 MG/DL (ref 70–99)
GLUCOSE BLD-MCNC: 158 MG/DL (ref 70–99)
GLUCOSE BLD-MCNC: 91 MG/DL (ref 70–99)
GLUCOSE BLD-MCNC: 96 MG/DL (ref 70–99)
HCT VFR BLD AUTO: 40.6 %
HGB BLD-MCNC: 13 G/DL
IMM GRANULOCYTES # BLD AUTO: 0.02 X10(3) UL (ref 0–1)
IMM GRANULOCYTES NFR BLD: 0.4 %
LYMPHOCYTES # BLD AUTO: 1.59 X10(3) UL (ref 1–4)
LYMPHOCYTES NFR BLD AUTO: 29.1 %
MCH RBC QN AUTO: 27.9 PG (ref 26–34)
MCHC RBC AUTO-ENTMCNC: 32 G/DL (ref 31–37)
MCV RBC AUTO: 87.1 FL
MONOCYTES # BLD AUTO: 0.6 X10(3) UL (ref 0.1–1)
MONOCYTES NFR BLD AUTO: 11 %
NEUTROPHILS # BLD AUTO: 2.98 X10 (3) UL (ref 1.5–7.7)
NEUTROPHILS # BLD AUTO: 2.98 X10(3) UL (ref 1.5–7.7)
NEUTROPHILS NFR BLD AUTO: 54.6 %
OSMOLALITY SERPL CALC.SUM OF ELEC: 290 MOSM/KG (ref 275–295)
PLATELET # BLD AUTO: 219 10(3)UL (ref 150–450)
POTASSIUM SERPL-SCNC: 3.8 MMOL/L (ref 3.5–5.1)
POTASSIUM SERPL-SCNC: 3.8 MMOL/L (ref 3.5–5.1)
PROT SERPL-MCNC: 6.1 G/DL (ref 6.4–8.2)
RBC # BLD AUTO: 4.66 X10(6)UL
SODIUM SERPL-SCNC: 139 MMOL/L (ref 136–145)
WBC # BLD AUTO: 5.5 X10(3) UL (ref 4–11)

## 2022-04-16 PROCEDURE — BF131ZZ FLUOROSCOPY OF GALLBLADDER AND BILE DUCTS USING LOW OSMOLAR CONTRAST: ICD-10-PCS | Performed by: SURGERY

## 2022-04-16 PROCEDURE — 99243 OFF/OP CNSLTJ NEW/EST LOW 30: CPT | Performed by: COLON & RECTAL SURGERY

## 2022-04-16 PROCEDURE — 74300 X-RAY BILE DUCTS/PANCREAS: CPT | Performed by: SURGERY

## 2022-04-16 PROCEDURE — 99233 SBSQ HOSP IP/OBS HIGH 50: CPT | Performed by: HOSPITALIST

## 2022-04-16 PROCEDURE — 0FT44ZZ RESECTION OF GALLBLADDER, PERCUTANEOUS ENDOSCOPIC APPROACH: ICD-10-PCS | Performed by: SURGERY

## 2022-04-16 PROCEDURE — 47563 LAPARO CHOLECYSTECTOMY/GRAPH: CPT | Performed by: PHYSICIAN ASSISTANT

## 2022-04-16 PROCEDURE — 47563 LAPARO CHOLECYSTECTOMY/GRAPH: CPT | Performed by: COLON & RECTAL SURGERY

## 2022-04-16 RX ORDER — ONDANSETRON 2 MG/ML
4 INJECTION INTRAMUSCULAR; INTRAVENOUS AS NEEDED
Status: DISCONTINUED | OUTPATIENT
Start: 2022-04-16 | End: 2022-04-17 | Stop reason: HOSPADM

## 2022-04-16 RX ORDER — ONDANSETRON 2 MG/ML
INJECTION INTRAMUSCULAR; INTRAVENOUS AS NEEDED
Status: DISCONTINUED | OUTPATIENT
Start: 2022-04-16 | End: 2022-04-16 | Stop reason: SURG

## 2022-04-16 RX ORDER — DIPHENHYDRAMINE HYDROCHLORIDE 50 MG/ML
12.5 INJECTION INTRAMUSCULAR; INTRAVENOUS AS NEEDED
Status: DISCONTINUED | OUTPATIENT
Start: 2022-04-16 | End: 2022-04-17 | Stop reason: HOSPADM

## 2022-04-16 RX ORDER — HYDROCODONE BITARTRATE AND ACETAMINOPHEN 5; 325 MG/1; MG/1
2 TABLET ORAL AS NEEDED
Status: DISCONTINUED | OUTPATIENT
Start: 2022-04-16 | End: 2022-04-17 | Stop reason: HOSPADM

## 2022-04-16 RX ORDER — NALOXONE HYDROCHLORIDE 0.4 MG/ML
80 INJECTION, SOLUTION INTRAMUSCULAR; INTRAVENOUS; SUBCUTANEOUS AS NEEDED
Status: DISCONTINUED | OUTPATIENT
Start: 2022-04-16 | End: 2022-04-17 | Stop reason: HOSPADM

## 2022-04-16 RX ORDER — HYDROMORPHONE HYDROCHLORIDE 1 MG/ML
0.4 INJECTION, SOLUTION INTRAMUSCULAR; INTRAVENOUS; SUBCUTANEOUS EVERY 5 MIN PRN
Status: DISCONTINUED | OUTPATIENT
Start: 2022-04-16 | End: 2022-04-17 | Stop reason: HOSPADM

## 2022-04-16 RX ORDER — CEFOXITIN 1 G/1
INJECTION, POWDER, FOR SOLUTION INTRAVENOUS AS NEEDED
Status: DISCONTINUED | OUTPATIENT
Start: 2022-04-16 | End: 2022-04-16 | Stop reason: SURG

## 2022-04-16 RX ORDER — MIDAZOLAM HYDROCHLORIDE 1 MG/ML
1 INJECTION INTRAMUSCULAR; INTRAVENOUS EVERY 5 MIN PRN
Status: DISCONTINUED | OUTPATIENT
Start: 2022-04-16 | End: 2022-04-17 | Stop reason: HOSPADM

## 2022-04-16 RX ORDER — MIDAZOLAM HYDROCHLORIDE 1 MG/ML
INJECTION INTRAMUSCULAR; INTRAVENOUS AS NEEDED
Status: DISCONTINUED | OUTPATIENT
Start: 2022-04-16 | End: 2022-04-16 | Stop reason: SURG

## 2022-04-16 RX ORDER — HYDROCODONE BITARTRATE AND ACETAMINOPHEN 5; 325 MG/1; MG/1
1 TABLET ORAL AS NEEDED
Status: DISCONTINUED | OUTPATIENT
Start: 2022-04-16 | End: 2022-04-17 | Stop reason: HOSPADM

## 2022-04-16 RX ORDER — MEPERIDINE HYDROCHLORIDE 25 MG/ML
12.5 INJECTION INTRAMUSCULAR; INTRAVENOUS; SUBCUTANEOUS AS NEEDED
Status: DISCONTINUED | OUTPATIENT
Start: 2022-04-16 | End: 2022-04-17 | Stop reason: HOSPADM

## 2022-04-16 RX ORDER — ROCURONIUM BROMIDE 10 MG/ML
INJECTION, SOLUTION INTRAVENOUS AS NEEDED
Status: DISCONTINUED | OUTPATIENT
Start: 2022-04-16 | End: 2022-04-16 | Stop reason: SURG

## 2022-04-16 RX ORDER — SODIUM CHLORIDE, SODIUM LACTATE, POTASSIUM CHLORIDE, CALCIUM CHLORIDE 600; 310; 30; 20 MG/100ML; MG/100ML; MG/100ML; MG/100ML
INJECTION, SOLUTION INTRAVENOUS CONTINUOUS
Status: DISCONTINUED | OUTPATIENT
Start: 2022-04-16 | End: 2022-04-17 | Stop reason: HOSPADM

## 2022-04-16 RX ORDER — SODIUM CHLORIDE, SODIUM LACTATE, POTASSIUM CHLORIDE, CALCIUM CHLORIDE 600; 310; 30; 20 MG/100ML; MG/100ML; MG/100ML; MG/100ML
INJECTION, SOLUTION INTRAVENOUS CONTINUOUS PRN
Status: DISCONTINUED | OUTPATIENT
Start: 2022-04-16 | End: 2022-04-16 | Stop reason: SURG

## 2022-04-16 RX ORDER — HYDROMORPHONE HYDROCHLORIDE 1 MG/ML
INJECTION, SOLUTION INTRAMUSCULAR; INTRAVENOUS; SUBCUTANEOUS
Status: COMPLETED
Start: 2022-04-16 | End: 2022-04-16

## 2022-04-16 RX ORDER — BUPIVACAINE HYDROCHLORIDE AND EPINEPHRINE 5; 5 MG/ML; UG/ML
INJECTION, SOLUTION EPIDURAL; INTRACAUDAL; PERINEURAL AS NEEDED
Status: DISCONTINUED | OUTPATIENT
Start: 2022-04-16 | End: 2022-04-16 | Stop reason: HOSPADM

## 2022-04-16 RX ORDER — INSULIN ASPART 100 [IU]/ML
INJECTION, SOLUTION INTRAVENOUS; SUBCUTANEOUS ONCE
Status: DISCONTINUED | OUTPATIENT
Start: 2022-04-16 | End: 2022-04-17 | Stop reason: HOSPADM

## 2022-04-16 RX ADMIN — MIDAZOLAM HYDROCHLORIDE 2 MG: 1 INJECTION INTRAMUSCULAR; INTRAVENOUS at 21:18:00

## 2022-04-16 RX ADMIN — ROCURONIUM BROMIDE 50 MG: 10 INJECTION, SOLUTION INTRAVENOUS at 21:20:00

## 2022-04-16 RX ADMIN — SODIUM CHLORIDE, SODIUM LACTATE, POTASSIUM CHLORIDE, CALCIUM CHLORIDE: 600; 310; 30; 20 INJECTION, SOLUTION INTRAVENOUS at 21:18:00

## 2022-04-16 RX ADMIN — ROCURONIUM BROMIDE 10 MG: 10 INJECTION, SOLUTION INTRAVENOUS at 21:55:00

## 2022-04-16 RX ADMIN — ROCURONIUM BROMIDE 10 MG: 10 INJECTION, SOLUTION INTRAVENOUS at 22:25:00

## 2022-04-16 RX ADMIN — SODIUM CHLORIDE, SODIUM LACTATE, POTASSIUM CHLORIDE, CALCIUM CHLORIDE: 600; 310; 30; 20 INJECTION, SOLUTION INTRAVENOUS at 23:05:00

## 2022-04-16 RX ADMIN — ONDANSETRON 4 MG: 2 INJECTION INTRAMUSCULAR; INTRAVENOUS at 22:46:00

## 2022-04-16 RX ADMIN — CEFOXITIN 2 G: 1 INJECTION, POWDER, FOR SOLUTION INTRAVENOUS at 21:28:00

## 2022-04-16 NOTE — PLAN OF CARE
Assumed care of patient at 0730. Patient alert  and oriented x4. Oxygenation stable on room air. Lung sounds clear bilaterally. Patient not on telemetry. No complaints of dizziness, shortness of breath, or chest pain. Pt is continent of bowel and bladder. Pt noted a headache this AM. Managed with oral pain medication per order. Pt ambulates ad alfie. Possible cholecystectomy today. Patient updated on plan of care. Questions answered. Pt to surgery at approx. 1500.     1900 - Pt still off floor at surgery at change of shift.      Problem: Patient/Family Goals  Goal: Patient/Family Long Term Goal  Description: Patient's Long Term Goal: to go home    Interventions:  Labs, MRI/MRCP, VS, possible surgery  - See additional Care Plan goals for specific interventions  Outcome: Progressing  Goal: Patient/Family Short Term Goal  Description: Patient's Short Term Goal: pain relief  Labs, VS , Pain mangement   - See additional Care Plan goals for specific interventions  Outcome: Progressing     Problem: PAIN - ADULT  Goal: Verbalizes/displays adequate comfort level or patient's stated pain goal  Description: INTERVENTIONS:  - Encourage pt to monitor pain and request assistance  - Assess pain using appropriate pain scale  - Administer analgesics based on type and severity of pain and evaluate response  - Implement non-pharmacological measures as appropriate and evaluate response  - Consider cultural and social influences on pain and pain management  - Manage/alleviate anxiety  - Utilize distraction and/or relaxation techniques  - Monitor for opioid side effects  - Notify MD/LIP if interventions unsuccessful or patient reports new pain  - Anticipate increased pain with activity and pre-medicate as appropriate  Outcome: Progressing     Problem: RISK FOR INFECTION - ADULT  Goal: Absence of fever/infection during anticipated neutropenic period  Description: INTERVENTIONS  - Monitor WBC  - Administer growth factors as ordered  - Implement neutropenic guidelines  Outcome: Progressing     Problem: SAFETY ADULT - FALL  Goal: Free from fall injury  Description: INTERVENTIONS:  - Assess pt frequently for physical needs  - Identify cognitive and physical deficits and behaviors that affect risk of falls.   - [place] fall precautions as indicated by assessment.  - Educate pt/family on patient safety including physical limitations  - Instruct pt to call for assistance with activity based on assessment  - Modify environment to reduce risk of injury  - Provide assistive devices as appropriate  - Consider OT/PT consult to assist with strengthening/mobility  - Encourage toileting schedule  Outcome: Progressing     Problem: DISCHARGE PLANNING  Goal: Discharge to home or other facility with appropriate resources  Description: INTERVENTIONS:  - Identify barriers to discharge w/pt and caregiver  - Include patient/family/discharge partner in discharge planning  - Arrange for needed discharge resources and transportation as appropriate  - Identify discharge learning needs (meds, wound care, etc)  - Arrange for interpreters to assist at discharge as needed  - Consider post-discharge preferences of patient/family/discharge partner  - Complete POLST form as appropriate  - Assess patient's ability to be responsible for managing their own health  - Refer to Case Management Department for coordinating discharge planning if the patient needs post-hospital services based on physician/LIP order or complex needs related to functional status, cognitive ability or social support system  Outcome: Progressing

## 2022-04-16 NOTE — PLAN OF CARE
Pt arrived from [facility] via transport around 2030. Pt A&Ox 4. On RA; SpO2 remaining greater than 92% with no complaints of SOB. Pt not on tele and has no c/o cardiac symptoms. Pt denies chest pain but does c/o RUQ discomfort; PRN pain medication given as ordered, seems to be resting comfortably at this time. POC anticipating cholecystectomy per general surgery. Continue IV abx daily. Plan of care reviewed with pt and  whom was at the bedside; all questions answered at this time. Bed in lowest position; call light within reach. High fall risk precautions are in place per unit protocol.      Problem: Patient/Family Goals  Goal: Patient/Family Long Term Goal  Description: Patient's Long Term Goal: to go home    Interventions:  Labs, MRI/MRCP, VS, possible surgery  - See additional Care Plan goals for specific interventions  Outcome: Progressing  Goal: Patient/Family Short Term Goal  Description: Patient's Short Term Goal: pain relief  Labs, VS , Pain mangement   - See additional Care Plan goals for specific interventions  Outcome: Progressing     Problem: PAIN - ADULT  Goal: Verbalizes/displays adequate comfort level or patient's stated pain goal  Description: INTERVENTIONS:  - Encourage pt to monitor pain and request assistance  - Assess pain using appropriate pain scale  - Administer analgesics based on type and severity of pain and evaluate response  - Implement non-pharmacological measures as appropriate and evaluate response  - Consider cultural and social influences on pain and pain management  - Manage/alleviate anxiety  - Utilize distraction and/or relaxation techniques  - Monitor for opioid side effects  - Notify MD/LIP if interventions unsuccessful or patient reports new pain  - Anticipate increased pain with activity and pre-medicate as appropriate  Outcome: Progressing     Problem: RISK FOR INFECTION - ADULT  Goal: Absence of fever/infection during anticipated neutropenic period  Description: INTERVENTIONS  - Monitor WBC  - Administer growth factors as ordered  - Implement neutropenic guidelines  Outcome: Progressing     Problem: SAFETY ADULT - FALL  Goal: Free from fall injury  Description: INTERVENTIONS:  - Assess pt frequently for physical needs  - Identify cognitive and physical deficits and behaviors that affect risk of falls.   - [place] fall precautions as indicated by assessment.  - Educate pt/family on patient safety including physical limitations  - Instruct pt to call for assistance with activity based on assessment  - Modify environment to reduce risk of injury  - Provide assistive devices as appropriate  - Consider OT/PT consult to assist with strengthening/mobility  - Encourage toileting schedule  Outcome: Progressing     Problem: DISCHARGE PLANNING  Goal: Discharge to home or other facility with appropriate resources  Description: INTERVENTIONS:  - Identify barriers to discharge w/pt and caregiver  - Include patient/family/discharge partner in discharge planning  - Arrange for needed discharge resources and transportation as appropriate  - Identify discharge learning needs (meds, wound care, etc)  - Arrange for interpreters to assist at discharge as needed  - Consider post-discharge preferences of patient/family/discharge partner  - Complete POLST form as appropriate  - Assess patient's ability to be responsible for managing their own health  - Refer to Case Management Department for coordinating discharge planning if the patient needs post-hospital services based on physician/LIP order or complex needs related to functional status, cognitive ability or social support system  Outcome: Progressing

## 2022-04-17 VITALS
HEART RATE: 84 BPM | DIASTOLIC BLOOD PRESSURE: 60 MMHG | RESPIRATION RATE: 16 BRPM | SYSTOLIC BLOOD PRESSURE: 138 MMHG | OXYGEN SATURATION: 97 % | TEMPERATURE: 98 F | BODY MASS INDEX: 29 KG/M2 | WEIGHT: 176.81 LBS

## 2022-04-17 LAB
F-ACTIN (SMOOTH MUSCLE) AB: 4 UNITS
GLUCOSE BLD-MCNC: 142 MG/DL (ref 70–99)
GLUCOSE BLD-MCNC: 154 MG/DL (ref 70–99)
GLUCOSE BLD-MCNC: 157 MG/DL (ref 70–99)
MITOCHONDRIAL M2 AB, IGG: 2.7 UNITS

## 2022-04-17 PROCEDURE — 99239 HOSP IP/OBS DSCHRG MGMT >30: CPT | Performed by: HOSPITALIST

## 2022-04-17 RX ORDER — SODIUM CHLORIDE, SODIUM LACTATE, POTASSIUM CHLORIDE, CALCIUM CHLORIDE 600; 310; 30; 20 MG/100ML; MG/100ML; MG/100ML; MG/100ML
INJECTION, SOLUTION INTRAVENOUS CONTINUOUS
Status: ACTIVE | OUTPATIENT
Start: 2022-04-17 | End: 2022-04-17

## 2022-04-17 RX ORDER — OXYCODONE HYDROCHLORIDE 5 MG/1
5 TABLET ORAL EVERY 4 HOURS PRN
Status: DISCONTINUED | OUTPATIENT
Start: 2022-04-16 | End: 2022-04-17

## 2022-04-17 RX ORDER — HYDROCODONE BITARTRATE AND ACETAMINOPHEN 5; 325 MG/1; MG/1
1 TABLET ORAL EVERY 6 HOURS PRN
Qty: 15 TABLET | Refills: 0 | Status: SHIPPED | OUTPATIENT
Start: 2022-04-17

## 2022-04-17 RX ORDER — ONDANSETRON 4 MG/1
4 TABLET, FILM COATED ORAL EVERY 8 HOURS PRN
Qty: 12 TABLET | Refills: 0 | Status: SHIPPED | OUTPATIENT
Start: 2022-04-17 | End: 2022-04-17

## 2022-04-17 RX ORDER — OXYCODONE HYDROCHLORIDE 5 MG/1
2.5 TABLET ORAL EVERY 4 HOURS PRN
Status: DISCONTINUED | OUTPATIENT
Start: 2022-04-16 | End: 2022-04-17

## 2022-04-17 RX ORDER — OXYCODONE HYDROCHLORIDE 5 MG/1
10 TABLET ORAL EVERY 4 HOURS PRN
Status: DISCONTINUED | OUTPATIENT
Start: 2022-04-16 | End: 2022-04-17

## 2022-04-17 RX ORDER — HYDROCODONE BITARTRATE AND ACETAMINOPHEN 5; 325 MG/1; MG/1
1 TABLET ORAL EVERY 6 HOURS PRN
Qty: 15 TABLET | Refills: 0 | Status: SHIPPED | OUTPATIENT
Start: 2022-04-17 | End: 2022-04-17

## 2022-04-17 RX ORDER — HYDROMORPHONE HYDROCHLORIDE 1 MG/ML
INJECTION, SOLUTION INTRAMUSCULAR; INTRAVENOUS; SUBCUTANEOUS
Status: COMPLETED
Start: 2022-04-17 | End: 2022-04-17

## 2022-04-17 RX ORDER — ONDANSETRON 4 MG/1
4 TABLET, FILM COATED ORAL EVERY 8 HOURS PRN
Qty: 12 TABLET | Refills: 0 | Status: SHIPPED | OUTPATIENT
Start: 2022-04-17

## 2022-04-17 NOTE — PLAN OF CARE
Rec'd pt drowsy, easily aroused. Pt medicated for pain as ordered wwith good relief noted. Pt fearful that pain will be out of control. Pt reminded will monitor frequently. Lungs clear. bilaterally. Pt up to BR with steady gait. Pt toterating diet at this time. No telemetry monitoring at this time. Call light and personal belongings in reach. Will continue to monitor. Minda Aaron

## 2022-04-17 NOTE — ANESTHESIA PROCEDURE NOTES
Airway  Date/Time: 4/16/2022 9:23 PM  Urgency: elective      General Information and Staff    Patient location during procedure: OR  Anesthesiologist: Ivette Mccray MD  Performed: anesthesiologist     Indications and Patient Condition  Indications for airway management: anesthesia  Sedation level: deep  Preoxygenated: yes  Patient position: sniffing  Mask difficulty assessment: 1 - vent by mask    Final Airway Details  Final airway type: endotracheal airway      Successful airway: ETT  Cuffed: yes   Successful intubation technique: direct laryngoscopy  Endotracheal tube insertion site: oral  Blade: Kapil  Blade size: #3    Cormack-Lehane Classification: grade I - full view of glottis  Placement verified by: chest auscultation and capnometry   Measured from: lips  Number of attempts at approach: 1

## 2022-04-17 NOTE — OPERATIVE REPORT
[facility]   Operative Note    Konstantin Anaya Location: OR   CSN [id number] MRN [id number]   Admission Date 4/13/2022 Operation Date 4/16/2022   Attending Physician Abdelrahman Flower MD Operating Physician Jori Brownlee MD     Date of procedure:   16th 2022    Pre-Operative Diagnosis: Acute cholecystitis, cholelithiasis, choledocholithiasis status post ERCP and stone extraction    Indication for Surgery: Symptomatic cholelithiasis, resolved choledocholithiasis    Post-Operative Diagnosis/Operative Findings: Same as above-severe chronic cholecystitis, hydrops of the gallbladder, negative intraoperative cholangiogram    Procedure performed:  Laparoscopic cholecystectomy with intraoperative cholangiogram, placement of Surgicel    Surgeon(s) and Role:     Elida Burch MD - Primary    Assist:     ANNIE Olivo    Anesthesia: General    History of Present Illness:    79-year-old female who was initially evaluated by Dr. Sandra Romero for symptomatic cholelithiasis  Work-up including CT scan revealed a distended gallbladder with gallbladder wall thickening and pericholecystic fluid. MRCP was consistent with choledocholithiasis. GI service was consulted and subsequently the patient underwent MRCP stone extraction. Treatment options were reviewed in detail with the patient as well as her . At this time they do wish to proceed with laparoscopic cholecystectomy with intraoperative cholangiogram during this admission.   The patient and her  have no further questions at this time and wished to proceed with surgery today Deisy Needs Dr. Marielos Willis abscess, bacteremia high so she is doing fine everything went fine with surgery gallbladder was really pretty inflamed and scarred and so I can imagine that she has been having problems for quite some time    Discussed with patient:  The potential risks and benefits of the procedure were discussed in detail with the patient including but not limited to bleeding, infections, seroma/hematoma formation, postoperative wound complications including development of a hernia, trocar injury, intra-abdominal organ injury, bile leakage, biloma formation, common bile duct injury, conversion to an open procedure, inability to complete the cholangiogram, possible need for a drain, possible recurrence or persistence of symptoms despite surgery, postoperative diarrhea, possible need for further treatment or intervention pending cholangiogram results. These and other potential intraoperative and perioperative risks were discussed and the patient does not have any questions and does wish to proceed with surgery today. Note:  A surgical assistant was essential to the performance and conduct of this case, especially in the performance of the cholangiogram and the careful dissection needed in and around the triangle of Calot and gallbladder hilum. Summary of Procedure: The patient was taken to the operating room and was placed on the OR table in the supine position. After the induction of general anesthesia perioperative antibiotics were given. The patient was then prepped and draped in usual sterile fashion. Using local anesthesia a promise-umbilical incision was made and the anterior abdominal fascia was elevated with a Kocker forcep. The Veress needle was introduced into the abdomen and the abdomen was insufflated to 15 mm mercury. The Veress needle was then exchanged for a 11 mm port. The laparoscope was introduced. A 5 mm epigastric port and two 5 mm lateral ports were placed under direct vision without incidence. The patient was placed into a reverse Trendelenburg position with the right side up. Initial evaluation of the right upper quadrant revealed the gallbladder wall to be thickened and indurated consistent with severe chronic cholecystitis. The gallbladder was tensely distended and therefore required percutaneous decompression to improve maneuverability.   The gallbladder was decompressed percutaneously under direct vision. The retrieved bile was clear indicating hydrops of the gallbladder. .  The gallbladder was grasped at the fundus and elevated superiorly. Escamilla's pouch was identified and this was retracted laterally to expose the triangle of Calot. Dissection in the cystic duct-gallbladder junction was performed to define the cystic duct for sufficient length. Dissection was made significantly more difficult by the degree of chronic cholecystitis. The 5 mm peanut dissector was used for additional blunt dissection. Dissection was kept above the line of Rouviere and a critical view was obtained. The cystic duct was then clipped once proximally on the specimen side. A ductotomy was made. A cholangiocatheter was introduced through the lateral 5 mm port. The cholangiocatheter was introduced into the cystic duct. An intraoperative cholangiogram was then performed. There was no evidence of a filling defect in the cystic or the common bile duct. The common bile duct tapers down smoothly to the duodenum and there was free flow of contrast into the duodenum. The proximal common hepatic duct and distal intrahepatic radicals were visualized  without any evidence of a filling defects or other abnormalities. Representative images were submitted to the Radiology Department for a final read. The cholangiocatheter was then removed. The cystic duct was then clipped  3 times distally. The cystic duct was then divided. The cystic artery was identified and seen  to ascend onto the gallbladder wall. This was also defined for a sufficient length and was clipped twice proximally and once distally and divided. Electrocautery was then used to dissect the gallbladder away from the liver bed. Having completed this maneuver the gallbladder was placed into a Endopouch and was withdrawn through the umbilical port.  The right upper quadrant was copiously irrigated until the irrigant was clear. The clips across the cystic duct and the cystic artery were examined and found to be well approximated and in good position. There was no evidence of bleeding or bile leakage from the liver bed. However due to the degree of inflammatory changes, Surgicel was placed into the gallbladder fossa as a precaution the accessory ports were removed under direct vision and there was no evidence of bleeding from the anterior abdominal wall. The abdomen was then deflated. The umbilical port was closed with 0 Vicryl. All skin incisions were closed with 4-0 Vicryl in a subcuticular manner. Steri-Strips and sterile dressings were placed. All sponge, instrument and needle counts were correct at the conclusion of the procedure. The patient did tolerate the procedure well. The patient was taken to the recovery room in stable condition. EBL:   Minimal     Pathologic specimens:  The gallbladder and its contents    Jasbir Natarajan MD

## 2022-04-17 NOTE — PLAN OF CARE
Assumed care of patient at [facility] after lap marbella procedure. Pt is alert and oriented x4. On room air with adequate saturations. Not on tele. Lap sites x4: clean, dry intact. Abdomen soft and nondistended. Patient had one episode of emesis, green/bile. Zofran given, pt expressed relief after emesis and Zofran. LR going at 100cc/hr. Continent of bowels and bladder. QID accucheck. See MAR for pain interventions. No complaints at this time.        Problem: Patient/Family Goals  Goal: Patient/Family Long Term Goal  Description: Patient's Long Term Goal: to go home    Interventions:  Labs, MRI/MRCP, VS, possible surgery  - See additional Care Plan goals for specific interventions  Outcome: Progressing  Goal: Patient/Family Short Term Goal  Description: Patient's Short Term Goal: pain relief  Labs, VS , Pain mangement   - See additional Care Plan goals for specific interventions  Outcome: Progressing     Problem: PAIN - ADULT  Goal: Verbalizes/displays adequate comfort level or patient's stated pain goal  Description: INTERVENTIONS:  - Encourage pt to monitor pain and request assistance  - Assess pain using appropriate pain scale  - Administer analgesics based on type and severity of pain and evaluate response  - Implement non-pharmacological measures as appropriate and evaluate response  - Consider cultural and social influences on pain and pain management  - Manage/alleviate anxiety  - Utilize distraction and/or relaxation techniques  - Monitor for opioid side effects  - Notify MD/LIP if interventions unsuccessful or patient reports new pain  - Anticipate increased pain with activity and pre-medicate as appropriate  Outcome: Progressing     Problem: RISK FOR INFECTION - ADULT  Goal: Absence of fever/infection during anticipated neutropenic period  Description: INTERVENTIONS  - Monitor WBC  - Administer growth factors as ordered  - Implement neutropenic guidelines  Outcome: Progressing     Problem: SAFETY ADULT - FALL  Goal: Free from fall injury  Description: INTERVENTIONS:  - Assess pt frequently for physical needs  - Identify cognitive and physical deficits and behaviors that affect risk of falls.   - [place] fall precautions as indicated by assessment.  - Educate pt/family on patient safety including physical limitations  - Instruct pt to call for assistance with activity based on assessment  - Modify environment to reduce risk of injury  - Provide assistive devices as appropriate  - Consider OT/PT consult to assist with strengthening/mobility  - Encourage toileting schedule  Outcome: Progressing     Problem: DISCHARGE PLANNING  Goal: Discharge to home or other facility with appropriate resources  Description: INTERVENTIONS:  - Identify barriers to discharge w/pt and caregiver  - Include patient/family/discharge partner in discharge planning  - Arrange for needed discharge resources and transportation as appropriate  - Identify discharge learning needs (meds, wound care, etc)  - Arrange for interpreters to assist at discharge as needed  - Consider post-discharge preferences of patient/family/discharge partner  - Complete POLST form as appropriate  - Assess patient's ability to be responsible for managing their own health  - Refer to Case Management Department for coordinating discharge planning if the patient needs post-hospital services based on physician/LIP order or complex needs related to functional status, cognitive ability or social support system  Outcome: Progressing

## 2022-04-17 NOTE — PLAN OF CARE
Problem: Patient/Family Goals  Goal: Patient/Family Long Term Goal  Description: Patient's Long Term Goal: to go home    Interventions:  Labs, MRI/MRCP, VS, possible surgery  - See additional Care Plan goals for specific interventions  4/17/2022 1246 by Esme Hurd RN  Outcome: Progressing  4/17/2022 1122 by Esme Hurd RN  Outcome: Progressing  Goal: Patient/Family Short Term Goal  Description: Patient's Short Term Goal: pain relief  Labs, VS , Pain mangement   - See additional Care Plan goals for specific interventions  4/17/2022 1246 by Esme Hurd RN  Outcome: Progressing  4/17/2022 1122 by Esme Hurd RN  Outcome: Progressing     Problem: PAIN - ADULT  Goal: Verbalizes/displays adequate comfort level or patient's stated pain goal  Description: INTERVENTIONS:  - Encourage pt to monitor pain and request assistance  - Assess pain using appropriate pain scale  - Administer analgesics based on type and severity of pain and evaluate response  - Implement non-pharmacological measures as appropriate and evaluate response  - Consider cultural and social influences on pain and pain management  - Manage/alleviate anxiety  - Utilize distraction and/or relaxation techniques  - Monitor for opioid side effects  - Notify MD/LIP if interventions unsuccessful or patient reports new pain  - Anticipate increased pain with activity and pre-medicate as appropriate  4/17/2022 1246 by Esme Hurd RN  Outcome: Progressing  4/17/2022 1122 by Esme Hurd RN  Outcome: Progressing     Problem: RISK FOR INFECTION - ADULT  Goal: Absence of fever/infection during anticipated neutropenic period  Description: INTERVENTIONS  - Monitor WBC  - Administer growth factors as ordered  - Implement neutropenic guidelines  4/17/2022 1246 by Esme Hurd RN  Outcome: Progressing  4/17/2022 1122 by Esme Hurd RN  Outcome: Progressing     Problem: DISCHARGE PLANNING  Goal: Discharge to home or other facility with appropriate resources  Description: INTERVENTIONS:  - Identify barriers to discharge w/pt and caregiver  - Include patient/family/discharge partner in discharge planning  - Arrange for needed discharge resources and transportation as appropriate  - Identify discharge learning needs (meds, wound care, etc)  - Arrange for interpreters to assist at discharge as needed  - Consider post-discharge preferences of patient/family/discharge partner  - Complete POLST form as appropriate  - Assess patient's ability to be responsible for managing their own health  - Refer to Case Management Department for coordinating discharge planning if the patient needs post-hospital services based on physician/LIP order or complex needs related to functional status, cognitive ability or social support system  4/17/2022 1246 by Mele Parikh, RN  Outcome: Progressing  4/17/2022 1122 by Mele Parikh, RN  Outcome: Progressing

## 2022-04-18 ENCOUNTER — PATIENT OUTREACH (OUTPATIENT)
Dept: CASE MANAGEMENT | Age: 62
End: 2022-04-18

## 2022-04-18 NOTE — PLAN OF CARE
Discharge instructions given to pt and spouse, undeerstanding  Verbalized. Pt tranmsported to irene Ross for pick-up by her spouse.

## 2022-04-18 NOTE — PLAN OF CARE
Problem: Patient/Family Goals  Goal: Patient/Family Long Term Goal  Description: Patient's Long Term Goal: to go home    Interventions:  Labs, MRI/MRCP, VS, possible surgery  - See additional Care Plan goals for specific interventions  4/17/2022 1951 by Abram Kovacs RN  Outcome: Adequate for Discharge  4/17/2022 1950 by Abram Kovacs RN  Outcome: Adequate for Discharge  4/17/2022 1246 by Abram Kovacs RN  Outcome: Progressing  4/17/2022 1122 by Abram Kovacs RN  Outcome: Progressing  Goal: Patient/Family Short Term Goal  Description: Patient's Short Term Goal: pain relief  Labs, VS , Pain mangement   - See additional Care Plan goals for specific interventions  4/17/2022 1951 by Abram Kovacs RN  Outcome: Adequate for Discharge  4/17/2022 1950 by Abram Kovacs RN  Outcome: Adequate for Discharge  4/17/2022 1246 by Abram Kovacs RN  Outcome: Progressing  4/17/2022 1122 by Abram Kovacs RN  Outcome: Progressing     Problem: PAIN - ADULT  Goal: Verbalizes/displays adequate comfort level or patient's stated pain goal  Description: INTERVENTIONS:  - Encourage pt to monitor pain and request assistance  - Assess pain using appropriate pain scale  - Administer analgesics based on type and severity of pain and evaluate response  - Implement non-pharmacological measures as appropriate and evaluate response  - Consider cultural and social influences on pain and pain management  - Manage/alleviate anxiety  - Utilize distraction and/or relaxation techniques  - Monitor for opioid side effects  - Notify MD/LIP if interventions unsuccessful or patient reports new pain  - Anticipate increased pain with activity and pre-medicate as appropriate  4/17/2022 1951 by Abram Kovacs RN  Outcome: Adequate for Discharge  4/17/2022 1950 by Abram Kovacs RN  Outcome: Adequate for Discharge  4/17/2022 1246 by Abram Kovacs RN  Outcome: Progressing  4/17/2022 1122 by Taryn Rasmussen Avelina Andrea RN  Outcome: Progressing     Problem: RISK FOR INFECTION - ADULT  Goal: Absence of fever/infection during anticipated neutropenic period  Description: INTERVENTIONS  - Monitor WBC  - Administer growth factors as ordered  - Implement neutropenic guidelines  4/17/2022 1951 by Dahlia Dickerson RN  Outcome: Adequate for Discharge  4/17/2022 1950 by Dahlia Dickerson RN  Outcome: Adequate for Discharge  4/17/2022 1246 by Dahlia Dickerson RN  Outcome: Progressing  4/17/2022 1122 by Dahlia Dickerson RN  Outcome: Progressing     Problem: SAFETY ADULT - FALL  Goal: Free from fall injury  Description: INTERVENTIONS:  - Assess pt frequently for physical needs  - Identify cognitive and physical deficits and behaviors that affect risk of falls.   - [place] fall precautions as indicated by assessment.  - Educate pt/family on patient safety including physical limitations  - Instruct pt to call for assistance with activity based on assessment  - Modify environment to reduce risk of injury  - Provide assistive devices as appropriate  - Consider OT/PT consult to assist with strengthening/mobility  - Encourage toileting schedule  4/17/2022 1951 by Dahlia Dickerson RN  Outcome: Adequate for Discharge  4/17/2022 1950 by Dahlia Dickerson RN  Outcome: Adequate for Discharge  4/17/2022 1246 by Dahlia Dickerson RN  Outcome: Progressing  4/17/2022 1122 by Dahlia Dickerson RN  Outcome: Progressing     Problem: DISCHARGE PLANNING  Goal: Discharge to home or other facility with appropriate resources  Description: INTERVENTIONS:  - Identify barriers to discharge w/pt and caregiver  - Include patient/family/discharge partner in discharge planning  - Arrange for needed discharge resources and transportation as appropriate  - Identify discharge learning needs (meds, wound care, etc)  - Arrange for interpreters to assist at discharge as needed  - Consider post-discharge preferences of patient/family/discharge partner  - Complete POLST form as appropriate  - Assess patient's ability to be responsible for managing their own health  - Refer to Case Management Department for coordinating discharge planning if the patient needs post-hospital services based on physician/LIP order or complex needs related to functional status, cognitive ability or social support system  4/17/2022 1951 by Aura Gonzalez, RN  Outcome: Adequate for Discharge  4/17/2022 1950 by Aura Gonzalez RN  Outcome: Adequate for Discharge  4/17/2022 1246 by Aura Gonzalez RN  Outcome: Progressing  4/17/2022 1122 by Aura Gonzalez RN  Outcome: Progressing

## 2022-04-19 ENCOUNTER — TELEPHONE (OUTPATIENT)
Dept: FAMILY MEDICINE CLINIC | Facility: CLINIC | Age: 62
End: 2022-04-19

## 2022-04-19 ENCOUNTER — PATIENT OUTREACH (OUTPATIENT)
Dept: CASE MANAGEMENT | Age: 62
End: 2022-04-19

## 2022-04-19 NOTE — TELEPHONE ENCOUNTER
Future Appointments   Date Time Provider Rolan Miles   4/26/2022  2:30 PM CHANCE Caruso [facility] EMG General     BOOK BY DATE (last date for TCM): 05/01/2022    Left message for patient to call office back. Office phone number provided.

## 2022-04-19 NOTE — TELEPHONE ENCOUNTER
Pt discharged 04/17/2022, abdominal pain. Pt does not have HFU appt scheduled at this time. TCM/HFU appt recommended by 04/24/2022 as pt is a high risk for readmission. Please discuss with PCP and contact pt accordingly. Thank you! NC offered patient TCM/HFU, patient declined stating will call office once follows up with general surgery, to schedule with PCP.      BOOK BY DATE (last date for TCM): 05/01/2022

## 2022-04-21 NOTE — TELEPHONE ENCOUNTER
Future Appointments   Date Time Provider Rolan Miles   4/26/2022  2:30 PM Casi Jin [facility] EMG General   4/29/2022  1:00 PM Pravin Vaz MD [facility] EMG [facility]

## 2022-04-22 LAB — ANA SER QL: NEGATIVE

## 2022-04-26 ENCOUNTER — OFFICE VISIT (OUTPATIENT)
Dept: SURGERY | Facility: CLINIC | Age: 62
End: 2022-04-26

## 2022-04-26 VITALS
TEMPERATURE: 97 F | SYSTOLIC BLOOD PRESSURE: 139 MMHG | WEIGHT: 176 LBS | HEIGHT: 66 IN | BODY MASS INDEX: 28.28 KG/M2 | DIASTOLIC BLOOD PRESSURE: 60 MMHG

## 2022-04-26 DIAGNOSIS — Z98.890 POST-OPERATIVE STATE: ICD-10-CM

## 2022-04-26 DIAGNOSIS — Z90.49 STATUS POST LAPAROSCOPIC CHOLECYSTECTOMY: Primary | ICD-10-CM

## 2022-04-26 PROCEDURE — 3008F BODY MASS INDEX DOCD: CPT

## 2022-04-26 PROCEDURE — 3075F SYST BP GE 130 - 139MM HG: CPT

## 2022-04-26 PROCEDURE — 99024 POSTOP FOLLOW-UP VISIT: CPT

## 2022-04-26 PROCEDURE — 3078F DIAST BP <80 MM HG: CPT

## 2022-04-29 ENCOUNTER — OFFICE VISIT (OUTPATIENT)
Dept: FAMILY MEDICINE CLINIC | Facility: CLINIC | Age: 62
End: 2022-04-29
Payer: COMMERCIAL

## 2022-04-29 VITALS
BODY MASS INDEX: 28 KG/M2 | TEMPERATURE: 98 F | SYSTOLIC BLOOD PRESSURE: 120 MMHG | DIASTOLIC BLOOD PRESSURE: 68 MMHG | WEIGHT: 174.38 LBS | HEART RATE: 105 BPM | RESPIRATION RATE: 16 BRPM | OXYGEN SATURATION: 97 %

## 2022-04-29 DIAGNOSIS — Z90.49 S/P LAPAROSCOPIC CHOLECYSTECTOMY: Primary | ICD-10-CM

## 2022-04-29 DIAGNOSIS — Z09 HOSPITAL DISCHARGE FOLLOW-UP: ICD-10-CM

## 2022-04-29 DIAGNOSIS — K59.1 FUNCTIONAL DIARRHEA: ICD-10-CM

## 2022-04-29 PROCEDURE — 3078F DIAST BP <80 MM HG: CPT | Performed by: FAMILY MEDICINE

## 2022-04-29 PROCEDURE — 99495 TRANSJ CARE MGMT MOD F2F 14D: CPT | Performed by: FAMILY MEDICINE

## 2022-04-29 PROCEDURE — 3074F SYST BP LT 130 MM HG: CPT | Performed by: FAMILY MEDICINE

## 2022-04-29 RX ORDER — CHOLESTYRAMINE 4 G/9G
1 POWDER, FOR SUSPENSION ORAL 2 TIMES DAILY WITH MEALS
Qty: 15 PACKET | Refills: 0 | Status: SHIPPED | OUTPATIENT
Start: 2022-04-29 | End: 2022-05-29

## 2022-05-12 ENCOUNTER — TELEPHONE (OUTPATIENT)
Dept: FAMILY MEDICINE CLINIC | Facility: CLINIC | Age: 62
End: 2022-05-12

## 2022-05-12 NOTE — TELEPHONE ENCOUNTER
Advised pt - per CDC - ELIGIBLE FOR 2 Boosters - Adults ages 48 years and older    Pt v/u, no further questions at this time

## 2022-05-25 RX ORDER — CHOLESTYRAMINE 4 G/9G
POWDER, FOR SUSPENSION ORAL
Qty: 15 PACKET | Refills: 0 | Status: SHIPPED | OUTPATIENT
Start: 2022-05-25

## 2022-05-25 NOTE — TELEPHONE ENCOUNTER
LOV: 04/29/22   Last Refill: 04/29/22  #15 0 RF    Future Appointments   Date Time Provider Rolan Miles   6/16/2022 12:00 PM ERIN, PROCEDURE SGIEDW None

## 2022-05-28 DIAGNOSIS — E11.22 TYPE 2 DIABETES MELLITUS WITH DIABETIC CHRONIC KIDNEY DISEASE (HCC): ICD-10-CM

## 2022-05-31 ENCOUNTER — OFFICE VISIT (OUTPATIENT)
Dept: FAMILY MEDICINE CLINIC | Facility: CLINIC | Age: 62
End: 2022-05-31
Payer: COMMERCIAL

## 2022-05-31 VITALS
DIASTOLIC BLOOD PRESSURE: 80 MMHG | TEMPERATURE: 97 F | HEART RATE: 110 BPM | WEIGHT: 177 LBS | SYSTOLIC BLOOD PRESSURE: 126 MMHG | OXYGEN SATURATION: 97 % | BODY MASS INDEX: 29 KG/M2

## 2022-05-31 DIAGNOSIS — H65.91 FLUID LEVEL BEHIND TYMPANIC MEMBRANE OF RIGHT EAR: Primary | ICD-10-CM

## 2022-05-31 DIAGNOSIS — R44.8 FACIAL PRESSURE: ICD-10-CM

## 2022-05-31 DIAGNOSIS — R74.8 ELEVATED LIVER ENZYMES: ICD-10-CM

## 2022-05-31 DIAGNOSIS — Z90.49 S/P LAPAROSCOPIC CHOLECYSTECTOMY: ICD-10-CM

## 2022-05-31 PROCEDURE — 3079F DIAST BP 80-89 MM HG: CPT | Performed by: FAMILY MEDICINE

## 2022-05-31 PROCEDURE — 99213 OFFICE O/P EST LOW 20 MIN: CPT | Performed by: FAMILY MEDICINE

## 2022-05-31 PROCEDURE — 3074F SYST BP LT 130 MM HG: CPT | Performed by: FAMILY MEDICINE

## 2022-05-31 RX ORDER — SEMAGLUTIDE 1.34 MG/ML
1 INJECTION, SOLUTION SUBCUTANEOUS
Qty: 3 ML | Refills: 3 | Status: SHIPPED | OUTPATIENT
Start: 2022-05-31 | End: 2022-08-29

## 2022-05-31 RX ORDER — METHYLPREDNISOLONE 4 MG/1
TABLET ORAL
Qty: 21 EACH | Refills: 0 | Status: SHIPPED | OUTPATIENT
Start: 2022-05-31 | End: 2022-06-06

## 2022-05-31 NOTE — TELEPHONE ENCOUNTER
Last refilled 3/7/22 for 3 ml with 2 RF  LOV with MM 4/29/22  No future appt with pcp  Protocol: none

## 2022-06-02 ENCOUNTER — TELEPHONE (OUTPATIENT)
Dept: FAMILY MEDICINE CLINIC | Facility: CLINIC | Age: 62
End: 2022-06-02

## 2022-06-02 RX ORDER — CHOLESTYRAMINE 4 G/9G
0.5 POWDER, FOR SUSPENSION ORAL 2 TIMES DAILY WITH MEALS
Qty: 30 PACKET | Refills: 0 | Status: SHIPPED | OUTPATIENT
Start: 2022-06-02 | End: 2022-07-02

## 2022-06-02 NOTE — TELEPHONE ENCOUNTER
PHARMACY CALLED AND ADV NEED TO CHANGE SCRIPT QUANTITY.     CHOLESTYRAMINE 4 g Oral Powd Pack    ** NEEDS TO BE 45 PACKET - 90 DAY SUPPLY **       PLEASE ADV    [facility]     THANK YOU

## 2022-06-08 LAB
ALBUMIN/GLOBULIN RATIO: 2 (CALC) (ref 1–2.5)
ALBUMIN: 4.3 G/DL (ref 3.6–5.1)
ALKALINE PHOSPHATASE: 111 U/L (ref 37–153)
ALT: 24 U/L (ref 6–29)
AST: 16 U/L (ref 10–35)
BILIRUBIN, DIRECT: 0.1 MG/DL
BILIRUBIN, INDIRECT: 0.6 MG/DL (CALC) (ref 0.2–1.2)
BILIRUBIN, TOTAL: 0.7 MG/DL (ref 0.2–1.2)
GLOBULIN: 2.2 G/DL (CALC) (ref 1.9–3.7)
PROTEIN, TOTAL: 6.5 G/DL (ref 6.1–8.1)

## 2022-06-12 DIAGNOSIS — E78.5 HYPERLIPIDEMIA ASSOCIATED WITH TYPE 2 DIABETES MELLITUS (HCC): ICD-10-CM

## 2022-06-12 DIAGNOSIS — I10 ESSENTIAL HYPERTENSION, BENIGN: ICD-10-CM

## 2022-06-12 DIAGNOSIS — E11.69 HYPERLIPIDEMIA ASSOCIATED WITH TYPE 2 DIABETES MELLITUS (HCC): ICD-10-CM

## 2022-06-13 RX ORDER — METFORMIN HYDROCHLORIDE 750 MG/1
TABLET, EXTENDED RELEASE ORAL
Qty: 90 TABLET | Refills: 0 | Status: SHIPPED | OUTPATIENT
Start: 2022-06-13

## 2022-06-13 RX ORDER — GLIPIZIDE 10 MG/1
TABLET, FILM COATED, EXTENDED RELEASE ORAL
Qty: 180 TABLET | Refills: 0 | Status: SHIPPED | OUTPATIENT
Start: 2022-06-13

## 2022-06-13 RX ORDER — OMEPRAZOLE 40 MG/1
CAPSULE, DELAYED RELEASE ORAL
Qty: 90 CAPSULE | Refills: 0 | Status: SHIPPED | OUTPATIENT
Start: 2022-06-13

## 2022-06-13 NOTE — TELEPHONE ENCOUNTER
Diabetic Medication Protocol Failed 06/13/2022 06:23 AM   Protocol Details  Last HgBA1C < 7.5    HgBA1C procedure resulted in past 6 months    Microalbumin procedure in past 12 months or taking ACE/ARB    Appointment in past 6 or next 3 months     Routing to provider per protocol. METFORMIN HCL  MG Oral Tablet 24 Hr  Last refilled on 3/17/22 for #90  with 0 rf. OMEPRAZOLE 40 MG Oral Capsule Delayed Release  Last refilled on 3/17/22 for #90  with 0 rf. GLIPIZIDE ER 10 MG Oral Tablet 24 Hr  Last refilled on 3/17/22 for #180  with 0 rf. Last labs 4/7/22. Last seen on 5/31/22. Future Appointments   Date Time Provider Rolan Miles   6/14/2022 11:15 AM OS [facility] RESOURCE OS LAB [place]   6/16/2022 12:00 PM ERIN, PROCEDURE SGIEDW None          Thank you.    4

## 2022-06-14 ENCOUNTER — LAB ENCOUNTER (OUTPATIENT)
Dept: LAB | Age: 62
End: 2022-06-14
Attending: INTERNAL MEDICINE
Payer: COMMERCIAL

## 2022-06-14 DIAGNOSIS — Z01.812 ENCOUNTER FOR PREOPERATIVE SCREENING LABORATORY TESTING FOR COVID-19 VIRUS: ICD-10-CM

## 2022-06-14 DIAGNOSIS — Z20.822 ENCOUNTER FOR PREOPERATIVE SCREENING LABORATORY TESTING FOR COVID-19 VIRUS: ICD-10-CM

## 2022-06-15 LAB — SARS-COV-2 RNA RESP QL NAA+PROBE: NOT DETECTED

## 2022-06-16 ENCOUNTER — HOSPITAL ENCOUNTER (OUTPATIENT)
Facility: HOSPITAL | Age: 62
Setting detail: HOSPITAL OUTPATIENT SURGERY
Discharge: HOME | End: 2022-06-16
Attending: INTERNAL MEDICINE | Admitting: INTERNAL MEDICINE
Payer: COMMERCIAL

## 2022-06-16 ENCOUNTER — APPOINTMENT (OUTPATIENT)
Dept: GENERAL RADIOLOGY | Facility: HOSPITAL | Age: 62
End: 2022-06-16
Attending: INTERNAL MEDICINE
Payer: COMMERCIAL

## 2022-06-16 ENCOUNTER — ANESTHESIA EVENT (OUTPATIENT)
Dept: ENDOSCOPY | Facility: HOSPITAL | Age: 62
End: 2022-06-16
Payer: COMMERCIAL

## 2022-06-16 ENCOUNTER — ANESTHESIA (OUTPATIENT)
Dept: ENDOSCOPY | Facility: HOSPITAL | Age: 62
End: 2022-06-16
Payer: COMMERCIAL

## 2022-06-16 VITALS
BODY MASS INDEX: 28.28 KG/M2 | SYSTOLIC BLOOD PRESSURE: 133 MMHG | HEART RATE: 80 BPM | HEIGHT: 66 IN | TEMPERATURE: 97 F | WEIGHT: 176 LBS | DIASTOLIC BLOOD PRESSURE: 69 MMHG | RESPIRATION RATE: 20 BRPM | OXYGEN SATURATION: 97 %

## 2022-06-16 DIAGNOSIS — E78.5 HYPERLIPIDEMIA ASSOCIATED WITH TYPE 2 DIABETES MELLITUS (HCC): ICD-10-CM

## 2022-06-16 DIAGNOSIS — R10.9 ABDOMINAL PAIN, UNSPECIFIED ABDOMINAL LOCATION: ICD-10-CM

## 2022-06-16 DIAGNOSIS — I10 ESSENTIAL HYPERTENSION, BENIGN: ICD-10-CM

## 2022-06-16 DIAGNOSIS — Z01.812 ENCOUNTER FOR PREOPERATIVE SCREENING LABORATORY TESTING FOR COVID-19 VIRUS: Primary | ICD-10-CM

## 2022-06-16 DIAGNOSIS — Z20.822 ENCOUNTER FOR PREOPERATIVE SCREENING LABORATORY TESTING FOR COVID-19 VIRUS: Primary | ICD-10-CM

## 2022-06-16 DIAGNOSIS — E11.69 HYPERLIPIDEMIA ASSOCIATED WITH TYPE 2 DIABETES MELLITUS (HCC): ICD-10-CM

## 2022-06-16 LAB — GLUCOSE BLD-MCNC: 138 MG/DL (ref 70–99)

## 2022-06-16 PROCEDURE — 0FPB8DZ REMOVAL OF INTRALUMINAL DEVICE FROM HEPATOBILIARY DUCT, VIA NATURAL OR ARTIFICIAL OPENING ENDOSCOPIC: ICD-10-PCS | Performed by: INTERNAL MEDICINE

## 2022-06-16 PROCEDURE — 82962 GLUCOSE BLOOD TEST: CPT

## 2022-06-16 PROCEDURE — 74328 X-RAY BILE DUCT ENDOSCOPY: CPT | Performed by: INTERNAL MEDICINE

## 2022-06-16 PROCEDURE — 0FC98ZZ EXTIRPATION OF MATTER FROM COMMON BILE DUCT, VIA NATURAL OR ARTIFICIAL OPENING ENDOSCOPIC: ICD-10-PCS | Performed by: INTERNAL MEDICINE

## 2022-06-16 RX ORDER — DEXTROSE MONOHYDRATE 25 G/50ML
50 INJECTION, SOLUTION INTRAVENOUS
Status: DISCONTINUED | OUTPATIENT
Start: 2022-06-16 | End: 2022-06-16

## 2022-06-16 RX ORDER — SODIUM CHLORIDE, SODIUM LACTATE, POTASSIUM CHLORIDE, CALCIUM CHLORIDE 600; 310; 30; 20 MG/100ML; MG/100ML; MG/100ML; MG/100ML
INJECTION, SOLUTION INTRAVENOUS CONTINUOUS
Status: DISCONTINUED | OUTPATIENT
Start: 2022-06-16 | End: 2022-06-16

## 2022-06-16 RX ORDER — NICOTINE POLACRILEX 4 MG
30 LOZENGE BUCCAL
Status: DISCONTINUED | OUTPATIENT
Start: 2022-06-16 | End: 2022-06-16

## 2022-06-16 RX ORDER — NALOXONE HYDROCHLORIDE 0.4 MG/ML
80 INJECTION, SOLUTION INTRAMUSCULAR; INTRAVENOUS; SUBCUTANEOUS AS NEEDED
Status: DISCONTINUED | OUTPATIENT
Start: 2022-06-16 | End: 2022-06-16

## 2022-06-16 RX ORDER — ONDANSETRON 2 MG/ML
4 INJECTION INTRAMUSCULAR; INTRAVENOUS EVERY 6 HOURS PRN
Status: DISCONTINUED | OUTPATIENT
Start: 2022-06-16 | End: 2022-06-16

## 2022-06-16 RX ORDER — NICOTINE POLACRILEX 4 MG
15 LOZENGE BUCCAL
Status: DISCONTINUED | OUTPATIENT
Start: 2022-06-16 | End: 2022-06-16

## 2022-06-16 RX ORDER — DAPAGLIFLOZIN 10 MG/1
TABLET, FILM COATED ORAL
Qty: 90 TABLET | Refills: 0 | Status: SHIPPED | OUTPATIENT
Start: 2022-06-16

## 2022-06-16 RX ADMIN — SODIUM CHLORIDE, SODIUM LACTATE, POTASSIUM CHLORIDE, CALCIUM CHLORIDE: 600; 310; 30; 20 INJECTION, SOLUTION INTRAVENOUS at 12:33:00

## 2022-06-16 NOTE — ANESTHESIA POSTPROCEDURE EVALUATION
[address] Patient Status:  Hospital Outpatient Surgery   Age/Gender 57year old female MRN [id number]   Location [address] [facility] 28 Attending Cat Mcfarland MD   Hosp Day # 0 PCP Pravin Person MD       Anesthesia Post-op Note    ENDOSCOPIC RETROGRADE CHOLANGIOPANCREATOGRAPHY (ERCP) w/ Biliary stent removal, balloon sweep    Procedure Summary     Date: 06/16/22 Room / Location: [facility] ENDOSCOPY 02 / [facility] ENDOSCOPY    Anesthesia Start: 5471 Anesthesia Stop: 4835    Procedure: ENDOSCOPIC RETROGRADE CHOLANGIOPANCREATOGRAPHY (ERCP) w/ Biliary stent removal, balloon sweep (N/A ) Diagnosis:       Abdominal pain, unspecified abdominal location      (cholelithiasis)    Surgeons: Cat Mcfarland MD Anesthesiologist: Efrain Kendrick DO    Anesthesia Type: MAC ASA Status: 2          Anesthesia Type: MAC    Vitals Value Taken Time   /69 06/16/22 1234   Temp  06/16/22 1234   Pulse 87 06/16/22 1234   Resp 16 06/16/22 1234   SpO2 94 06/16/22 1234       Patient Location: PACU    Anesthesia Type: MAC    Airway Patency: patent    Postop Pain Control: adequate    Mental Status: preanesthetic baseline    Nausea/Vomiting: none    Cardiopulmonary/Hydration status: stable euvolemic    Complications: no apparent anesthesia related complications    Postop vital signs: stable    Dental Exam: Unchanged from Preop    Patient to be discharged from PACU when criteria met.

## 2022-06-16 NOTE — TELEPHONE ENCOUNTER
Diabetic Medication Protocol Failed 06/16/2022 04:30 PM   Protocol Details  Last HgBA1C < 7.5    HgBA1C procedure resulted in past 6 months    Microalbumin procedure in past 12 months or taking ACE/ARB    Appointment in past 6 or next 3 months        Routing to provider per protocol. FARXIGA 10 MG Oral Tab  Last refilled on 3/17/22 for #90  with 0 rf. Last labs 4/7/22. Last seen on 5/31/22. No future appointments. Thank you.

## 2022-07-07 DIAGNOSIS — E11.69 HYPERLIPIDEMIA ASSOCIATED WITH TYPE 2 DIABETES MELLITUS (HCC): ICD-10-CM

## 2022-07-07 DIAGNOSIS — I10 ESSENTIAL HYPERTENSION, BENIGN: ICD-10-CM

## 2022-07-07 DIAGNOSIS — E78.5 HYPERLIPIDEMIA ASSOCIATED WITH TYPE 2 DIABETES MELLITUS (HCC): ICD-10-CM

## 2022-07-07 RX ORDER — LOSARTAN POTASSIUM AND HYDROCHLOROTHIAZIDE 12.5; 5 MG/1; MG/1
TABLET ORAL
Qty: 90 TABLET | Refills: 1 | Status: SHIPPED | OUTPATIENT
Start: 2022-07-07

## 2022-07-19 ENCOUNTER — OFFICE VISIT (OUTPATIENT)
Dept: FAMILY MEDICINE CLINIC | Facility: CLINIC | Age: 62
End: 2022-07-19
Payer: COMMERCIAL

## 2022-07-19 VITALS
OXYGEN SATURATION: 99 % | BODY MASS INDEX: 28.13 KG/M2 | HEIGHT: 66 IN | WEIGHT: 175 LBS | TEMPERATURE: 97 F | HEART RATE: 97 BPM | RESPIRATION RATE: 16 BRPM

## 2022-07-19 DIAGNOSIS — J01.10 ACUTE NON-RECURRENT FRONTAL SINUSITIS: Primary | ICD-10-CM

## 2022-07-19 PROCEDURE — 99213 OFFICE O/P EST LOW 20 MIN: CPT | Performed by: NURSE PRACTITIONER

## 2022-07-19 PROCEDURE — 3008F BODY MASS INDEX DOCD: CPT | Performed by: NURSE PRACTITIONER

## 2022-07-19 RX ORDER — PREDNISONE 20 MG/1
40 TABLET ORAL DAILY
Qty: 10 TABLET | Refills: 0 | Status: SHIPPED | OUTPATIENT
Start: 2022-07-19 | End: 2022-07-24

## 2022-07-19 RX ORDER — DOXYCYCLINE HYCLATE 100 MG/1
100 CAPSULE ORAL 2 TIMES DAILY
Qty: 20 CAPSULE | Refills: 0 | Status: SHIPPED | OUTPATIENT
Start: 2022-07-19 | End: 2022-07-29

## 2022-08-12 RX ORDER — BUPROPION HYDROCHLORIDE 75 MG/1
TABLET ORAL
Qty: 180 TABLET | Refills: 0 | Status: SHIPPED | OUTPATIENT
Start: 2022-08-12

## 2022-08-12 NOTE — TELEPHONE ENCOUNTER
PT CALLED AND ADV ONLY HAS A FEW PILLS LEFT. WILL NEED REFILL BEFORE THE WEEKEND IS OVER. .    [facility]

## 2022-08-12 NOTE — TELEPHONE ENCOUNTER
Routing to provider per protocol. BUPROPION 75 MG Oral Tab  Last refilled on 11/2/21 for #180  with 0 rf. Last labs 6/7/22. Last seen on 5/31/22. No future appointments. Thank you.

## 2022-08-31 DIAGNOSIS — E78.5 HYPERLIPIDEMIA ASSOCIATED WITH TYPE 2 DIABETES MELLITUS (HCC): ICD-10-CM

## 2022-08-31 DIAGNOSIS — E11.69 HYPERLIPIDEMIA ASSOCIATED WITH TYPE 2 DIABETES MELLITUS (HCC): ICD-10-CM

## 2022-08-31 RX ORDER — ATORVASTATIN CALCIUM 40 MG/1
TABLET, FILM COATED ORAL
Qty: 90 TABLET | Refills: 1 | Status: SHIPPED | OUTPATIENT
Start: 2022-08-31

## 2022-08-31 NOTE — TELEPHONE ENCOUNTER
Cholesterol Medication Protocol Passed 08/31/2022 12:28 AM   Protocol Details  ALT < 80    ALT resulted within past year    Lipid panel within past 12 months    Appointment within past 12 or next 3 months     Refilled per protocol  atorvastatin 40 MG Oral Tab  Last refilled on 9/3/22 #90 with 1 rf.   LOV- 5/31/22  Last labs- 4/7/22    Sent to pharmacy

## 2022-09-10 DIAGNOSIS — E11.69 HYPERLIPIDEMIA ASSOCIATED WITH TYPE 2 DIABETES MELLITUS (HCC): ICD-10-CM

## 2022-09-10 DIAGNOSIS — I10 ESSENTIAL HYPERTENSION, BENIGN: ICD-10-CM

## 2022-09-10 DIAGNOSIS — E78.5 HYPERLIPIDEMIA ASSOCIATED WITH TYPE 2 DIABETES MELLITUS (HCC): ICD-10-CM

## 2022-09-12 RX ORDER — METFORMIN HYDROCHLORIDE 750 MG/1
TABLET, EXTENDED RELEASE ORAL
Qty: 90 TABLET | Refills: 0 | Status: SHIPPED | OUTPATIENT
Start: 2022-09-12

## 2022-09-12 RX ORDER — OMEPRAZOLE 40 MG/1
CAPSULE, DELAYED RELEASE ORAL
Qty: 90 CAPSULE | Refills: 0 | Status: SHIPPED | OUTPATIENT
Start: 2022-09-12

## 2022-09-12 RX ORDER — GLIPIZIDE 10 MG/1
TABLET, FILM COATED, EXTENDED RELEASE ORAL
Qty: 180 TABLET | Refills: 0 | Status: SHIPPED | OUTPATIENT
Start: 2022-09-12

## 2022-09-12 NOTE — TELEPHONE ENCOUNTER
Please schedule diabetic follow up visit ASAP. Past due. Needs to be seen prior to future refills. Current refills sent.

## 2022-09-12 NOTE — TELEPHONE ENCOUNTER
Diabetic Medication Protocol Failed 09/12/2022 06:05 AM   Protocol Details  Last HgBA1C < 7.5    Microalbumin procedure in past 12 months or taking ACE/ARB    HgBA1C procedure resulted in past 6 months    Appointment in past 6 or next 3 months        Routing to provider per protocol. GLIPIZIDE ER 10 MG Oral Tablet 24 Hr  Last refilled on 6/13/22 for #180  with 0 rf. METFORMIN  MG Oral Tablet 24 Hr  Last refilled on 6/13/22 for #90  with 0 rf. OMEPRAZOLE 40 MG Oral Capsule Delayed Release  Last refilled on 6/13/22 for #90  with 0 rf. Last labs 4/7/22. Last seen on 5/31/22. No future appointments. Thank you.

## 2022-09-23 DIAGNOSIS — E78.5 HYPERLIPIDEMIA ASSOCIATED WITH TYPE 2 DIABETES MELLITUS (HCC): ICD-10-CM

## 2022-09-23 DIAGNOSIS — I10 ESSENTIAL HYPERTENSION, BENIGN: ICD-10-CM

## 2022-09-23 DIAGNOSIS — E11.69 HYPERLIPIDEMIA ASSOCIATED WITH TYPE 2 DIABETES MELLITUS (HCC): ICD-10-CM

## 2022-09-24 NOTE — TELEPHONE ENCOUNTER
Pt failed refill protocol for the following reasons:   Diabetic Medication Protocol Failed 09/24/2022 10:08 AM   Protocol Details  Last HgBA1C < 7.5    Microalbumin procedure in past 12 months or taking ACE/ARB              Last refill: 6/16/2022 #90 with 0 refills  Last appt: 5/31/2022  Next appt: No future appointments. Forward to Dr. Inge Murcia, please advise on refills. Thank you.

## 2022-09-26 DIAGNOSIS — E11.22 TYPE 2 DIABETES MELLITUS WITH DIABETIC CHRONIC KIDNEY DISEASE (HCC): ICD-10-CM

## 2022-09-26 RX ORDER — DAPAGLIFLOZIN 10 MG/1
TABLET, FILM COATED ORAL
Qty: 90 TABLET | Refills: 0 | Status: SHIPPED | OUTPATIENT
Start: 2022-09-26

## 2022-09-26 NOTE — TELEPHONE ENCOUNTER
Pt needs a 3 month supply of:    Semaglutide, 1 MG/DOSE, (OZEMPIC, 1 MG/DOSE,) 4 MG/3ML Subcutaneous Solution Pen-injector [[id number]] (Order [id number]      [facility] [address] [place] [id number], [address] [place] [address]   Phone: [phone] Fax: [phone]

## 2022-09-27 ENCOUNTER — PATIENT MESSAGE (OUTPATIENT)
Dept: FAMILY MEDICINE CLINIC | Facility: CLINIC | Age: 62
End: 2022-09-27

## 2022-09-27 ENCOUNTER — TELEPHONE (OUTPATIENT)
Dept: FAMILY MEDICINE CLINIC | Facility: CLINIC | Age: 62
End: 2022-09-27

## 2022-09-27 RX ORDER — SEMAGLUTIDE 1.34 MG/ML
1 INJECTION, SOLUTION SUBCUTANEOUS
Qty: 3 ML | Refills: 3 | Status: SHIPPED | OUTPATIENT
Start: 2022-09-27 | End: 2022-12-26

## 2022-09-27 NOTE — TELEPHONE ENCOUNTER
Diabetic labs / urine ordered   Please have patient schedule visit with [facility] to get this done. Will need this and her diabetic exam done for future refills. Thank you.

## 2022-09-30 ENCOUNTER — OFFICE VISIT (OUTPATIENT)
Dept: FAMILY MEDICINE CLINIC | Facility: CLINIC | Age: 62
End: 2022-09-30

## 2022-09-30 VITALS
HEART RATE: 110 BPM | SYSTOLIC BLOOD PRESSURE: 136 MMHG | TEMPERATURE: 98 F | DIASTOLIC BLOOD PRESSURE: 76 MMHG | RESPIRATION RATE: 20 BRPM | OXYGEN SATURATION: 98 %

## 2022-09-30 DIAGNOSIS — B37.9 ANTIBIOTIC-INDUCED YEAST INFECTION: ICD-10-CM

## 2022-09-30 DIAGNOSIS — J01.40 ACUTE PANSINUSITIS, RECURRENCE NOT SPECIFIED: Primary | ICD-10-CM

## 2022-09-30 DIAGNOSIS — T36.95XA ANTIBIOTIC-INDUCED YEAST INFECTION: ICD-10-CM

## 2022-09-30 PROCEDURE — 3075F SYST BP GE 130 - 139MM HG: CPT | Performed by: NURSE PRACTITIONER

## 2022-09-30 PROCEDURE — 3078F DIAST BP <80 MM HG: CPT | Performed by: NURSE PRACTITIONER

## 2022-09-30 PROCEDURE — 99213 OFFICE O/P EST LOW 20 MIN: CPT | Performed by: NURSE PRACTITIONER

## 2022-09-30 RX ORDER — PREDNISONE 20 MG/1
40 TABLET ORAL DAILY
Qty: 10 TABLET | Refills: 0 | Status: SHIPPED | OUTPATIENT
Start: 2022-09-30 | End: 2022-10-05

## 2022-09-30 RX ORDER — FLUCONAZOLE 150 MG/1
150 TABLET ORAL ONCE
Qty: 1 TABLET | Refills: 0 | Status: SHIPPED | OUTPATIENT
Start: 2022-09-30 | End: 2022-09-30

## 2022-09-30 RX ORDER — DOXYCYCLINE HYCLATE 100 MG
100 TABLET ORAL 2 TIMES DAILY
Qty: 20 TABLET | Refills: 0 | Status: SHIPPED | OUTPATIENT
Start: 2022-09-30 | End: 2022-10-10

## 2022-11-04 RX ORDER — BUPROPION HYDROCHLORIDE 75 MG/1
TABLET ORAL
Qty: 180 TABLET | Refills: 0 | Status: SHIPPED | OUTPATIENT
Start: 2022-11-04

## 2022-11-04 NOTE — TELEPHONE ENCOUNTER
Medication last refilled 8/12/22 for #180 with 0 RF  LOV with MM 5/31/22  No future appt  Protocol: none

## 2022-12-02 ENCOUNTER — OFFICE VISIT (OUTPATIENT)
Dept: FAMILY MEDICINE CLINIC | Facility: CLINIC | Age: 62
End: 2022-12-02
Payer: COMMERCIAL

## 2022-12-02 VITALS
WEIGHT: 175 LBS | DIASTOLIC BLOOD PRESSURE: 77 MMHG | BODY MASS INDEX: 29.16 KG/M2 | RESPIRATION RATE: 18 BRPM | OXYGEN SATURATION: 96 % | TEMPERATURE: 98 F | SYSTOLIC BLOOD PRESSURE: 153 MMHG | HEIGHT: 65 IN | HEART RATE: 124 BPM

## 2022-12-02 DIAGNOSIS — R03.0 ELEVATED BLOOD PRESSURE READING: ICD-10-CM

## 2022-12-02 DIAGNOSIS — R39.9 UTI SYMPTOMS: Primary | ICD-10-CM

## 2022-12-02 LAB
BILIRUBIN: NEGATIVE
GLUCOSE (URINE DIPSTICK): >=1000 MG/DL
KETONES (URINE DIPSTICK): NEGATIVE MG/DL
MULTISTIX LOT#: ABNORMAL NUMERIC
NITRITE, URINE: NEGATIVE
PH, URINE: 7 (ref 4.5–8)
PROTEIN (URINE DIPSTICK): 100 MG/DL
SPECIFIC GRAVITY: 1.01 (ref 1–1.03)
UROBILINOGEN,SEMI-QN: 1 MG/DL (ref 0–1.9)

## 2022-12-02 PROCEDURE — 87186 SC STD MICRODIL/AGAR DIL: CPT | Performed by: NURSE PRACTITIONER

## 2022-12-02 PROCEDURE — 87086 URINE CULTURE/COLONY COUNT: CPT | Performed by: NURSE PRACTITIONER

## 2022-12-02 PROCEDURE — 87077 CULTURE AEROBIC IDENTIFY: CPT | Performed by: NURSE PRACTITIONER

## 2022-12-02 RX ORDER — NITROFURANTOIN 25; 75 MG/1; MG/1
100 CAPSULE ORAL 2 TIMES DAILY
Qty: 14 CAPSULE | Refills: 0 | Status: SHIPPED | OUTPATIENT
Start: 2022-12-02 | End: 2022-12-09

## 2022-12-05 ENCOUNTER — TELEPHONE (OUTPATIENT)
Dept: FAMILY MEDICINE CLINIC | Facility: CLINIC | Age: 62
End: 2022-12-05

## 2022-12-05 NOTE — TELEPHONE ENCOUNTER
Pt states she was at [facility] for a UTI. She got her results back this morning and it stated she has e-coli. Pt was told to verify she has the right medication with her PCP. She is taking    nitrofurantoin monohydrate macro 100 MG Oral Cap [id number] (Order [id number])    Pt wants you to be aware that she has allergies to medications so be careful if prescribing something different. Please advise. Thank you!       [address] [place] [address], [phone]   [place] [address]   Phone: [phone] Fax: [phone]

## 2022-12-07 RX ORDER — NITROFURANTOIN 25; 75 MG/1; MG/1
100 CAPSULE ORAL 2 TIMES DAILY
Qty: 6 CAPSULE | Refills: 0 | Status: SHIPPED | OUTPATIENT
Start: 2022-12-07 | End: 2022-12-10

## 2022-12-07 NOTE — TELEPHONE ENCOUNTER
Urine Cx sent on 12/2/2022 -reviewed - sensitive to [address] - so this antibiotic should clear the infection. Difficult to say what caused the urinary tract infection. Menopausal women are at slightly higher risk of this. Dehydration / sexual intercourse / any stomach issues - can all trigger this sort of infection.

## 2022-12-07 NOTE — TELEPHONE ENCOUNTER
Please extend the course to a total of 10 days. Send Rx macrobid 100 mg twice daily X 3 more days - total of 6 pills. No refills.

## 2022-12-07 NOTE — TELEPHONE ENCOUNTER
Advised patient of Doctor's note below. Patient verbalized understanding. Pt still c/o urgency  Symptoms overall improved  No fevers, no nausea    Pt asking if can extend abx course to 10 days?   Tomorrow will be day 7    Please advise, thank you    Pt agreeable to receive [facility] as response

## 2022-12-07 NOTE — TELEPHONE ENCOUNTER
Pt called to check status of response to message that was sent on Monday.  Please call back thank you

## 2022-12-08 ENCOUNTER — TELEPHONE (OUTPATIENT)
Dept: FAMILY MEDICINE CLINIC | Facility: CLINIC | Age: 62
End: 2022-12-08

## 2022-12-08 NOTE — TELEPHONE ENCOUNTER
Called and spoke with pt - she is requesting lab orders placed in September to be sent to [address] she will hold off on doing urine sample since currently being treated for UTI  Pt reports will wait couple weeks to complete urine test    Routing to PCP as [place]    CMP, A1C, microalb lab orders - faxed to Evan Faust at fax #[phone]

## 2022-12-08 NOTE — TELEPHONE ENCOUNTER
Pt would like her blood work sent over to [address] [place] [address] also is not sure if she needs the blood work that was ordered by Dr. Ernie Meyer anymore. If Dr. Aliza Brasher wants her to have them done also, please send orders to [facility] for those also  Thank you!

## 2022-12-20 DIAGNOSIS — I10 ESSENTIAL HYPERTENSION, BENIGN: ICD-10-CM

## 2022-12-20 DIAGNOSIS — E78.5 HYPERLIPIDEMIA ASSOCIATED WITH TYPE 2 DIABETES MELLITUS (HCC): ICD-10-CM

## 2022-12-20 DIAGNOSIS — E11.69 HYPERLIPIDEMIA ASSOCIATED WITH TYPE 2 DIABETES MELLITUS (HCC): ICD-10-CM

## 2022-12-20 RX ORDER — GLIPIZIDE 10 MG/1
TABLET, FILM COATED, EXTENDED RELEASE ORAL
Qty: 180 TABLET | Refills: 0 | Status: SHIPPED | OUTPATIENT
Start: 2022-12-20

## 2022-12-20 RX ORDER — METFORMIN HYDROCHLORIDE 750 MG/1
TABLET, EXTENDED RELEASE ORAL
Qty: 90 TABLET | Refills: 0 | Status: SHIPPED | OUTPATIENT
Start: 2022-12-20

## 2022-12-20 RX ORDER — OMEPRAZOLE 40 MG/1
CAPSULE, DELAYED RELEASE ORAL
Qty: 90 CAPSULE | Refills: 0 | Status: SHIPPED | OUTPATIENT
Start: 2022-12-20

## 2022-12-20 NOTE — TELEPHONE ENCOUNTER
Pt failed refill protocol for the following reasons:   Diabetic Medication Protocol Failed 12/20/2022 09:13 AM   Protocol Details  HgBA1C procedure resulted in past 6 months    Last HgBA1C < 7.5    Microalbumin procedure in past 12 months or taking ACE/ARB    Appointment in past 6 or next 3 months      Metformin:  Last refill: 9/12/2022 #90 with 0 refill    Glipizide:  Last refill: 9/12/2022 #180 with 0 refills      No refill for the following medication:  Omeprazole:  Last refill: 9/12/2022 #90 with 0 refills        Last appt: 5/31/2022  Next appt: No future appointments. Forward to Dr. Aaron Baires, please advise on refills. Thank you.

## 2022-12-21 ENCOUNTER — TELEPHONE (OUTPATIENT)
Dept: FAMILY MEDICINE CLINIC | Facility: CLINIC | Age: 62
End: 2022-12-21

## 2022-12-21 NOTE — TELEPHONE ENCOUNTER
PT CALLED AND ADV SHE THINKS SHE IS GETTING A UTI BACK. HAD UTI ON 12/2/22 - [facility]    PT ADV STARTED UP WITH SOME CRAMPING, URGENCY, SLOW IVELISSE URINE.    WONDERING IF SOMETHING ELSE CAN BE CALLED IN - DOES NOT WANT TO FEEL LIKE THIS      LOOKING FOR SOME PREVENTIVE  MEASURES As WELL.     [facility]     ** PT AWARE  IS OUT OF OFFICE UNTIL TOMORROW - OK TO WAIT TILL TOMORROW **    THANK YOU

## 2022-12-22 LAB
ALBUMIN/GLOBULIN RATIO: 2.5 (CALC) (ref 1–2.5)
ALBUMIN: 4.7 G/DL (ref 3.6–5.1)
ALKALINE PHOSPHATASE: 92 U/L (ref 37–153)
ALT: 27 U/L (ref 6–29)
AST: 20 U/L (ref 10–35)
BILIRUBIN, TOTAL: 0.6 MG/DL (ref 0.2–1.2)
BUN: 18 MG/DL (ref 7–25)
CALCIUM: 10.2 MG/DL (ref 8.6–10.4)
CARBON DIOXIDE: 26 MMOL/L (ref 20–32)
CHLORIDE: 101 MMOL/L (ref 98–110)
CREATININE: 0.75 MG/DL (ref 0.5–1.05)
EGFR: 89 ML/MIN/1.73M2
GLOBULIN: 1.9 G/DL (CALC) (ref 1.9–3.7)
GLUCOSE: 141 MG/DL (ref 65–99)
HEMOGLOBIN A1C: 8 % OF TOTAL HGB
POTASSIUM: 4.1 MMOL/L (ref 3.5–5.3)
PROTEIN, TOTAL: 6.6 G/DL (ref 6.1–8.1)
SODIUM: 139 MMOL/L (ref 135–146)

## 2022-12-22 RX ORDER — NITROFURANTOIN 25; 75 MG/1; MG/1
100 CAPSULE ORAL 2 TIMES DAILY
Qty: 10 CAPSULE | Refills: 0 | Status: SHIPPED | OUTPATIENT
Start: 2022-12-22 | End: 2022-12-27

## 2022-12-22 NOTE — TELEPHONE ENCOUNTER
Left message to voicemail (per verbal release form consent, confirmed with identifying message.)  Advised patient to call office back [phone] - need to triage.

## 2022-12-23 NOTE — TELEPHONE ENCOUNTER
Pravin Guan MD [place], [place] Nurse 16 hours ago (5:06 PM)     All this needs to be addressed as a visit. There's a lot to talk about. Did she give us another urine sample recently ? Last urinalysis completed 12/02/2022    Ok to schedule in office appt today?   Please advise, thank you

## 2022-12-23 NOTE — TELEPHONE ENCOUNTER
Advised pt that Rx sent - she v/u    Future Appointments   Date Time Provider Rolan Miles   1/4/2023  1:00 PM Pravin Marmolejo MD [facility] EMG [place]     Pt reviewed lab result notes - pt states that she saw Negro Peña when she was with Dr. Jessica Harris - stating \"Melita was useless, waste of time and money\"    Pt stating she knows what she has to do for diet    No further questions at this time

## 2022-12-24 DIAGNOSIS — E11.69 HYPERLIPIDEMIA ASSOCIATED WITH TYPE 2 DIABETES MELLITUS (HCC): ICD-10-CM

## 2022-12-24 DIAGNOSIS — E78.5 HYPERLIPIDEMIA ASSOCIATED WITH TYPE 2 DIABETES MELLITUS (HCC): ICD-10-CM

## 2022-12-24 DIAGNOSIS — I10 ESSENTIAL HYPERTENSION, BENIGN: ICD-10-CM

## 2022-12-27 RX ORDER — DAPAGLIFLOZIN 10 MG/1
TABLET, FILM COATED ORAL
Qty: 90 TABLET | Refills: 0 | Status: SHIPPED | OUTPATIENT
Start: 2022-12-27

## 2022-12-27 NOTE — TELEPHONE ENCOUNTER
Diabetic Medication Protocol Failed 12/27/2022 08:23 AM   Protocol Details  Last HgBA1C < 7.5    Microalbumin procedure in past 12 months or taking ACE/ARB    HgBA1C procedure resulted in past 6 months  12/21/22    Appointment in past 6 or next 3 months   5/31/22     Future Appointments   Date Time Provider Rolan Miles   1/4/2023  1:00 PM Pravin Carney MD [facility] [place]     Refilled until appt

## 2023-01-03 DIAGNOSIS — I10 ESSENTIAL HYPERTENSION, BENIGN: ICD-10-CM

## 2023-01-03 DIAGNOSIS — E11.69 HYPERLIPIDEMIA ASSOCIATED WITH TYPE 2 DIABETES MELLITUS (HCC): ICD-10-CM

## 2023-01-03 DIAGNOSIS — E78.5 HYPERLIPIDEMIA ASSOCIATED WITH TYPE 2 DIABETES MELLITUS (HCC): ICD-10-CM

## 2023-01-04 ENCOUNTER — OFFICE VISIT (OUTPATIENT)
Dept: FAMILY MEDICINE CLINIC | Facility: CLINIC | Age: 63
End: 2023-01-04
Payer: COMMERCIAL

## 2023-01-04 ENCOUNTER — TELEPHONE (OUTPATIENT)
Dept: FAMILY MEDICINE CLINIC | Facility: CLINIC | Age: 63
End: 2023-01-04

## 2023-01-04 VITALS
BODY MASS INDEX: 28.45 KG/M2 | WEIGHT: 177 LBS | SYSTOLIC BLOOD PRESSURE: 132 MMHG | OXYGEN SATURATION: 98 % | TEMPERATURE: 98 F | HEART RATE: 70 BPM | HEIGHT: 66 IN | DIASTOLIC BLOOD PRESSURE: 74 MMHG

## 2023-01-04 DIAGNOSIS — Z91.14 NON COMPLIANCE W MEDICATION REGIMEN: ICD-10-CM

## 2023-01-04 DIAGNOSIS — I10 ESSENTIAL HYPERTENSION, BENIGN: ICD-10-CM

## 2023-01-04 DIAGNOSIS — E11.22 TYPE 2 DIABETES MELLITUS WITH DIABETIC CHRONIC KIDNEY DISEASE (HCC): ICD-10-CM

## 2023-01-04 DIAGNOSIS — R19.7 DIARRHEA, UNSPECIFIED TYPE: ICD-10-CM

## 2023-01-04 DIAGNOSIS — Z76.0 ENCOUNTER FOR MEDICATION REFILL: ICD-10-CM

## 2023-01-04 DIAGNOSIS — E78.5 HYPERLIPIDEMIA ASSOCIATED WITH TYPE 2 DIABETES MELLITUS (HCC): ICD-10-CM

## 2023-01-04 DIAGNOSIS — L98.9 SKIN LESION: ICD-10-CM

## 2023-01-04 DIAGNOSIS — E11.65 TYPE 2 DIABETES MELLITUS WITH HYPERGLYCEMIA, WITHOUT LONG-TERM CURRENT USE OF INSULIN (HCC): Primary | ICD-10-CM

## 2023-01-04 DIAGNOSIS — E11.69 HYPERLIPIDEMIA ASSOCIATED WITH TYPE 2 DIABETES MELLITUS (HCC): ICD-10-CM

## 2023-01-04 LAB
CREAT UR-SCNC: 20 MG/DL
MICROALBUMIN UR-MCNC: 11.2 MG/DL
MICROALBUMIN/CREAT 24H UR-RTO: 560 UG/MG (ref ?–30)

## 2023-01-04 PROCEDURE — 3075F SYST BP GE 130 - 139MM HG: CPT | Performed by: FAMILY MEDICINE

## 2023-01-04 PROCEDURE — 3008F BODY MASS INDEX DOCD: CPT | Performed by: FAMILY MEDICINE

## 2023-01-04 PROCEDURE — 3078F DIAST BP <80 MM HG: CPT | Performed by: FAMILY MEDICINE

## 2023-01-04 PROCEDURE — 3060F POS MICROALBUMINURIA REV: CPT | Performed by: PHYSICIAN ASSISTANT

## 2023-01-04 PROCEDURE — 99214 OFFICE O/P EST MOD 30 MIN: CPT | Performed by: FAMILY MEDICINE

## 2023-01-04 PROCEDURE — 3061F NEG MICROALBUMINURIA REV: CPT | Performed by: PHYSICIAN ASSISTANT

## 2023-01-04 PROCEDURE — 82043 UR ALBUMIN QUANTITATIVE: CPT | Performed by: FAMILY MEDICINE

## 2023-01-04 PROCEDURE — 82570 ASSAY OF URINE CREATININE: CPT | Performed by: FAMILY MEDICINE

## 2023-01-04 RX ORDER — LOSARTAN POTASSIUM AND HYDROCHLOROTHIAZIDE 12.5; 5 MG/1; MG/1
TABLET ORAL
Qty: 90 TABLET | Refills: 1 | Status: SHIPPED | OUTPATIENT
Start: 2023-01-04

## 2023-01-04 RX ORDER — SEMAGLUTIDE 1.34 MG/ML
1 INJECTION, SOLUTION SUBCUTANEOUS
Qty: 9 ML | Refills: 1 | Status: SHIPPED | OUTPATIENT
Start: 2023-01-04 | End: 2023-06-21

## 2023-01-04 NOTE — TELEPHONE ENCOUNTER
Diabetic Medication Protocol Failed 01/04/2023 03:36 PM   Protocol Details  Last HgBA1C < 7.5    Microalbumin procedure in past 12 months or taking ACE/ARB    HgBA1C procedure resulted in past 6 months    Appointment in past 6 or next 3 months     LOV: today  Last Refill:9/27/22 3ml 3 RF    No future appointments.

## 2023-01-04 NOTE — TELEPHONE ENCOUNTER
Patient seen today    Wanted to relay info    Dm eye exam    [facility] in [place] (in mall)    She wanted to make sure the \"cream\" was called in to pharmacy as discussed

## 2023-01-04 NOTE — TELEPHONE ENCOUNTER
LOV 05/31/2022  Last labs 12/21/22  BP Readings from Last 3 Encounters:  12/02/22 : 153/77  09/30/22 : 136/76  06/16/22 : 133/69      Last refill on 07/07/2022, for #90 tabs, with 1 refills  LOSARTAN-HYDROCHLOROTHIAZIDE 50-12.5 MG Oral Tab     Hypertension Medications Protocol Passed 01/03/2023 11:50 AM   Protocol Details  CMP or BMP in past 12 months    Last serum creatinine< 2.0    Appointment in past 6 or next 3 months          Future Appointments   Date Time Provider Rolan Miles   1/4/2023  1:00 PM Pravin Gutierrez MD [facility] Unknown [place] per protocol

## 2023-01-06 ENCOUNTER — TELEPHONE (OUTPATIENT)
Dept: FAMILY MEDICINE CLINIC | Facility: CLINIC | Age: 63
End: 2023-01-06

## 2023-01-06 RX ORDER — DOXYCYCLINE HYCLATE 100 MG/1
100 CAPSULE ORAL 2 TIMES DAILY
Qty: 20 CAPSULE | Refills: 0 | Status: SHIPPED | OUTPATIENT
Start: 2023-01-06 | End: 2023-01-16

## 2023-01-06 NOTE — TELEPHONE ENCOUNTER
LOV 01/04/2023 with Dr. Bia Acosta    Pt reports felt like sinus infection starting at time of LOV    Has been doing furniture painting indoors - thinks this \"encouraged\" sinus infection    Pt c/o Headaches, behind eyes hurt, top of teeth hurts  Ears stuffy  Swollen in nose - nasal drainage  Tickle in throat, no cough  No fevers    Taking tylenol sinus and nasocort nasal spray   Also using Astepro - newer nasal spray    Requesting Rx    Please advise, thank you    (If sending Rx doxycycline - pt prefers capsules over tablets)

## 2023-01-06 NOTE — TELEPHONE ENCOUNTER
PT called she thinks that she has a sinus infection. She said she did mention it at her appointment. Want to know if the dr could call something in?       Would like sent to  [address] [phone], [phone]

## 2023-02-27 DIAGNOSIS — E11.69 HYPERLIPIDEMIA ASSOCIATED WITH TYPE 2 DIABETES MELLITUS (HCC): ICD-10-CM

## 2023-02-27 DIAGNOSIS — E78.5 HYPERLIPIDEMIA ASSOCIATED WITH TYPE 2 DIABETES MELLITUS (HCC): ICD-10-CM

## 2023-02-27 RX ORDER — ATORVASTATIN CALCIUM 40 MG/1
40 TABLET, FILM COATED ORAL NIGHTLY
Qty: 90 TABLET | Refills: 0 | Status: SHIPPED | OUTPATIENT
Start: 2023-02-27

## 2023-02-27 NOTE — TELEPHONE ENCOUNTER
Cholesterol Medication Protocol Passed 02/27/2023 12:37 AM   Protocol Details  ALT < 80    ALT resulted within past year    Lipid panel within past 12 months    Appointment within past 12 or next 3 months     Last OV 1/4/23  Last lab:  12/21/22 cmp/alt   4/7/22 lipid  Last refilled 8/31/22  #90  1 refill    Refilled per protocol

## 2023-03-05 ENCOUNTER — OFFICE VISIT (OUTPATIENT)
Dept: FAMILY MEDICINE CLINIC | Facility: CLINIC | Age: 63
End: 2023-03-05
Payer: COMMERCIAL

## 2023-03-05 VITALS
DIASTOLIC BLOOD PRESSURE: 76 MMHG | OXYGEN SATURATION: 98 % | BODY MASS INDEX: 28 KG/M2 | RESPIRATION RATE: 16 BRPM | HEART RATE: 100 BPM | WEIGHT: 174 LBS | TEMPERATURE: 98 F | SYSTOLIC BLOOD PRESSURE: 128 MMHG

## 2023-03-05 DIAGNOSIS — J01.90 ACUTE NON-RECURRENT SINUSITIS, UNSPECIFIED LOCATION: Primary | ICD-10-CM

## 2023-03-05 DIAGNOSIS — H10.9 CONJUNCTIVITIS OF BOTH EYES, UNSPECIFIED CONJUNCTIVITIS TYPE: ICD-10-CM

## 2023-03-05 PROCEDURE — 3078F DIAST BP <80 MM HG: CPT | Performed by: PHYSICIAN ASSISTANT

## 2023-03-05 PROCEDURE — 3074F SYST BP LT 130 MM HG: CPT | Performed by: PHYSICIAN ASSISTANT

## 2023-03-05 PROCEDURE — 99213 OFFICE O/P EST LOW 20 MIN: CPT | Performed by: PHYSICIAN ASSISTANT

## 2023-03-05 RX ORDER — TOBRAMYCIN 3 MG/ML
SOLUTION/ DROPS OPHTHALMIC
Qty: 5 ML | Refills: 0 | Status: SHIPPED | OUTPATIENT
Start: 2023-03-05

## 2023-03-05 RX ORDER — DOXYCYCLINE HYCLATE 100 MG/1
100 CAPSULE ORAL 2 TIMES DAILY
Qty: 20 CAPSULE | Refills: 0 | Status: SHIPPED | OUTPATIENT
Start: 2023-03-05

## 2023-03-06 LAB — SARS-COV-2 RNA RESP QL NAA+PROBE: NOT DETECTED

## 2023-03-10 ENCOUNTER — TELEPHONE (OUTPATIENT)
Dept: FAMILY MEDICINE CLINIC | Facility: CLINIC | Age: 63
End: 2023-03-10

## 2023-03-10 NOTE — TELEPHONE ENCOUNTER
[facility] [address] [place] [address] [place] [address] [phone], [phone]   [place] [address]   Phone: [phone] Fax: [phone]     PATIENT WAS AT [facility] ON MARCH 3, 2023. SHE STILL ISN'T FEELING BETTER. SHE IS ASKING IF DR GONZALES CAN PRESCRIBE STEROIDS FOR HER. ALSO, LOOKING FOR RECOMMENDATION ON HER EAR. IT FELLS STUFFED UP.

## 2023-03-10 NOTE — TELEPHONE ENCOUNTER
Patient's name and  verified     Patient is already using Nasacort. Patient stated Flonase burns the inside of her nose. .   Patient was wanting a Steroid.

## 2023-03-10 NOTE — TELEPHONE ENCOUNTER
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 Smart Comp not in office today, will have covering provider address - she v/u    Please advise, thank you

## 2023-03-10 NOTE — TELEPHONE ENCOUNTER
Should be evaluated in person to see if oral steroid is appropriate.  Generally not recommended standard of care with sinusitis

## 2023-03-10 NOTE — TELEPHONE ENCOUNTER
Advised patient of ADOLPH's note below. Patient verbalized understanding. Pt declining to schedule appt. No further questions at this time.

## 2023-03-15 DIAGNOSIS — J01.40 ACUTE PANSINUSITIS, RECURRENCE NOT SPECIFIED: ICD-10-CM

## 2023-03-15 RX ORDER — PREDNISONE 20 MG/1
40 TABLET ORAL DAILY
Qty: 10 TABLET | Refills: 0 | Status: SHIPPED | OUTPATIENT
Start: 2023-03-15 | End: 2023-03-20

## 2023-03-15 NOTE — TELEPHONE ENCOUNTER
PT CALLED AND ADV NEEDS A 3 MONTH SUPPLY PER INSURANCE OF :    semaglutide (OZEMPIC, 1 MG/DOSE,) 4 MG/3ML Subcutaneous Solution Pen-injector        ALSO PT ADV STILL NOT FEELING WELL - WENT TO [facility] ON 3/5 AND ADV STILL HAS SINUS ISSUES GOING ON.    REQUESTING SOME STEROIDS TO BE CALLED IN.       PLEASE ADV     [facility] BREANA     THANK YOU

## 2023-03-15 NOTE — TELEPHONE ENCOUNTER
Called [facility] pharmacy ph#[phone] - confirmed Rx Ozempic has refill available - 3 month supply - pharmacy will have Rx ready for pt today after 430PM      Reviewed [address] visit 03/05/23 - acute non-recurrent sinusitis, conjuctivits  Rx doxycycline and tobramycin eye drops    Called and spoke with pt - advised of note above re: Rx ozempic - pt v/u    Pt reports still with cough  Ears still stuffed  Pt states usually gets Rx steroid - but forgot to ask for it at [address] visit    Still has few days of abx left  Pt requesting Rx steroid    Last refill on 09/30/22, for #10 tabs, with 0 refills  predniSONE 20 MG Oral Tab    Order(s) pending, please review. Thank you.   [facility] [place]

## 2023-03-22 DIAGNOSIS — I10 ESSENTIAL HYPERTENSION, BENIGN: ICD-10-CM

## 2023-03-22 DIAGNOSIS — E11.69 HYPERLIPIDEMIA ASSOCIATED WITH TYPE 2 DIABETES MELLITUS (HCC): ICD-10-CM

## 2023-03-22 DIAGNOSIS — E78.5 HYPERLIPIDEMIA ASSOCIATED WITH TYPE 2 DIABETES MELLITUS (HCC): ICD-10-CM

## 2023-03-22 DIAGNOSIS — E11.65 TYPE 2 DIABETES MELLITUS WITH HYPERGLYCEMIA, WITHOUT LONG-TERM CURRENT USE OF INSULIN (HCC): Primary | ICD-10-CM

## 2023-03-22 NOTE — TELEPHONE ENCOUNTER
Diabetic Medication Protocol Failed 03/22/2023 04:35 PM   Protocol Details  Last HgBA1C < 7.5    HgBA1C procedure resulted in past 6 months    Microalbumin procedure in past 12 months or taking ACE/ARB    Appointment in past 6 or next 3 months        Last OV:01/04/2023, 90 caps, 0 refill   Last refill:omeprazole 12/20/2023, 90 daps, 0 refill                  Metformin 12/20/2022, 90 tabs, 0 refill                  Glipizide 12/02/2022, 180 tabs, 0 refill     Medication pended, please sign if appropriate

## 2023-03-23 RX ORDER — OMEPRAZOLE 40 MG/1
CAPSULE, DELAYED RELEASE ORAL
Qty: 90 CAPSULE | Refills: 0 | Status: SHIPPED | OUTPATIENT
Start: 2023-03-23

## 2023-03-23 RX ORDER — GLIPIZIDE 10 MG/1
TABLET, FILM COATED, EXTENDED RELEASE ORAL
Qty: 180 TABLET | Refills: 0 | Status: SHIPPED | OUTPATIENT
Start: 2023-03-23

## 2023-03-23 RX ORDER — METFORMIN HYDROCHLORIDE 750 MG/1
TABLET, EXTENDED RELEASE ORAL
Qty: 90 TABLET | Refills: 0 | Status: SHIPPED | OUTPATIENT
Start: 2023-03-23

## 2023-03-29 ENCOUNTER — OFFICE VISIT (OUTPATIENT)
Dept: FAMILY MEDICINE CLINIC | Facility: CLINIC | Age: 63
End: 2023-03-29
Payer: COMMERCIAL

## 2023-03-29 VITALS
HEART RATE: 98 BPM | SYSTOLIC BLOOD PRESSURE: 133 MMHG | BODY MASS INDEX: 28 KG/M2 | OXYGEN SATURATION: 98 % | DIASTOLIC BLOOD PRESSURE: 84 MMHG | WEIGHT: 174 LBS | TEMPERATURE: 97 F | RESPIRATION RATE: 16 BRPM

## 2023-03-29 DIAGNOSIS — J01.01 ACUTE RECURRENT MAXILLARY SINUSITIS: Primary | ICD-10-CM

## 2023-03-29 DIAGNOSIS — B34.9 VIRAL ILLNESS: ICD-10-CM

## 2023-03-29 LAB
OPERATOR ID: NORMAL
POCT LOT NUMBER: NORMAL
RAPID SARS-COV-2 BY PCR: NOT DETECTED

## 2023-03-29 PROCEDURE — 3075F SYST BP GE 130 - 139MM HG: CPT | Performed by: NURSE PRACTITIONER

## 2023-03-29 PROCEDURE — U0002 COVID-19 LAB TEST NON-CDC: HCPCS | Performed by: NURSE PRACTITIONER

## 2023-03-29 PROCEDURE — 99213 OFFICE O/P EST LOW 20 MIN: CPT | Performed by: NURSE PRACTITIONER

## 2023-03-29 PROCEDURE — 3079F DIAST BP 80-89 MM HG: CPT | Performed by: NURSE PRACTITIONER

## 2023-03-29 RX ORDER — PREDNISONE 20 MG/1
40 TABLET ORAL DAILY
Qty: 10 TABLET | Refills: 0 | Status: SHIPPED | OUTPATIENT
Start: 2023-03-29 | End: 2023-04-03

## 2023-03-29 RX ORDER — CEFDINIR 300 MG/1
300 CAPSULE ORAL 2 TIMES DAILY
Qty: 20 CAPSULE | Refills: 0 | Status: SHIPPED | OUTPATIENT
Start: 2023-03-29 | End: 2023-04-08

## 2023-05-26 DIAGNOSIS — E11.69 HYPERLIPIDEMIA ASSOCIATED WITH TYPE 2 DIABETES MELLITUS (HCC): ICD-10-CM

## 2023-05-26 DIAGNOSIS — E78.5 HYPERLIPIDEMIA ASSOCIATED WITH TYPE 2 DIABETES MELLITUS (HCC): ICD-10-CM

## 2023-05-26 RX ORDER — ATORVASTATIN CALCIUM 40 MG/1
TABLET, FILM COATED ORAL
Qty: 90 TABLET | Refills: 0 | Status: SHIPPED | OUTPATIENT
Start: 2023-05-26

## 2023-05-26 NOTE — TELEPHONE ENCOUNTER
Last OV:01/04/2023  Last refill:02/27/2023, 90 tabs  Last lipids 04/07/2022    No future appointments.     Medication pended, please sign if appropriate    Cholesterol Medication Protocol Failed 05/26/2023 07:34 AM   Protocol Details  Lipid panel within past 12 months    ALT < 80    ALT resulted within past year    Appointment within past 12 or next 3 months

## 2023-06-19 ENCOUNTER — TELEPHONE (OUTPATIENT)
Dept: FAMILY MEDICINE CLINIC | Facility: CLINIC | Age: 63
End: 2023-06-19

## 2023-06-19 DIAGNOSIS — E11.69 HYPERLIPIDEMIA ASSOCIATED WITH TYPE 2 DIABETES MELLITUS (HCC): Primary | ICD-10-CM

## 2023-06-19 DIAGNOSIS — E78.5 HYPERLIPIDEMIA ASSOCIATED WITH TYPE 2 DIABETES MELLITUS (HCC): Primary | ICD-10-CM

## 2023-06-19 DIAGNOSIS — Z13.29 SCREENING FOR THYROID DISORDER: ICD-10-CM

## 2023-06-19 DIAGNOSIS — E11.65 TYPE 2 DIABETES MELLITUS WITH HYPERGLYCEMIA, WITHOUT LONG-TERM CURRENT USE OF INSULIN (HCC): ICD-10-CM

## 2023-06-19 NOTE — TELEPHONE ENCOUNTER
Advised patient of notes below. Patient verbalized understanding. No further questions at this time.

## 2023-06-19 NOTE — TELEPHONE ENCOUNTER
Active order for:CMP, A1C, lipid panel   Labs ordered for [facility]    TSH Order(s) pending, please review. Thank you.

## 2023-06-19 NOTE — TELEPHONE ENCOUNTER
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 Asking to have [facility]. LOV: 1/4/23    [facility] ordered labs for pt in March, asking if NP can order them to [facility] with addition of Lipid panel.

## 2023-06-21 DIAGNOSIS — E11.69 HYPERLIPIDEMIA ASSOCIATED WITH TYPE 2 DIABETES MELLITUS (HCC): ICD-10-CM

## 2023-06-21 DIAGNOSIS — E78.5 HYPERLIPIDEMIA ASSOCIATED WITH TYPE 2 DIABETES MELLITUS (HCC): ICD-10-CM

## 2023-06-21 DIAGNOSIS — I10 ESSENTIAL HYPERTENSION, BENIGN: ICD-10-CM

## 2023-06-26 DIAGNOSIS — E11.22 TYPE 2 DIABETES MELLITUS WITH DIABETIC CHRONIC KIDNEY DISEASE (HCC): ICD-10-CM

## 2023-06-26 RX ORDER — METFORMIN HYDROCHLORIDE 750 MG/1
TABLET, EXTENDED RELEASE ORAL
Qty: 90 TABLET | Refills: 0 | Status: SHIPPED | OUTPATIENT
Start: 2023-06-26

## 2023-06-26 RX ORDER — SEMAGLUTIDE 1.34 MG/ML
1 INJECTION, SOLUTION SUBCUTANEOUS
Qty: 9 ML | Refills: 0 | Status: SHIPPED | OUTPATIENT
Start: 2023-06-26

## 2023-06-26 RX ORDER — GLIPIZIDE 10 MG/1
TABLET, FILM COATED, EXTENDED RELEASE ORAL
Qty: 180 TABLET | Refills: 0 | Status: SHIPPED | OUTPATIENT
Start: 2023-06-26

## 2023-06-26 RX ORDER — DAPAGLIFLOZIN 10 MG/1
TABLET, FILM COATED ORAL
Qty: 90 TABLET | Refills: 0 | Status: SHIPPED | OUTPATIENT
Start: 2023-06-26

## 2023-06-26 RX ORDER — OMEPRAZOLE 40 MG/1
CAPSULE, DELAYED RELEASE ORAL
Qty: 90 CAPSULE | Refills: 0 | Status: SHIPPED | OUTPATIENT
Start: 2023-06-26

## 2023-06-26 NOTE — TELEPHONE ENCOUNTER
Historical medication  Last refilled 1/4/23  LOV with MM 3/10/23  No future appt with pcp  Last A1C 6/19/23  Last Micro 1/4/23  Routed to CP to advise    Diabetic Medication Protocol [id number]/26/2023 10:00 AM   Protocol Details HgBA1C procedure resulted in past 6 months    Last HgBA1C < 7.5    Microalbumin procedure in past 12 months or taking ACE/ARB    Appointment in past 6 or next 3 months

## 2023-06-26 NOTE — ANESTHESIA PREPROCEDURE EVALUATION
PRE-OP EVALUATION    Patient Name: Filomena Foss    Pre-op Diagnosis: Tear of medial meniscus of left knee, current, subsequent encounter [A24.508K]    Procedure(s):  LEFT KNEE ARTHROSCOPY WITH PARTIAL MEDIAL MENISCECTOMY    Surgeon(s) and Role:     Tay Burton [FreeTextEntry1] : left [facility] RFA [FreeTextEntry3] : Procedural safety checklist and time out completed:\par Confirmed patient identification (Patient Name, , and/or medical record number including when possible affirmation by patient or parent/family/other).\par Confirmed procedure with the patient. Consent present, accurate and signed. \par Confirmed special equipment and supplies are present.\par Sterility confirmed. Position verified. \par Site/ side is marked and visible and confirmed. \par Procedure confirmed by consent. Accurate consent including side and site.\par Review of medical records, including venous ultrasound, noting correct procedure including site and side.\par MD/PA verifies presence and review of imaging studies and or written report of imaging studies.\par Agreement on the procedure to be performed\par Time out completed.\par All of the above has been confirmed by the team.\par All patient-specific concerns have been addressed. \par \par Indication: left lower extremity varicose veins with inflammation, leg pain, leg swelling, and leg cramping.  Venous insufficiency/ reflux.\par \par Procedure: radiofrequency ablation of the left small saphenous vein. \par 	\par Ms. MATILDE CHENG is a 74 year old F with a history of left lower extremity varicose veins previously seen in the office.  Ultrasound examination demonstrated venous insufficiency. A trial of compression stockings, exercise, elevation, and pain medication was attempted without relief and definitive treatment with radiofrequency ablation was offered. \par \par The patient has come for radiofrequency ablation treatment of the left small saphenous vein.\par I have discussed the risks of the procedure at length with the patient. The risks discussed were inclusive of but not limited to infection, irritation at the site of infiltration of local anesthesia, and also rare risk of deep venous thrombosis and pulmonary emboli. The patient agrees to proceed with the procedure. \par The patient was escorted into the procedure room and a time out called.\par The entire limb was prepped and draped in sterile fashion. The RF fiber was placed on the sterile field and connected by a sterile cable. Actuation, temperature, and impedance testing were performed to ensure that all components were connected and operating properly. \par The patient was placed on the procedure table and local anesthesia was instilled in the skin overlying the access site. Under ultrasound guidance, the vein was punctured with a micropuncture needle, using the anterior wall technique. A guide wire was now introduced through the needle, and the needle was then exchanged over the guide wire for a 7F sheath. The guide wire was removed and the RF probe was then placed into the left small saphenous vein through the sheath and position confirmed using ultrasound guidance. After the RF probe position was verified by ultrasound, tumescent anesthesia consisting of normal saline, 1% lidocaine with 8.4% sodium bicarbonate was infiltrated, under ultrasound guidance, precisely into the perivenous compartment along the entire length of the vein until a “halo” of fluid was noted around the vein. After RF probe position was again confirmed with ultrasound imaging, RF energy was applied. The probe was gradually and carefully withdrawn at a rate of 6.5cm/20seconds. \par \par 4 cycles of RF performed using the 3 cm probe\par Total treatment time was 80 seconds.\par The total volume injected was 150 cc\par Treatment length was 8 cm and \par The probe is 3.6 cm from the SPJ.\par \par Estimated Blood Loss: minimal\par Repeat ultrasound of the treated vein was performed confirming successful treatment. The catheter and sheath were withdrawn and hemostasis established with direct pressure. After assuring hemostasis, a sterile 4x4 was placed on the access site and an ACE compression wrap was applied. Patient tolerated procedure well. Patient was given post-procedure instructions and follow up appointment was scheduled.\par \par \par  mouth daily. Disp:  Rfl:    Saccharomyces boulardii (FLORASTOR OR) Take 1 tablet by mouth daily. Disp:  Rfl:        Allergies: Biaxin [Clarithromycin]; Penicillins; Strawberry C [Glucose];  Dog Dander [Dander]; Sulfa Antibiotics      Anesthesia Evaluation 07/09/2019    HCT 41.0 07/09/2019    MCV 84.0 07/09/2019    MCH 27.9 07/09/2019    MCHC 33.2 07/09/2019    RDW 13.6 07/09/2019    .0 07/09/2019     Lab Results   Component Value Date     07/09/2019    K 3.7 07/09/2019     07/09/2019    C

## 2023-06-29 ENCOUNTER — OFFICE VISIT (OUTPATIENT)
Dept: FAMILY MEDICINE CLINIC | Facility: CLINIC | Age: 63
End: 2023-06-29
Payer: COMMERCIAL

## 2023-06-29 VITALS
DIASTOLIC BLOOD PRESSURE: 70 MMHG | OXYGEN SATURATION: 98 % | HEIGHT: 65 IN | HEART RATE: 91 BPM | SYSTOLIC BLOOD PRESSURE: 144 MMHG | RESPIRATION RATE: 18 BRPM | BODY MASS INDEX: 28.99 KG/M2 | TEMPERATURE: 98 F | WEIGHT: 174 LBS

## 2023-06-29 DIAGNOSIS — R35.0 URINARY FREQUENCY: Primary | ICD-10-CM

## 2023-06-29 DIAGNOSIS — R81 GLUCOSURIA: ICD-10-CM

## 2023-06-29 LAB
APPEARANCE: CLEAR
BILIRUBIN: NEGATIVE
GLUCOSE (URINE DIPSTICK): >=1000 MG/DL
GLUCOSE BLOOD: 200
LEUKOCYTES: NEGATIVE
MULTISTIX LOT#: ABNORMAL NUMERIC
NITRITE, URINE: NEGATIVE
OCCULT BLOOD: NEGATIVE
PH, URINE: 5.5 (ref 4.5–8)
PROTEIN (URINE DIPSTICK): 30 MG/DL
SPECIFIC GRAVITY: 1.01 (ref 1–1.03)
TEST STRIP LOT #: ABNORMAL NUMERIC
URINE-COLOR: YELLOW
UROBILINOGEN,SEMI-QN: 0.2 MG/DL (ref 0–1.9)

## 2023-06-29 PROCEDURE — 87086 URINE CULTURE/COLONY COUNT: CPT | Performed by: NURSE PRACTITIONER

## 2023-06-29 PROCEDURE — 82962 GLUCOSE BLOOD TEST: CPT | Performed by: NURSE PRACTITIONER

## 2023-06-29 PROCEDURE — 81003 URINALYSIS AUTO W/O SCOPE: CPT | Performed by: NURSE PRACTITIONER

## 2023-06-29 PROCEDURE — 99213 OFFICE O/P EST LOW 20 MIN: CPT | Performed by: NURSE PRACTITIONER

## 2023-06-29 PROCEDURE — 3078F DIAST BP <80 MM HG: CPT | Performed by: NURSE PRACTITIONER

## 2023-06-29 PROCEDURE — 3052F HG A1C>EQUAL 8.0%<EQUAL 9.0%: CPT | Performed by: FAMILY MEDICINE

## 2023-06-29 PROCEDURE — 3008F BODY MASS INDEX DOCD: CPT | Performed by: NURSE PRACTITIONER

## 2023-06-29 PROCEDURE — 3077F SYST BP >= 140 MM HG: CPT | Performed by: NURSE PRACTITIONER

## 2023-06-30 ENCOUNTER — TELEPHONE (OUTPATIENT)
Dept: FAMILY MEDICINE CLINIC | Facility: CLINIC | Age: 63
End: 2023-06-30

## 2023-06-30 LAB
ALBUMIN/GLOBULIN RATIO: 2 (CALC) (ref 1–2.5)
ALBUMIN: 4.3 G/DL (ref 3.6–5.1)
ALKALINE PHOSPHATASE: 102 U/L (ref 37–153)
ALT: 26 U/L (ref 6–29)
AST: 19 U/L (ref 10–35)
BILIRUBIN, TOTAL: 0.4 MG/DL (ref 0.2–1.2)
BUN: 19 MG/DL (ref 7–25)
CALCIUM: 9.8 MG/DL (ref 8.6–10.4)
CARBON DIOXIDE: 25 MMOL/L (ref 20–32)
CHLORIDE: 102 MMOL/L (ref 98–110)
CHOL/HDLC RATIO: 3.5 (CALC)
CHOLESTEROL, TOTAL: 191 MG/DL
CREATININE: 0.78 MG/DL (ref 0.5–1.05)
EGFR: 85 ML/MIN/1.73M2
GLOBULIN: 2.2 G/DL (CALC) (ref 1.9–3.7)
GLUCOSE: 201 MG/DL (ref 65–99)
HDL CHOLESTEROL: 54 MG/DL
HEMOGLOBIN A1C: 8.4 % OF TOTAL HGB
LDL-CHOLESTEROL: 96 MG/DL (CALC)
NON-HDL CHOLESTEROL: 137 MG/DL (CALC)
POTASSIUM: 4 MMOL/L (ref 3.5–5.3)
PROTEIN, TOTAL: 6.5 G/DL (ref 6.1–8.1)
SODIUM: 138 MMOL/L (ref 135–146)
TRIGLYCERIDES: 286 MG/DL
TSH W/REFLEX TO FT4: 2.84 MIU/L (ref 0.4–4.5)

## 2023-07-12 DIAGNOSIS — E11.69 HYPERLIPIDEMIA ASSOCIATED WITH TYPE 2 DIABETES MELLITUS: ICD-10-CM

## 2023-07-12 DIAGNOSIS — I10 ESSENTIAL HYPERTENSION, BENIGN: ICD-10-CM

## 2023-07-12 DIAGNOSIS — E78.5 HYPERLIPIDEMIA ASSOCIATED WITH TYPE 2 DIABETES MELLITUS: ICD-10-CM

## 2023-07-12 RX ORDER — LOSARTAN POTASSIUM AND HYDROCHLOROTHIAZIDE 12.5; 5 MG/1; MG/1
1 TABLET ORAL DAILY
Qty: 90 TABLET | Refills: 1 | Status: CANCELLED | OUTPATIENT
Start: 2023-07-12

## 2023-07-12 NOTE — TELEPHONE ENCOUNTER
Pt has 1 tab left      LOV 01/04/23  Last labs 06/29/23  Last refill on 01/04/23, for #90 tabs, with 1 refills  LOSARTAN-HYDROCHLOROTHIAZIDE 50-12.5 MG Oral Tab   Hypertension Medications Protocol [id number]/12/2023 12:30 PM   Protocol Details Appointment in past 6 or next 3 months    CMP or BMP in past 12 months    Last serum creatinine< 2.0     No future appointments. Order(s) pending, please review. Thank you.

## 2023-07-13 DIAGNOSIS — E11.69 HYPERLIPIDEMIA ASSOCIATED WITH TYPE 2 DIABETES MELLITUS: ICD-10-CM

## 2023-07-13 DIAGNOSIS — I10 ESSENTIAL HYPERTENSION, BENIGN: ICD-10-CM

## 2023-07-13 DIAGNOSIS — E78.5 HYPERLIPIDEMIA ASSOCIATED WITH TYPE 2 DIABETES MELLITUS: ICD-10-CM

## 2023-07-13 RX ORDER — BUPROPION HYDROCHLORIDE 75 MG/1
75 TABLET ORAL 2 TIMES DAILY
Qty: 180 TABLET | Refills: 0 | Status: SHIPPED | OUTPATIENT
Start: 2023-07-13

## 2023-07-13 RX ORDER — LOSARTAN POTASSIUM AND HYDROCHLOROTHIAZIDE 12.5; 5 MG/1; MG/1
1 TABLET ORAL DAILY
Qty: 90 TABLET | Refills: 1 | Status: SHIPPED | OUTPATIENT
Start: 2023-07-13 | End: 2028-06-16

## 2023-07-13 NOTE — TELEPHONE ENCOUNTER
Pt was advised of the scripts sent to the pharmacy - verbalized understanding    Pt states she will contact pharmacy to make sure they are ready

## 2023-07-13 NOTE — TELEPHONE ENCOUNTER
LOV: 1/4/23  Last Refill:   Losartan 1/4/23 1/4/23  Buproprion  11/4/22  180 0 RF    No future appointments.   BP Readings from Last 2 Encounters:  06/29/23 : 144/70  03/29/23 : 133/84

## 2023-07-13 NOTE — TELEPHONE ENCOUNTER
Pt stating she is out of medication and needs medication called in ASAP. Please advise of status of      LOSARTAN-HYDROCHLOROTHIAZIDE 50-12.5 MG Oral Tab [[id number]] (Order [id number]     Pt also states she needs a refill of:    BUPROPION 75 MG Oral Tab [[id number]] (Order [id number]     [facility] [place] [phone], [address] [place] [address] [place] [address]   Phone: [phone] Fax: [phone]    Thank you!

## 2023-07-13 NOTE — TELEPHONE ENCOUNTER
PATIENT CALLING THIS MORNING INFORMING SHE NOW HAS NO LOSARTAN LEFT.  ASKING IF DR GONZALES CAN REFILL AT [facility] [place]

## 2023-08-08 ENCOUNTER — TELEPHONE (OUTPATIENT)
Dept: FAMILY MEDICINE CLINIC | Facility: CLINIC | Age: 63
End: 2023-08-08

## 2023-08-08 NOTE — TELEPHONE ENCOUNTER
This is not a vaccine we offer at this point. She may reach out to her pharmacy and speak with the pharmacist to gather more information if she would like.

## 2023-08-08 NOTE — TELEPHONE ENCOUNTER
Patient states she has seen advertisements for the RSV vaccine    Her grandkids had it last year and she is looking to prevent getting it from them if it goes around the schools again    She is wanting to know if the vaccine is appropriate for her? Is it one vaccine?     How long does it last?    Please adv  Thank you

## 2023-09-06 DIAGNOSIS — E11.22 TYPE 2 DIABETES MELLITUS WITH DIABETIC CHRONIC KIDNEY DISEASE (HCC): ICD-10-CM

## 2023-09-06 DIAGNOSIS — E78.5 HYPERLIPIDEMIA ASSOCIATED WITH TYPE 2 DIABETES MELLITUS: ICD-10-CM

## 2023-09-06 DIAGNOSIS — E11.69 HYPERLIPIDEMIA ASSOCIATED WITH TYPE 2 DIABETES MELLITUS: ICD-10-CM

## 2023-09-06 RX ORDER — SEMAGLUTIDE 1.34 MG/ML
1 INJECTION, SOLUTION SUBCUTANEOUS
Qty: 3 ML | Refills: 0 | Status: SHIPPED | OUTPATIENT
Start: 2023-09-06 | End: 2023-09-12

## 2023-09-06 RX ORDER — ATORVASTATIN CALCIUM 40 MG/1
TABLET, FILM COATED ORAL
Qty: 30 TABLET | Refills: 0 | Status: SHIPPED | OUTPATIENT
Start: 2023-09-06

## 2023-09-12 DIAGNOSIS — E11.22 TYPE 2 DIABETES MELLITUS WITH DIABETIC CHRONIC KIDNEY DISEASE (HCC): ICD-10-CM

## 2023-09-12 RX ORDER — SEMAGLUTIDE 1.34 MG/ML
1 INJECTION, SOLUTION SUBCUTANEOUS
Qty: 3 ML | Refills: 2 | Status: SHIPPED | OUTPATIENT
Start: 2023-09-12 | End: 2023-12-11

## 2023-09-19 DIAGNOSIS — E78.5 HYPERLIPIDEMIA ASSOCIATED WITH TYPE 2 DIABETES MELLITUS: ICD-10-CM

## 2023-09-19 DIAGNOSIS — I10 ESSENTIAL HYPERTENSION, BENIGN: ICD-10-CM

## 2023-09-19 DIAGNOSIS — E11.69 HYPERLIPIDEMIA ASSOCIATED WITH TYPE 2 DIABETES MELLITUS: ICD-10-CM

## 2023-09-19 RX ORDER — GLIPIZIDE 10 MG/1
10 TABLET, FILM COATED, EXTENDED RELEASE ORAL 2 TIMES DAILY
Qty: 60 TABLET | Refills: 0 | Status: SHIPPED | OUTPATIENT
Start: 2023-09-19 | End: 2023-10-19

## 2023-09-19 NOTE — TELEPHONE ENCOUNTER
Diabetic Medication Protocol Akjxgo3409/19/2023 01:20 PM   Protocol Details Last HgBA1C < 7.5    HgBA1C procedure resulted in past 6 months    Microalbumin procedure in past 12 months or taking ACE/ARB    Appointment in past 6 or next 3 months     LOV: 1/4/23  Last Refill: 6/26/23  180 0 RF    Future Appointments   Date Time Provider Rolan Miles   9/26/2023 11:40 AM Savanah Ravi MD [facility] [place]     Last A1c value was 8.4% done 6/29/2023.

## 2023-09-26 ENCOUNTER — OFFICE VISIT (OUTPATIENT)
Dept: FAMILY MEDICINE CLINIC | Facility: CLINIC | Age: 63
End: 2023-09-26
Payer: COMMERCIAL

## 2023-09-26 ENCOUNTER — TELEPHONE (OUTPATIENT)
Dept: FAMILY MEDICINE CLINIC | Facility: CLINIC | Age: 63
End: 2023-09-26

## 2023-09-26 VITALS
SYSTOLIC BLOOD PRESSURE: 136 MMHG | OXYGEN SATURATION: 98 % | RESPIRATION RATE: 12 BRPM | BODY MASS INDEX: 30 KG/M2 | DIASTOLIC BLOOD PRESSURE: 74 MMHG | TEMPERATURE: 97 F | WEIGHT: 181 LBS | HEART RATE: 93 BPM

## 2023-09-26 DIAGNOSIS — F40.243 ANXIETY WITH FLYING: ICD-10-CM

## 2023-09-26 DIAGNOSIS — E11.22 TYPE 2 DIABETES MELLITUS WITH STAGE 2 CHRONIC KIDNEY DISEASE, WITHOUT LONG-TERM CURRENT USE OF INSULIN: ICD-10-CM

## 2023-09-26 DIAGNOSIS — E66.09 CLASS 1 OBESITY DUE TO EXCESS CALORIES WITHOUT SERIOUS COMORBIDITY WITH BODY MASS INDEX (BMI) OF 30.0 TO 30.9 IN ADULT: ICD-10-CM

## 2023-09-26 DIAGNOSIS — R30.0 DYSURIA: ICD-10-CM

## 2023-09-26 DIAGNOSIS — E11.65 TYPE 2 DIABETES MELLITUS WITH HYPERGLYCEMIA, WITHOUT LONG-TERM CURRENT USE OF INSULIN (HCC): ICD-10-CM

## 2023-09-26 DIAGNOSIS — I10 ESSENTIAL HYPERTENSION, BENIGN: ICD-10-CM

## 2023-09-26 DIAGNOSIS — N18.2 TYPE 2 DIABETES MELLITUS WITH STAGE 2 CHRONIC KIDNEY DISEASE, WITHOUT LONG-TERM CURRENT USE OF INSULIN: ICD-10-CM

## 2023-09-26 DIAGNOSIS — Z79.899 ENCOUNTER FOR LONG-TERM (CURRENT) USE OF MEDICATIONS: Primary | ICD-10-CM

## 2023-09-26 DIAGNOSIS — E78.5 HYPERLIPIDEMIA ASSOCIATED WITH TYPE 2 DIABETES MELLITUS: ICD-10-CM

## 2023-09-26 DIAGNOSIS — R81 GLUCOSURIA: ICD-10-CM

## 2023-09-26 DIAGNOSIS — K21.9 GASTROESOPHAGEAL REFLUX DISEASE WITHOUT ESOPHAGITIS: ICD-10-CM

## 2023-09-26 DIAGNOSIS — E11.69 HYPERLIPIDEMIA ASSOCIATED WITH TYPE 2 DIABETES MELLITUS: ICD-10-CM

## 2023-09-26 PROBLEM — M65.332 TRIGGER MIDDLE FINGER OF LEFT HAND: Status: ACTIVE | Noted: 2023-09-20

## 2023-09-26 LAB
APPEARANCE: CLEAR
BILIRUBIN: NEGATIVE
GLUCOSE (URINE DIPSTICK): >=1000 MG/DL
KETONES (URINE DIPSTICK): NEGATIVE MG/DL
MULTISTIX LOT#: ABNORMAL NUMERIC
NITRITE, URINE: NEGATIVE
OCCULT BLOOD: NEGATIVE
PH, URINE: 6 (ref 4.5–8)
PROTEIN (URINE DIPSTICK): 100 MG/DL
SPECIFIC GRAVITY: 1.01 (ref 1–1.03)
URINE-COLOR: YELLOW
UROBILINOGEN,SEMI-QN: 0.2 MG/DL (ref 0–1.9)

## 2023-09-26 PROCEDURE — 3075F SYST BP GE 130 - 139MM HG: CPT | Performed by: FAMILY MEDICINE

## 2023-09-26 PROCEDURE — 3078F DIAST BP <80 MM HG: CPT | Performed by: FAMILY MEDICINE

## 2023-09-26 PROCEDURE — 81003 URINALYSIS AUTO W/O SCOPE: CPT | Performed by: FAMILY MEDICINE

## 2023-09-26 PROCEDURE — 87086 URINE CULTURE/COLONY COUNT: CPT | Performed by: FAMILY MEDICINE

## 2023-09-26 PROCEDURE — 99214 OFFICE O/P EST MOD 30 MIN: CPT | Performed by: FAMILY MEDICINE

## 2023-09-26 RX ORDER — ATORVASTATIN CALCIUM 40 MG/1
40 TABLET, FILM COATED ORAL NIGHTLY
Qty: 30 TABLET | Refills: 0 | Status: SHIPPED | OUTPATIENT
Start: 2023-09-26 | End: 2023-12-25

## 2023-09-26 RX ORDER — OMEPRAZOLE 40 MG/1
40 CAPSULE, DELAYED RELEASE ORAL DAILY
Qty: 90 CAPSULE | Refills: 0 | Status: SHIPPED | OUTPATIENT
Start: 2023-09-26

## 2023-09-26 RX ORDER — GLIPIZIDE 10 MG/1
10 TABLET, FILM COATED, EXTENDED RELEASE ORAL 2 TIMES DAILY
Qty: 180 TABLET | Refills: 0 | Status: SHIPPED | OUTPATIENT
Start: 2023-09-26 | End: 2023-12-25

## 2023-09-26 RX ORDER — METFORMIN HYDROCHLORIDE 750 MG/1
TABLET, EXTENDED RELEASE ORAL
Qty: 90 TABLET | Refills: 0 | Status: SHIPPED | OUTPATIENT
Start: 2023-09-26

## 2023-09-26 RX ORDER — LOSARTAN POTASSIUM AND HYDROCHLOROTHIAZIDE 12.5; 5 MG/1; MG/1
1 TABLET ORAL DAILY
Qty: 90 TABLET | Refills: 1 | Status: SHIPPED | OUTPATIENT
Start: 2023-09-26 | End: 2028-08-30

## 2023-09-26 RX ORDER — ALPRAZOLAM 1 MG/1
0.5 TABLET ORAL 2 TIMES DAILY PRN
Qty: 2 TABLET | Refills: 0 | Status: SHIPPED | OUTPATIENT
Start: 2023-09-26 | End: 2023-09-28

## 2023-09-26 NOTE — TELEPHONE ENCOUNTER
Received paperwork from [facility] pharmacy for refill Glipizide ER    Last OV: 09/26/2023 sick, 01/04/2023 OV  Last refill:09/26/2023, 180 tabs, 0 refill     Medication refused due to medication just refilled for 90 days

## 2023-09-27 ENCOUNTER — MED REC SCAN ONLY (OUTPATIENT)
Dept: FAMILY MEDICINE CLINIC | Facility: CLINIC | Age: 63
End: 2023-09-27

## 2023-09-27 ENCOUNTER — TELEPHONE (OUTPATIENT)
Dept: FAMILY MEDICINE CLINIC | Facility: CLINIC | Age: 63
End: 2023-09-27

## 2023-09-27 DIAGNOSIS — R30.0 DYSURIA: Primary | ICD-10-CM

## 2023-09-27 RX ORDER — NITROFURANTOIN 25; 75 MG/1; MG/1
100 CAPSULE ORAL 2 TIMES DAILY
Qty: 20 CAPSULE | Refills: 0 | Status: SHIPPED | OUTPATIENT
Start: 2023-09-27 | End: 2023-10-07

## 2023-09-27 NOTE — TELEPHONE ENCOUNTER
Received fax from pharmacy stating prior authorization is needed for omprazole 40 mg as insurance covers 90 days max per year.      Prior authorization started in epic, awaiting response

## 2023-09-27 NOTE — TELEPHONE ENCOUNTER
See intake message -- call placed to patient states urinary frequency is progressively getting worse and having some abdominal cramping similar to when she had bladder infection in the past. Has been drinking more fluids.     [facility]

## 2023-09-27 NOTE — TELEPHONE ENCOUNTER
Received fax from [facility] [place]  omeprazole approved from 9- to 9-. .    Dr Omid Ndiaye reviewed and patient and Abdullahi Morley at [facility]38 notified.     Copy to scan

## 2023-09-27 NOTE — TELEPHONE ENCOUNTER
Pt said symptoms are worsening from visit yesterday, she is wondering if Dr would send something in for her.

## 2023-09-30 PROBLEM — N39.0 URINARY TRACT INFECTION WITHOUT HEMATURIA, SITE UNSPECIFIED: Status: RESOLVED | Noted: 2022-04-14 | Resolved: 2023-09-30

## 2023-09-30 PROBLEM — R74.8 ELEVATED LIVER ENZYMES: Status: RESOLVED | Noted: 2022-04-14 | Resolved: 2023-09-30

## 2023-09-30 PROBLEM — R22.32 MASS OF HAND, LEFT: Status: RESOLVED | Noted: 2018-02-09 | Resolved: 2023-09-30

## 2023-09-30 PROBLEM — N39.0 URINARY TRACT INFECTION WITHOUT HEMATURIA: Status: RESOLVED | Noted: 2022-04-14 | Resolved: 2023-09-30

## 2023-09-30 PROBLEM — M65.332 TRIGGER MIDDLE FINGER OF LEFT HAND: Status: RESOLVED | Noted: 2023-09-20 | Resolved: 2023-09-30

## 2023-10-09 RX ORDER — BUPROPION HYDROCHLORIDE 75 MG/1
75 TABLET ORAL 2 TIMES DAILY
Qty: 180 TABLET | Refills: 0 | Status: SHIPPED | OUTPATIENT
Start: 2023-10-09

## 2023-10-09 NOTE — TELEPHONE ENCOUNTER
Last OV 09/26  Last refill 07/13 #180    Requested Prescriptions     Pending Prescriptions Disp Refills    BUPROPION 75 MG Oral Tab [Pharmacy Med Name: BUPROPION HCL 75 MG TABLET] 180 tablet 0     Sig: TAKE 1 TABLET BY MOUTH TWICE A DAY     No future appointments.

## 2023-10-16 ENCOUNTER — TELEPHONE (OUTPATIENT)
Dept: FAMILY MEDICINE CLINIC | Facility: CLINIC | Age: 63
End: 2023-10-16

## 2023-10-16 DIAGNOSIS — E11.65 TYPE 2 DIABETES MELLITUS WITH HYPERGLYCEMIA, WITHOUT LONG-TERM CURRENT USE OF INSULIN (HCC): Primary | ICD-10-CM

## 2023-10-16 DIAGNOSIS — E11.22 TYPE 2 DIABETES MELLITUS WITH DIABETIC CHRONIC KIDNEY DISEASE (HCC): ICD-10-CM

## 2023-10-16 NOTE — TELEPHONE ENCOUNTER
Previous advice has been to keep checking with pharmacies since changing the medication may require prior authorization and will delay future refills. Patient notified of above and is in agreement. Aware will forward to Dr Maged Bernardo for review upon his return.

## 2023-10-16 NOTE — TELEPHONE ENCOUNTER
Pt. Cannot get her ozempic because of back order issues. She is asking if Dr. Doris Franklin would like to put her on another kind of injectable?

## 2023-10-30 DIAGNOSIS — E11.69 HYPERLIPIDEMIA ASSOCIATED WITH TYPE 2 DIABETES MELLITUS: ICD-10-CM

## 2023-10-30 DIAGNOSIS — E78.5 HYPERLIPIDEMIA ASSOCIATED WITH TYPE 2 DIABETES MELLITUS: ICD-10-CM

## 2023-10-30 RX ORDER — ATORVASTATIN CALCIUM 40 MG/1
40 TABLET, FILM COATED ORAL NIGHTLY
Qty: 90 TABLET | Refills: 1 | Status: SHIPPED | OUTPATIENT
Start: 2023-10-30

## 2023-10-30 NOTE — TELEPHONE ENCOUNTER
Request for refill on atorvastatin    LOV  9-26-23    LAST LAB  6-30-23  Chem Profile  ALT  26                     6-30-23  Lipids    LAST RX 9-26-23  #30    Next OV  No future appointments.       PROTOCOL  Cholesterol Medication Protocol Nsaequ26/30/2023 04:58 PM   Protocol Details ALT < 80    ALT resulted within past year    Lipid panel within past 12 months    Appointment within past 12 or next 3 months       Refilled per protocol

## 2023-11-06 RX ORDER — SEMAGLUTIDE 1.34 MG/ML
1 INJECTION, SOLUTION SUBCUTANEOUS
Qty: 1 EACH | COMMUNITY
Start: 2023-11-06

## 2023-11-06 RX ORDER — TIRZEPATIDE 2.5 MG/.5ML
2.5 INJECTION, SOLUTION SUBCUTANEOUS WEEKLY
Qty: 2 ML | Refills: 1 | Status: SHIPPED | OUTPATIENT
Start: 2023-11-06 | End: 2023-11-06 | Stop reason: ALTCHOICE

## 2023-11-06 NOTE — TELEPHONE ENCOUNTER
Follow up call to patient was able to get ozempic so will continue with that. Mounjaro cancelled at pharmacy([facility]) and EMR updated to indicate patient on ozempic at this time.

## 2023-11-09 ENCOUNTER — OFFICE VISIT (OUTPATIENT)
Dept: FAMILY MEDICINE CLINIC | Facility: CLINIC | Age: 63
End: 2023-11-09
Payer: COMMERCIAL

## 2023-11-09 VITALS
WEIGHT: 181 LBS | RESPIRATION RATE: 18 BRPM | HEART RATE: 113 BPM | TEMPERATURE: 97 F | BODY MASS INDEX: 30 KG/M2 | DIASTOLIC BLOOD PRESSURE: 84 MMHG | OXYGEN SATURATION: 98 % | SYSTOLIC BLOOD PRESSURE: 140 MMHG

## 2023-11-09 DIAGNOSIS — J01.00 ACUTE NON-RECURRENT MAXILLARY SINUSITIS: Primary | ICD-10-CM

## 2023-11-09 PROCEDURE — 3079F DIAST BP 80-89 MM HG: CPT | Performed by: NURSE PRACTITIONER

## 2023-11-09 PROCEDURE — 99213 OFFICE O/P EST LOW 20 MIN: CPT | Performed by: NURSE PRACTITIONER

## 2023-11-09 PROCEDURE — 3077F SYST BP >= 140 MM HG: CPT | Performed by: NURSE PRACTITIONER

## 2023-11-09 RX ORDER — FLUCONAZOLE 150 MG/1
150 TABLET ORAL ONCE AS NEEDED
Qty: 1 TABLET | Refills: 0 | Status: SHIPPED | OUTPATIENT
Start: 2023-11-09 | End: 2023-11-09

## 2023-11-09 RX ORDER — DOXYCYCLINE HYCLATE 100 MG
100 TABLET ORAL 2 TIMES DAILY
Qty: 14 TABLET | Refills: 0 | Status: SHIPPED | OUTPATIENT
Start: 2023-11-09 | End: 2023-11-16

## 2023-11-09 NOTE — PATIENT INSTRUCTIONS
1. Rest. Drink plenty of fluids. 2. Supportive care as discussed. 3. Doxycycline as prescribed. Flonase as directed. 4. Follow up with PMD in 4-5 days for re-eval. Go to the emergency department immediately if symptoms worsen, change, you develop chest discomfort, wheezing, shortness of breath, or if you have any concerns. **Diflucan was prescribed as requested for in case you get a possible yeast infection secondary to antibiotic use. Please remember to hold your atorvastatin for 7 days after taking the diflucan. You can then resume your normal dose of atorvastatin.

## 2023-11-30 ENCOUNTER — TELEPHONE (OUTPATIENT)
Dept: FAMILY MEDICINE CLINIC | Facility: CLINIC | Age: 63
End: 2023-11-30

## 2023-12-13 NOTE — TELEPHONE ENCOUNTER
Last refilled 1/10/22 for #90 with 1 RF  LOV with MM 5/31/22  No future appt with pcp  Last CMP 4/16/22    Hypertension Medications Protocol Passed 07/07/2022 12:20 PM   Protocol Details  CMP or BMP in past 12 months    Last serum creatinine< 2.0    Appointment in past 6 or next 3 months [FreeTextEntry1] : This is a 35 year old female referred for evaluation of possible sleep apnea. The patient has multiple signs and symptoms of sleep-disordered breathing including snoring, witnessed apneas, daytime sleepiness. She was referred to the [facility] for a diagnostic HSAT. The ramifications of KIMBERLY and its potential therapeutic modalities were discussed with the patient. The patient was cautioned on the importance of avoiding drowsy driving. She will follow up with us after the HSAT.

## 2023-12-23 DIAGNOSIS — E78.5 HYPERLIPIDEMIA ASSOCIATED WITH TYPE 2 DIABETES MELLITUS  (HCC): ICD-10-CM

## 2023-12-23 DIAGNOSIS — E11.69 HYPERLIPIDEMIA ASSOCIATED WITH TYPE 2 DIABETES MELLITUS  (HCC): ICD-10-CM

## 2023-12-23 DIAGNOSIS — K21.9 GASTROESOPHAGEAL REFLUX DISEASE WITHOUT ESOPHAGITIS: ICD-10-CM

## 2023-12-23 DIAGNOSIS — E11.22 TYPE 2 DIABETES MELLITUS WITH STAGE 2 CHRONIC KIDNEY DISEASE, WITHOUT LONG-TERM CURRENT USE OF INSULIN  (HCC): ICD-10-CM

## 2023-12-23 DIAGNOSIS — N18.2 TYPE 2 DIABETES MELLITUS WITH STAGE 2 CHRONIC KIDNEY DISEASE, WITHOUT LONG-TERM CURRENT USE OF INSULIN  (HCC): ICD-10-CM

## 2023-12-23 DIAGNOSIS — E11.65 TYPE 2 DIABETES MELLITUS WITH HYPERGLYCEMIA, WITHOUT LONG-TERM CURRENT USE OF INSULIN (HCC): ICD-10-CM

## 2023-12-26 RX ORDER — OMEPRAZOLE 40 MG/1
40 CAPSULE, DELAYED RELEASE ORAL DAILY
Qty: 90 CAPSULE | Refills: 0 | Status: SHIPPED | OUTPATIENT
Start: 2023-12-26

## 2023-12-26 RX ORDER — METFORMIN HYDROCHLORIDE 750 MG/1
TABLET, EXTENDED RELEASE ORAL
Qty: 90 TABLET | Refills: 0 | Status: SHIPPED | OUTPATIENT
Start: 2023-12-26

## 2023-12-26 RX ORDER — GLIPIZIDE 10 MG/1
10 TABLET, FILM COATED, EXTENDED RELEASE ORAL 2 TIMES DAILY
Qty: 180 TABLET | Refills: 0 | Status: SHIPPED | OUTPATIENT
Start: 2023-12-26

## 2023-12-26 NOTE — TELEPHONE ENCOUNTER
OV 09/26/23  LABS 06/30/23    REFILLS  -OMEPRAZOLE 09/26 #90  -FARXIGA 09/26 #90  -METFORMIN 09/26 #90  -GLIPIZIDE 09/26 #180      No future appointments.

## 2024-01-02 DIAGNOSIS — E11.22 TYPE 2 DIABETES MELLITUS WITH DIABETIC CHRONIC KIDNEY DISEASE (HCC): ICD-10-CM

## 2024-01-02 DIAGNOSIS — I10 ESSENTIAL HYPERTENSION, BENIGN: ICD-10-CM

## 2024-01-03 ENCOUNTER — TELEPHONE (OUTPATIENT)
Dept: FAMILY MEDICINE CLINIC | Facility: CLINIC | Age: 64
End: 2024-01-03

## 2024-01-03 DIAGNOSIS — E11.22 TYPE 2 DIABETES MELLITUS WITH STAGE 2 CHRONIC KIDNEY DISEASE, WITHOUT LONG-TERM CURRENT USE OF INSULIN  (HCC): ICD-10-CM

## 2024-01-03 DIAGNOSIS — I10 ESSENTIAL HYPERTENSION, BENIGN: Primary | ICD-10-CM

## 2024-01-03 DIAGNOSIS — E78.5 HYPERLIPIDEMIA ASSOCIATED WITH TYPE 2 DIABETES MELLITUS  (HCC): ICD-10-CM

## 2024-01-03 DIAGNOSIS — E11.69 HYPERLIPIDEMIA ASSOCIATED WITH TYPE 2 DIABETES MELLITUS  (HCC): ICD-10-CM

## 2024-01-03 DIAGNOSIS — N18.2 TYPE 2 DIABETES MELLITUS WITH STAGE 2 CHRONIC KIDNEY DISEASE, WITHOUT LONG-TERM CURRENT USE OF INSULIN  (HCC): ICD-10-CM

## 2024-01-03 RX ORDER — LOSARTAN POTASSIUM AND HYDROCHLOROTHIAZIDE 12.5; 5 MG/1; MG/1
1 TABLET ORAL DAILY
Qty: 90 TABLET | Refills: 1 | Status: SHIPPED | OUTPATIENT
Start: 2024-01-03 | End: 2024-07-01

## 2024-01-03 RX ORDER — SEMAGLUTIDE 1.34 MG/ML
1 INJECTION, SOLUTION SUBCUTANEOUS
Qty: 1 EACH | Refills: 0 | Status: SHIPPED | OUTPATIENT
Start: 2024-01-03

## 2024-01-03 RX ORDER — BUPROPION HYDROCHLORIDE 75 MG/1
75 TABLET ORAL 2 TIMES DAILY
Qty: 180 TABLET | Refills: 1 | Status: SHIPPED | OUTPATIENT
Start: 2024-01-03

## 2024-01-03 NOTE — TELEPHONE ENCOUNTER
Patient outreach : left detail message on identified voicemail stating pt is due for labs , Hga1c ,cmp, annual physical ,         LOV:9/26/23  LAST LAB:6-29-23  LAST RX:  semaglutide (OZEMPIC, 1 MG/DOSE,) 4 MG/3ML Subcutaneous Solution Pen-injector 1 each -- 11/6/2023 --   Sig:   Inject 1 mg into the skin every 7 days.       losartan-hydroCHLOROthiazide 50-12.5 MG Oral Tab 90 tablet 1 9/26/2023 8/30/2028   Sig:   Take 1 tablet by mouth daily.          Next OV:No future appointments.   PROTOCOL  Hypertension Medications Protocol Ckgtpb3201/02/2024 11:06 PM   Protocol Details CMP or BMP in past 12 months    Last serum creatinine< 2.0    Appointment in past 6 or next 3 months     Diabetic Medication Protocol Lonqjl9501/02/2024 11:06 PM   Protocol Details HgBA1C procedure resulted in past 6 months    Last HgBA1C < 7.5    Microalbumin procedure in past 12 months or taking ACE/ARB    Appointment in past 6 or next 3 months

## 2024-01-03 NOTE — TELEPHONE ENCOUNTER
Pt failed refill protocol for the following reasons:   buPROPion 75 MG Oral Tab         Sig: Take 1 tablet (75 mg total) by mouth 2 (two) times daily.    Disp: 180 tablet    Refills: 0    Start: 1/2/2024    Class: Normal    Non-formulary    Last ordered: 2 months ago (10/9/2023) by Delgado Ravi MD       To be filled at: [place] [id number] [place] [address] -[phone], [phone]         Last refill: 10/9/23  Last appt: 1/4/23  Next appt: No future appointments.      Forward to Dr. Ruggiero, please advise on refills. Thank you.

## 2024-01-03 NOTE — TELEPHONE ENCOUNTER
PT. IS REQUESTING HER LAB ORDERS BE SENT TO [facility] IN [place]. SHE IS NOT FEELING GREAT NOW SO SHE IS NOT GOING TO GO AND GET THE LABS UNTIL SHE IS FEELING BETTER. FYI FOR DR. DIAZ.

## 2024-01-03 NOTE — TELEPHONE ENCOUNTER
See intake message and refill request encounter 1-2-24    Patient requesting blood work order faxed to [facility]    Last Comp Metabolic Panel, HgbA1c, lipid panel, TSH w/reflex to free T4 done   6-30-23, last microalbumin/creat ratio random urine done 1-4-23    Orders placed please review

## 2024-01-05 NOTE — TELEPHONE ENCOUNTER
Orders  for CMP, lipids, A1c and microalbumin faxed to "[facility]" at [phone].  Patient advised of fasting lab orders. Verbalizes understanding.

## 2024-01-16 ENCOUNTER — OFFICE VISIT (OUTPATIENT)
Dept: FAMILY MEDICINE CLINIC | Facility: CLINIC | Age: 64
End: 2024-01-16
Payer: COMMERCIAL

## 2024-01-16 VITALS
TEMPERATURE: 98 F | SYSTOLIC BLOOD PRESSURE: 132 MMHG | HEIGHT: 66 IN | BODY MASS INDEX: 28.61 KG/M2 | WEIGHT: 178 LBS | DIASTOLIC BLOOD PRESSURE: 72 MMHG | HEART RATE: 102 BPM | RESPIRATION RATE: 18 BRPM | OXYGEN SATURATION: 96 %

## 2024-01-16 DIAGNOSIS — J01.00 ACUTE NON-RECURRENT MAXILLARY SINUSITIS: Primary | ICD-10-CM

## 2024-01-16 PROCEDURE — 3078F DIAST BP <80 MM HG: CPT | Performed by: PHYSICIAN ASSISTANT

## 2024-01-16 PROCEDURE — 3008F BODY MASS INDEX DOCD: CPT | Performed by: PHYSICIAN ASSISTANT

## 2024-01-16 PROCEDURE — 99213 OFFICE O/P EST LOW 20 MIN: CPT | Performed by: PHYSICIAN ASSISTANT

## 2024-01-16 PROCEDURE — 3075F SYST BP GE 130 - 139MM HG: CPT | Performed by: PHYSICIAN ASSISTANT

## 2024-01-16 RX ORDER — METHYLPREDNISOLONE 4 MG/1
TABLET ORAL
Qty: 1 EACH | Refills: 0 | Status: SHIPPED | OUTPATIENT
Start: 2024-01-16

## 2024-01-16 RX ORDER — CEFDINIR 300 MG/1
300 CAPSULE ORAL 2 TIMES DAILY
Qty: 20 CAPSULE | Refills: 0 | Status: SHIPPED | OUTPATIENT
Start: 2024-01-16

## 2024-01-16 RX ORDER — FLUCONAZOLE 150 MG/1
150 TABLET ORAL ONCE
Qty: 1 TABLET | Refills: 0 | Status: SHIPPED | OUTPATIENT
Start: 2024-01-16 | End: 2024-01-16

## 2024-01-16 NOTE — PROGRESS NOTES
CHIEF COMPLAINT:     Chief Complaint   Patient presents with    Nasal Congestion       HPI:   Annmarie Goldman is a 63 year old female who presents with 3 weeks of nasal congestion and mid face sinus pressure.  Intermittently her upper teeth hurt a bit.  Ears feel full particularly the right ear.  Started with typical cold symptom between joe and new years.  She feel like she has a sinus infection.  Prefers not  to use doxycycline if needs antibiotics.      Associated symptoms:    Fever/Chills  No  Sore throat  Yes in the mornings only  Cough  Yes but it minimal   Congestion Yes mostly in the nose and face.  Bodyache  No  Headache  Yes  Chest pain No  SOB/Dyspnea No  Diarrhea No  Vomiting No    Covid vaccination UTD.  Recvd RSV vaccination this season  Had flu shot this season        Current Outpatient Medications   Medication Sig Dispense Refill    cefdinir 300 MG Oral Cap Take 1 capsule (300 mg total) by mouth 2 (two) times daily. 20 capsule 0    methylPREDNISolone (MEDROL) 4 MG Oral Tablet Therapy Pack As directed. 1 each 0    fluconazole (DIFLUCAN) 150 MG Oral Tab Take 1 tablet (150 mg total) by mouth once for 1 dose. 1 tablet 0    losartan-hydroCHLOROthiazide 50-12.5 MG Oral Tab Take 1 tablet by mouth daily. 90 tablet 1    semaglutide (OZEMPIC, 1 MG/DOSE,) 4 MG/3ML Subcutaneous Solution Pen-injector Inject 1 mg into the skin every 7 days. 1 each 0    buPROPion 75 MG Oral Tab Take 1 tablet (75 mg total) by mouth 2 (two) times daily. 180 tablet 1    glipiZIDE ER 10 MG Oral Tablet 24 Hr Take 1 tablet (10 mg total) by mouth 2 (two) times daily. 180 tablet 0    metFORMIN  MG Oral Tablet 24 Hr TAKE 1 TABLET BY MOUTH EVERY DAY 90 tablet 0    dapagliflozin (FARXIGA) 10 MG Oral Tab Take 1 tablet (10 mg total) by mouth daily. 90 tablet 0    Omeprazole 40 MG Oral Capsule Delayed Release Take 1 capsule (40 mg total) by mouth daily. 90 capsule 0    atorvastatin 40 MG Oral Tab Take 1 tablet (40 mg total) by  mouth nightly. 90 tablet 1    levocetirizine 5 MG Oral Tab Take 1 tablet (5 mg total) by mouth every evening.      Saccharomyces boulardii (FLORASTOR OR) Take 1 tablet by mouth daily.          Past Medical History:   Diagnosis Date    Allergic rhinitis due to pollen 05/07/2012    Diverticulosis of colon (without mention of hemorrhage) 12/15/2008    Esophageal reflux     Essential hypertension, benign 11/05/2007    Family history of breast cancer 01/06/2014    Family history of breast cancer in mother 01/06/2014    High blood pressure     High cholesterol     Obesity, unspecified 11/05/2007    PONV (postoperative nausea and vomiting)     Proteinuria 03/11/2011    Type II or unspecified type diabetes mellitus without mention of complication, not stated as uncontrolled 05/09/2011      Social History:  Social History     Socioeconomic History    Marital status:     Number of children: 2   Occupational History    Occupation:    Tobacco Use    Smoking status: Never    Smokeless tobacco: Never   Vaping Use    Vaping Use: Never used   Substance and Sexual Activity    Alcohol use: No     Alcohol/week: 0.0 standard drinks of alcohol    Drug use: Not Currently     Types: Other-see comments     Comment: CBD gummies at night for arthritis    Sexual activity: Yes     Partners: Male     Birth control/protection: Hysterectomy     Comment: ovaries intact        Review of Systems:    Positive for stated complaint: nasal and  sinus congestion.   Pertinent positives and negatives noted in the the HPI.    EXAM:   /72   Pulse 102   Temp 97.5 °F (36.4 °C) (Oral)   Resp 18   Ht 5' 6\" (1.676 m)   Wt 178 lb (80.7 kg)   SpO2 96%   BMI 28.73 kg/m²   GENERAL: well developed, well nourished,in no apparent distress  SKIN: no rashes,no suspicious lesions  HEAD: atraumatic, normocephalic  EYES: conjunctiva clear, sclera white,  PERRLA  EARS: TM's non erythematous  NOSE: nares patent, mucosa moderate congestion.  +  tenderness over maxillary sinuses  THROAT: Posterior pharynx is non erythematous  NECK: supple, non-tender  LUNGS: clear to auscultation bilaterally without rale, ronchi, wheeze.  CARDIO: S1/S2 without murmur  GI: BS's present x4. No palpable masses or organomegaly.  no tenderness on palpation.  EXTREMITIES: no cyanosis, clubbing or edema  LYMPH:  no gross lymphadenopathy.      No results found for this or any previous visit (from the past 24 hour(s)).      ASSESSMENT AND PLAN:   Annmarie Goldman is a 63 year old female who presents with Nasal Congestion. Symptoms are consistent with:      ASSESSMENT:  Encounter Diagnosis   Name Primary?    Acute non-recurrent maxillary sinusitis Yes         PLAN:  Symptomatic care:   1. Rest. Drink plenty of fluids.  2. Tylenol or ibuprofen for discomfort or fever.   3. OTC decongestant (phenylephrine) expectorants (guaifenesin), nasal steroid sprays such as flonase cause burning so she is currently avoiding them.  4. OTC cough suppressant for cough  (dextromethorphan)  5. Chloraseptic spray/throat lozenges for sore throat   6 cefdinir as written.  She has taken it before without problems.  7 medrol dose pack.  Monitor sugars.  8 nasal saline    I  provided a written script for diflucan if she develop vaginal itching which  has  oftern occurred in  the past with antibiotics.       Go to the ED for evaluation with progressive symptoms of difficulty swallowing, breathing, shortness of breath, chest pain, extreme weakness, or confusion.         Meds & Refills for this Visit:  Requested Prescriptions     Signed Prescriptions Disp Refills    cefdinir 300 MG Oral Cap 20 capsule 0     Sig: Take 1 capsule (300 mg total) by mouth 2 (two) times daily.    methylPREDNISolone (MEDROL) 4 MG Oral Tablet Therapy Pack 1 each 0     Sig: As directed.    fluconazole (DIFLUCAN) 150 MG Oral Tab 1 tablet 0     Sig: Take 1 tablet (150 mg total) by mouth once for 1 dose.       Risks, benefits, side  effects of medication addressed and explained.    There are no Patient Instructions on file for this visit.    The patient indicates understanding of these issues and agrees to the plan.  The patient is asked to follow up PCP

## 2024-01-19 DIAGNOSIS — E11.22 TYPE 2 DIABETES MELLITUS WITH DIABETIC CHRONIC KIDNEY DISEASE (HCC): ICD-10-CM

## 2024-01-20 RX ORDER — SEMAGLUTIDE 1.34 MG/ML
1 INJECTION, SOLUTION SUBCUTANEOUS
Qty: 2 EACH | Refills: 0 | Status: SHIPPED | OUTPATIENT
Start: 2024-01-20

## 2024-01-20 NOTE — TELEPHONE ENCOUNTER
Diabetic Medication Protocol [id number]/19/2024 06:42 PM   Protocol Details HgBA1C procedure resulted in past 6 months    Last HgBA1C < 7.5    Microalbumin procedure in past 12 months or taking ACE/ARB    Appointment in past 6 or next 3 months   Last visit 6/26/23  No future appointments.  A1c 6/30/23 8.4  Micro  1/4/2023 11.20

## 2024-01-23 ENCOUNTER — TELEPHONE (OUTPATIENT)
Dept: FAMILY MEDICINE CLINIC | Facility: CLINIC | Age: 64
End: 2024-01-23

## 2024-01-23 NOTE — TELEPHONE ENCOUNTER
SHE CALLED BACK TO LET YOU KNOW THAT SHE HAS BEEN SICK AND IS STILL ON MEDICATION SO SHE WILL SCHEDULE HER LAB APPOINTMENT WHEN SHE FINISHES THE ANTIBIOTIC.

## 2024-01-24 ENCOUNTER — TELEPHONE (OUTPATIENT)
Dept: FAMILY MEDICINE CLINIC | Facility: CLINIC | Age: 64
End: 2024-01-24

## 2024-01-24 DIAGNOSIS — E11.22 TYPE 2 DIABETES MELLITUS WITH DIABETIC CHRONIC KIDNEY DISEASE (HCC): ICD-10-CM

## 2024-01-24 RX ORDER — SEMAGLUTIDE 1.34 MG/ML
1 INJECTION, SOLUTION SUBCUTANEOUS
Qty: 3 EACH | Refills: 0 | Status: SHIPPED | OUTPATIENT
Start: 2024-01-24

## 2024-01-24 NOTE — TELEPHONE ENCOUNTER
Received fax from [facility] pharmacy requesting quantity on ozempic be changed to #3  due to they cannot combine boxes.    Prescription updated and placed. See telephone encounter 1-20-24.

## 2024-03-03 ENCOUNTER — OFFICE VISIT (OUTPATIENT)
Dept: FAMILY MEDICINE CLINIC | Facility: CLINIC | Age: 64
End: 2024-03-03
Payer: COMMERCIAL

## 2024-03-03 VITALS
TEMPERATURE: 98 F | HEART RATE: 106 BPM | WEIGHT: 175 LBS | SYSTOLIC BLOOD PRESSURE: 144 MMHG | RESPIRATION RATE: 16 BRPM | OXYGEN SATURATION: 96 % | BODY MASS INDEX: 28.13 KG/M2 | DIASTOLIC BLOOD PRESSURE: 88 MMHG | HEIGHT: 66 IN

## 2024-03-03 DIAGNOSIS — R20.8 NASAL BURNING: Primary | ICD-10-CM

## 2024-03-03 DIAGNOSIS — R09.82 POST-NASAL DRAINAGE: ICD-10-CM

## 2024-03-03 PROCEDURE — 99213 OFFICE O/P EST LOW 20 MIN: CPT | Performed by: FAMILY MEDICINE

## 2024-03-03 NOTE — PROGRESS NOTES
Annmarie Goldman is a 63 year old female.    S:  Patient presents today with the following concerns:  Chief Complaint   Patient presents with    Cold     Day 4, post nasal drip, nose burning, coughing  Daugher having surgery next week wanted to be clear be around her  OTC Nyquill and Dayquill    Nose is burning.  Throat is burning.  Started after having car cleaned out.  No fevers, body aches.  No N/V/D.  No dyspnea.  Wanted to be checked out because daughter having hysterectomy next week.   Dayquil and Nyquil raise her blood pressure.    Current Outpatient Medications   Medication Sig Dispense Refill    semaglutide (OZEMPIC, 1 MG/DOSE,) 4 MG/3ML Subcutaneous Solution Pen-injector Inject 1 mg into the skin every 7 days. 3 each 0    cefdinir 300 MG Oral Cap Take 1 capsule (300 mg total) by mouth 2 (two) times daily. 20 capsule 0    methylPREDNISolone (MEDROL) 4 MG Oral Tablet Therapy Pack As directed. 1 each 0    losartan-hydroCHLOROthiazide 50-12.5 MG Oral Tab Take 1 tablet by mouth daily. 90 tablet 1    buPROPion 75 MG Oral Tab Take 1 tablet (75 mg total) by mouth 2 (two) times daily. 180 tablet 1    glipiZIDE ER 10 MG Oral Tablet 24 Hr Take 1 tablet (10 mg total) by mouth 2 (two) times daily. 180 tablet 0    metFORMIN  MG Oral Tablet 24 Hr TAKE 1 TABLET BY MOUTH EVERY DAY 90 tablet 0    dapagliflozin (FARXIGA) 10 MG Oral Tab Take 1 tablet (10 mg total) by mouth daily. 90 tablet 0    Omeprazole 40 MG Oral Capsule Delayed Release Take 1 capsule (40 mg total) by mouth daily. 90 capsule 0    atorvastatin 40 MG Oral Tab Take 1 tablet (40 mg total) by mouth nightly. 90 tablet 1    levocetirizine 5 MG Oral Tab Take 1 tablet (5 mg total) by mouth every evening.      Saccharomyces boulardii (FLORASTOR OR) Take 1 tablet by mouth daily.         Patient Active Problem List   Diagnosis    Essential hypertension, benign    Obesity, unspecified    Diverticulosis of colon (without mention of hemorrhage)    S/P partial  resection of colon    Hyperlipidemia associated with type 2 diabetes mellitus (HCC)    GERD (gastroesophageal reflux disease)    Primary osteoarthritis of left knee    Type 2 diabetes mellitus with hyperglycemia, without long-term current use of insulin (HCC)     Family History   Problem Relation Age of Onset    High Blood Pressure Father     Diabetes Father     High Blood Pressure Mother     Eye Problems Mother     Breast Cancer Mother 70        dx age 70    Breast Cancer Maternal Grandmother 70        dx age 70s       REVIEW OF SYSTEMS:  GENERAL: feels well otherwise  SKIN: denies any unusual skin lesions  EYES:denies vision change  LUNGS: denies shortness of breath with exertion  CARDIOVASCULAR: denies chest pain on exertion  GI: denies abdominal pain.  No N/V/D/C  : denies dysuria  MUSCULOSKELETAL: denies back pain  NEURO: denies headaches    EXAM:  /88   Pulse 106   Temp 98.3 °F (36.8 °C)   Resp 16   Ht 5' 6\" (1.676 m)   Wt 175 lb (79.4 kg)   SpO2 96%   BMI 28.25 kg/m²   Physical Exam  Constitutional:       General: She is not in acute distress.     Appearance: Normal appearance. She is normal weight. She is not ill-appearing.   HENT:      Head: Normocephalic and atraumatic.      Right Ear: Tympanic membrane, ear canal and external ear normal.      Left Ear: Tympanic membrane, ear canal and external ear normal.      Nose: Congestion present.      Right Turbinates: Pale.      Left Turbinates: Pale.      Mouth/Throat:      Mouth: Mucous membranes are moist.      Pharynx: Oropharynx is clear.      Comments: Pharynx with cobblestoning  Eyes:      Extraocular Movements: Extraocular movements intact.      Conjunctiva/sclera: Conjunctivae normal.      Pupils: Pupils are equal, round, and reactive to light.   Cardiovascular:      Rate and Rhythm: Normal rate and regular rhythm.      Heart sounds: Normal heart sounds.   Pulmonary:      Effort: Pulmonary effort is normal. No respiratory distress.       Breath sounds: Normal breath sounds.   Musculoskeletal:      Cervical back: Neck supple.   Lymphadenopathy:      Cervical: No cervical adenopathy.   Skin:     General: Skin is warm and dry.   Neurological:      General: No focal deficit present.      Mental Status: She is alert and oriented to person, place, and time.   Psychiatric:         Mood and Affect: Mood normal.         Behavior: Behavior normal.        ASSESSMENT AND PLAN:  Annmarie Goldman is a 63 year old female.  Encounter Diagnoses   Name Primary?    Nasal burning Yes    Post-nasal drainage        No results found.     No orders of the defined types were placed in this encounter.    Meds & Refills for this Visit:  Requested Prescriptions      No prescriptions requested or ordered in this encounter     Imaging & Consults:  None    Likely allergies or viral URI.  Recommend continued antihistamine.  Start back on steroid nasal spray and administer opposite hand to nostril.  Fluids, steam, rest.  Offered alinity quad or rapid covid testing and patient declines.    She will follow up if symptoms change, worsen, do not improve.    Patient verbalizes understanding of plan.    No follow-ups on file.

## 2024-03-06 ENCOUNTER — TELEPHONE (OUTPATIENT)
Dept: FAMILY MEDICINE CLINIC | Facility: CLINIC | Age: 64
End: 2024-03-06

## 2024-03-06 NOTE — TELEPHONE ENCOUNTER
Per patient symptoms are not improving continues to have head congestion and now with Left eye redness, agrees to come in for evaluation    Future Appointments   Date Time Provider Department Center   3/7/2024 11:00 AM Julieth Russ APRN [facility] [place]

## 2024-03-06 NOTE — TELEPHONE ENCOUNTER
Pt was in the walk in care in [place] on Sunday for a sick visit, she said now she has a red eye and is wanting to know if Dr can prescribe something for it.

## 2024-03-07 ENCOUNTER — OFFICE VISIT (OUTPATIENT)
Dept: FAMILY MEDICINE CLINIC | Facility: CLINIC | Age: 64
End: 2024-03-07
Payer: COMMERCIAL

## 2024-03-07 VITALS
SYSTOLIC BLOOD PRESSURE: 120 MMHG | DIASTOLIC BLOOD PRESSURE: 80 MMHG | BODY MASS INDEX: 29.09 KG/M2 | HEART RATE: 102 BPM | WEIGHT: 181 LBS | OXYGEN SATURATION: 96 % | HEIGHT: 66 IN | TEMPERATURE: 99 F

## 2024-03-07 DIAGNOSIS — R05.1 ACUTE COUGH: ICD-10-CM

## 2024-03-07 DIAGNOSIS — H10.32 ACUTE BACTERIAL CONJUNCTIVITIS OF LEFT EYE: Primary | ICD-10-CM

## 2024-03-07 PROCEDURE — 99213 OFFICE O/P EST LOW 20 MIN: CPT

## 2024-03-07 RX ORDER — PREDNISONE 20 MG/1
20 TABLET ORAL DAILY
Qty: 5 TABLET | Refills: 0 | Status: SHIPPED | OUTPATIENT
Start: 2024-03-07 | End: 2024-03-12

## 2024-03-07 RX ORDER — POLYMYXIN B SULFATE AND TRIMETHOPRIM 1; 10000 MG/ML; [USP'U]/ML
1 SOLUTION OPHTHALMIC EVERY 6 HOURS
Qty: 10 ML | Refills: 0 | Status: SHIPPED | OUTPATIENT
Start: 2024-03-07 | End: 2024-03-12

## 2024-03-07 RX ORDER — FLUCONAZOLE 150 MG/1
150 TABLET ORAL ONCE
Qty: 1 TABLET | Refills: 0 | Status: CANCELLED | OUTPATIENT
Start: 2024-03-07 | End: 2024-03-07

## 2024-03-07 RX ORDER — CLOBETASOL PROPIONATE 0.5 MG/G
OINTMENT TOPICAL
COMMUNITY
Start: 2024-02-09

## 2024-03-07 NOTE — PROGRESS NOTES
Annmarie Goldman is a 63 year old female.  HPI:     Patient in office for left eye redness that started yesterday and head congestion that started 10 days ago.  She said it feels like its a head cold. She has been using flonaise, nightquil, dayquil and tylenol sinus with some relief.  She reports she never felt sick but feels tired and feels like this could be bronchitis with the cough worse at night when she takes a deep breath.  Denies fever but is concerned her temp is 98.8 and was 98.3 at walk in [facility] 4 days ago.  She reports she watches her grandchildren and her daughter is having a hysterectomy next week and she needs to find out what this is.  Offered viral swab to test for influenza A, Influenza B, RSV and COVID-19.  Patient declines and said she does not want to know.  She was seen at walk in [facility] 4 days ago for cough and post nasal drip.  Recommended use of antihistamines and steroid nasal spray.  Declined COVID-19 testing at that time.     Current Outpatient Medications   Medication Sig Dispense Refill    semaglutide (OZEMPIC, 1 MG/DOSE,) 4 MG/3ML Subcutaneous Solution Pen-injector Inject 1 mg into the skin every 7 days. 3 each 0    cefdinir 300 MG Oral Cap Take 1 capsule (300 mg total) by mouth 2 (two) times daily. 20 capsule 0    methylPREDNISolone (MEDROL) 4 MG Oral Tablet Therapy Pack As directed. 1 each 0    losartan-hydroCHLOROthiazide 50-12.5 MG Oral Tab Take 1 tablet by mouth daily. 90 tablet 1    buPROPion 75 MG Oral Tab Take 1 tablet (75 mg total) by mouth 2 (two) times daily. 180 tablet 1    glipiZIDE ER 10 MG Oral Tablet 24 Hr Take 1 tablet (10 mg total) by mouth 2 (two) times daily. 180 tablet 0    metFORMIN  MG Oral Tablet 24 Hr TAKE 1 TABLET BY MOUTH EVERY DAY 90 tablet 0    dapagliflozin (FARXIGA) 10 MG Oral Tab Take 1 tablet (10 mg total) by mouth daily. 90 tablet 0    Omeprazole 40 MG Oral Capsule Delayed Release Take 1 capsule (40 mg total) by mouth daily. 90 capsule 0     atorvastatin 40 MG Oral Tab Take 1 tablet (40 mg total) by mouth nightly. 90 tablet 1    levocetirizine 5 MG Oral Tab Take 1 tablet (5 mg total) by mouth every evening.      Saccharomyces boulardii (FLORASTOR OR) Take 1 tablet by mouth daily.          Past Medical History:   Diagnosis Date    Allergic rhinitis due to pollen 05/07/2012    Diverticulosis of colon (without mention of hemorrhage) 12/15/2008    Esophageal reflux     Essential hypertension, benign 11/05/2007    Family history of breast cancer 01/06/2014    Family history of breast cancer in mother 01/06/2014    High blood pressure     High cholesterol     Obesity, unspecified 11/05/2007    PONV (postoperative nausea and vomiting)     Proteinuria 03/11/2011    Type II or unspecified type diabetes mellitus without mention of complication, not stated as uncontrolled 05/09/2011      Social History:  Social History     Socioeconomic History    Marital status:     Number of children: 2   Occupational History    Occupation:    Tobacco Use    Smoking status: Never    Smokeless tobacco: Never   Vaping Use    Vaping Use: Never used   Substance and Sexual Activity    Alcohol use: No     Alcohol/week: 0.0 standard drinks of alcohol    Drug use: Not Currently     Types: Other-see comments     Comment: CBD gummies at night for arthritis    Sexual activity: Yes     Partners: Male     Birth control/protection: Hysterectomy     Comment: ovaries intact          REVIEW OF SYSTEMS:     Review of Systems   Constitutional:  Negative for activity change, appetite change and fatigue.   HENT:  Positive for congestion, postnasal drip and rhinorrhea. Negative for hearing loss and sore throat.    Eyes:  Positive for redness. Negative for discharge.   Respiratory:  Positive for cough. Negative for shortness of breath and wheezing.    Cardiovascular:  Negative for chest pain.   Gastrointestinal:  Negative for abdominal distention and abdominal pain.   Endocrine:  Negative for cold intolerance and heat intolerance.   Genitourinary:  Negative for difficulty urinating and urgency.   Musculoskeletal:  Negative for arthralgias and back pain.   Skin:  Negative for color change.   Allergic/Immunologic: Negative for environmental allergies.   Neurological:  Negative for dizziness, syncope and headaches.   Hematological:  Negative for adenopathy. Does not bruise/bleed easily.   Psychiatric/Behavioral:  Negative for agitation, behavioral problems and confusion.        EXAM:   /80   Pulse 102   Temp 98.8 °F (37.1 °C) (Temporal)   Ht 5' 6\" (1.676 m)   Wt 181 lb (82.1 kg)   SpO2 96%   BMI 29.21 kg/m²     Physical Exam  Constitutional:       Appearance: Normal appearance.   HENT:      Head: Normocephalic and atraumatic.      Right Ear: Tympanic membrane normal.      Left Ear: Tympanic membrane normal.      Nose: Congestion present.      Mouth/Throat:      Mouth: Mucous membranes are moist.      Pharynx: Oropharynx is clear.   Eyes:      Extraocular Movements: Extraocular movements intact.      Conjunctiva/sclera:      Left eye: Exudate (and erythema) present.      Pupils: Pupils are equal, round, and reactive to light.   Cardiovascular:      Rate and Rhythm: Normal rate and regular rhythm.      Pulses: Normal pulses.      Heart sounds: Normal heart sounds.   Pulmonary:      Effort: Pulmonary effort is normal.      Breath sounds: Normal breath sounds.   Musculoskeletal:         General: Normal range of motion.      Cervical back: Normal range of motion.   Lymphadenopathy:      Cervical: No cervical adenopathy.   Skin:     General: Skin is warm and dry.      Capillary Refill: Capillary refill takes less than 2 seconds.   Neurological:      Mental Status: She is alert and oriented to person, place, and time.   Psychiatric:         Mood and Affect: Mood normal.         Behavior: Behavior normal.          ASSESSMENT AND PLAN:     1. Acute bacterial conjunctivitis of left eye  Eye  drops sent to pharmacy and instruction provided   - polymyxin B-trimethoprim 77597-1.1 UNIT/ML-% Ophthalmic Solution; Place 1 drop into the left eye every 6 (six) hours for 5 days.  Dispense: 10 mL; Refill: 0    2. Acute cough  Prednisone sent to pharmacy and instructions provided.  Patient request antibiotics for cough.  Spoke with patient regarding viral verses bacterial illness and antibiotics not being effective when illness is viral and currently her symptoms present as a viral illness.  - predniSONE 20 MG Oral Tab; Take 1 tablet (20 mg total) by mouth daily for 5 days.  Dispense: 5 tablet; Refill: 0    The patient indicates understanding of these issues and agrees to the plan.      Julieth Russ, APRN  3/7/2024

## 2024-03-08 LAB
ALBUMIN/GLOBULIN RATIO: 1.6 (CALC) (ref 1–2.5)
ALBUMIN: 4.2 G/DL (ref 3.6–5.1)
ALKALINE PHOSPHATASE: 99 U/L (ref 37–153)
ALT: 26 U/L (ref 6–29)
AST: 19 U/L (ref 10–35)
BILIRUBIN, TOTAL: 0.7 MG/DL (ref 0.2–1.2)
BUN: 12 MG/DL (ref 7–25)
CALCIUM: 9.6 MG/DL (ref 8.6–10.4)
CARBON DIOXIDE: 26 MMOL/L (ref 20–32)
CHLORIDE: 97 MMOL/L (ref 98–110)
CHOL/HDLC RATIO: 4.4 (CALC)
CHOLESTEROL, TOTAL: 190 MG/DL
CREATININE, RANDOM URINE: 100 MG/DL (ref 20–275)
CREATININE: 0.72 MG/DL (ref 0.5–1.05)
EGFR: 93 ML/MIN/1.73M2
GLOBULIN: 2.6 G/DL (CALC) (ref 1.9–3.7)
GLUCOSE: 190 MG/DL (ref 65–99)
HDL CHOLESTEROL: 43 MG/DL
HEMOGLOBIN A1C: 9.2 % OF TOTAL HGB
LDL-CHOLESTEROL: 106 MG/DL (CALC)
MICROALBUMIN/CREATININE RATIO, RANDOM URINE: 278 MCG/MG CREAT
MICROALBUMIN: 27.8 MG/DL
NON-HDL CHOLESTEROL: 147 MG/DL (CALC)
POTASSIUM: 3.5 MMOL/L (ref 3.5–5.3)
PROTEIN, TOTAL: 6.8 G/DL (ref 6.1–8.1)
SODIUM: 137 MMOL/L (ref 135–146)
TRIGLYCERIDES: 290 MG/DL

## 2024-03-15 DIAGNOSIS — N18.2 TYPE 2 DIABETES MELLITUS WITH STAGE 2 CHRONIC KIDNEY DISEASE, WITHOUT LONG-TERM CURRENT USE OF INSULIN (HCC): Primary | ICD-10-CM

## 2024-03-15 DIAGNOSIS — E11.22 TYPE 2 DIABETES MELLITUS WITH STAGE 2 CHRONIC KIDNEY DISEASE, WITHOUT LONG-TERM CURRENT USE OF INSULIN (HCC): Primary | ICD-10-CM

## 2024-03-24 DIAGNOSIS — E11.22 TYPE 2 DIABETES MELLITUS WITH STAGE 2 CHRONIC KIDNEY DISEASE, WITHOUT LONG-TERM CURRENT USE OF INSULIN (HCC): ICD-10-CM

## 2024-03-24 DIAGNOSIS — N18.2 TYPE 2 DIABETES MELLITUS WITH STAGE 2 CHRONIC KIDNEY DISEASE, WITHOUT LONG-TERM CURRENT USE OF INSULIN (HCC): ICD-10-CM

## 2024-03-24 DIAGNOSIS — K21.9 GASTROESOPHAGEAL REFLUX DISEASE WITHOUT ESOPHAGITIS: ICD-10-CM

## 2024-03-24 DIAGNOSIS — E78.5 HYPERLIPIDEMIA ASSOCIATED WITH TYPE 2 DIABETES MELLITUS (HCC): ICD-10-CM

## 2024-03-24 DIAGNOSIS — E11.65 TYPE 2 DIABETES MELLITUS WITH HYPERGLYCEMIA, WITHOUT LONG-TERM CURRENT USE OF INSULIN (HCC): ICD-10-CM

## 2024-03-24 DIAGNOSIS — E11.69 HYPERLIPIDEMIA ASSOCIATED WITH TYPE 2 DIABETES MELLITUS (HCC): ICD-10-CM

## 2024-03-25 DIAGNOSIS — E11.22 TYPE 2 DIABETES MELLITUS WITH DIABETIC CHRONIC KIDNEY DISEASE (HCC): ICD-10-CM

## 2024-03-25 DIAGNOSIS — E11.65 TYPE 2 DIABETES MELLITUS WITH HYPERGLYCEMIA, WITHOUT LONG-TERM CURRENT USE OF INSULIN (HCC): ICD-10-CM

## 2024-03-25 DIAGNOSIS — E11.69 HYPERLIPIDEMIA ASSOCIATED WITH TYPE 2 DIABETES MELLITUS (HCC): ICD-10-CM

## 2024-03-25 DIAGNOSIS — E11.22 TYPE 2 DIABETES MELLITUS WITH STAGE 2 CHRONIC KIDNEY DISEASE, WITHOUT LONG-TERM CURRENT USE OF INSULIN (HCC): ICD-10-CM

## 2024-03-25 DIAGNOSIS — I10 ESSENTIAL HYPERTENSION, BENIGN: ICD-10-CM

## 2024-03-25 DIAGNOSIS — E78.5 HYPERLIPIDEMIA ASSOCIATED WITH TYPE 2 DIABETES MELLITUS (HCC): ICD-10-CM

## 2024-03-25 DIAGNOSIS — N18.2 TYPE 2 DIABETES MELLITUS WITH STAGE 2 CHRONIC KIDNEY DISEASE, WITHOUT LONG-TERM CURRENT USE OF INSULIN (HCC): ICD-10-CM

## 2024-03-25 DIAGNOSIS — K21.9 GASTROESOPHAGEAL REFLUX DISEASE WITHOUT ESOPHAGITIS: ICD-10-CM

## 2024-03-26 DIAGNOSIS — E11.22 TYPE 2 DIABETES MELLITUS WITH DIABETIC CHRONIC KIDNEY DISEASE (HCC): ICD-10-CM

## 2024-03-26 RX ORDER — SEMAGLUTIDE 1.34 MG/ML
1 INJECTION, SOLUTION SUBCUTANEOUS
Qty: 3 EACH | Refills: 0 | OUTPATIENT
Start: 2024-03-26

## 2024-03-26 RX ORDER — METFORMIN HYDROCHLORIDE 750 MG/1
TABLET, EXTENDED RELEASE ORAL
Qty: 90 TABLET | Refills: 0 | OUTPATIENT
Start: 2024-03-26

## 2024-03-26 RX ORDER — DAPAGLIFLOZIN 10 MG/1
10 TABLET, FILM COATED ORAL DAILY
Qty: 90 TABLET | Refills: 0 | OUTPATIENT
Start: 2024-03-26

## 2024-03-26 RX ORDER — OMEPRAZOLE 40 MG/1
40 CAPSULE, DELAYED RELEASE ORAL DAILY
Qty: 90 CAPSULE | Refills: 0 | OUTPATIENT
Start: 2024-03-26

## 2024-03-26 RX ORDER — GLIPIZIDE 10 MG/1
10 TABLET, FILM COATED, EXTENDED RELEASE ORAL 2 TIMES DAILY
Qty: 180 TABLET | Refills: 0 | OUTPATIENT
Start: 2024-03-26

## 2024-03-26 RX ORDER — LOSARTAN POTASSIUM AND HYDROCHLOROTHIAZIDE 12.5; 5 MG/1; MG/1
1 TABLET ORAL DAILY
Qty: 90 TABLET | Refills: 1 | OUTPATIENT
Start: 2024-03-26 | End: 2024-09-22

## 2024-03-26 NOTE — TELEPHONE ENCOUNTER
LOV: 9/26/23    LAST LAB 3/7/24 Hga1c 9.2    LAST RX:  glipiZIDE ER 10 MG Oral Tablet 24 Hr 180 tablet 0 12/26/2023 --   Sig:   Take 1 tablet (10 mg total) by mouth 2 (two) times daily.         dapagliflozin (FARXIGA) 10 MG Oral Tab 90 tablet 0 12/26/2023 --   Sig:   Take 1 tablet (10 mg total) by mouth daily.       Omeprazole 40 MG Oral Capsule Delayed Release 90 capsule 0 12/26/2023 --   Sig:   Take 1 capsule (40 mg total) by mouth daily.       Medication Quantity Refills Start End   glipiZIDE ER 10 MG Oral Tablet 24 Hr 180 tablet 0 12/26/2023 --   Sig:   Take 1 tablet (10 mg total) by mouth 2 (two) times daily.       Next OV:   Future Appointments   Date Time Provider Department Center   4/1/2024  1:00 PM Delgado Ravi MD [facility] [place]    PROTOCOL      Gastrointestional Medication Protocol [id number]/24/2024 07:35 AM   Protocol Details In person appointment or virtual visit in the past 12 mos or appointment in next 3 mos          Diabetes Medication Protocol [id number]/24/2024 07:35 AM   Protocol Details Last A1C < 7.5 and within past 6 months    In person appointment or virtual visit in the past 6 mos or appointment in next 3 mos    Microalbumin procedure in past 12 months or taking ACE/ARB    EGFRCR or GFRNAA > 50    GFR in the past 12 months

## 2024-03-27 RX ORDER — DAPAGLIFLOZIN 10 MG/1
10 TABLET, FILM COATED ORAL DAILY
Qty: 90 TABLET | Refills: 0 | Status: SHIPPED | OUTPATIENT
Start: 2024-03-27

## 2024-03-27 RX ORDER — METFORMIN HYDROCHLORIDE 750 MG/1
TABLET, EXTENDED RELEASE ORAL
Qty: 90 TABLET | Refills: 0 | Status: SHIPPED | OUTPATIENT
Start: 2024-03-27

## 2024-03-27 RX ORDER — SEMAGLUTIDE 1.34 MG/ML
1 INJECTION, SOLUTION SUBCUTANEOUS
Qty: 9 ML | Refills: 0 | Status: SHIPPED | OUTPATIENT
Start: 2024-03-27

## 2024-03-27 RX ORDER — OMEPRAZOLE 40 MG/1
40 CAPSULE, DELAYED RELEASE ORAL DAILY
Qty: 90 CAPSULE | Refills: 0 | Status: SHIPPED | OUTPATIENT
Start: 2024-03-27

## 2024-03-27 RX ORDER — GLIPIZIDE 10 MG/1
10 TABLET, FILM COATED, EXTENDED RELEASE ORAL 2 TIMES DAILY
Qty: 180 TABLET | Refills: 0 | Status: SHIPPED | OUTPATIENT
Start: 2024-03-27

## 2024-03-27 NOTE — TELEPHONE ENCOUNTER
LOV 3/7/24  LAST LAB: 3/7/24  LAST RX:  Medication Quantity Refills Start End   semaglutide (OZEMPIC, 1 MG/DOSE,) 4 MG/3ML Subcutaneous Solution Pen-injector 3 each 0 1/24/2024 --   Sig:   Inject 1 mg into the skin every 7 days.     Next OV:  Future Appointments   Date Time Provider Department Center   4/1/2024  1:00 PM Delgado Ravi MD [facility] [place]    PROTOCOL  Diabetes Medication Protocol [id number]/26/2024 05:30 PM   Protocol Details Last A1C < 7.5 and within past 6 months    In person appointment or virtual visit in the past 6 mos or appointment in next 3 mos    Microalbumin procedure in past 12 months or taking ACE/ARB    EGFRCR or GFRNAA > 50    GFR in the past 12 months

## 2024-05-19 DIAGNOSIS — E11.69 HYPERLIPIDEMIA ASSOCIATED WITH TYPE 2 DIABETES MELLITUS (HCC): ICD-10-CM

## 2024-05-19 DIAGNOSIS — E78.5 HYPERLIPIDEMIA ASSOCIATED WITH TYPE 2 DIABETES MELLITUS (HCC): ICD-10-CM

## 2024-05-20 RX ORDER — ATORVASTATIN CALCIUM 40 MG/1
40 TABLET, FILM COATED ORAL NIGHTLY
Qty: 90 TABLET | Refills: 1 | Status: SHIPPED | OUTPATIENT
Start: 2024-05-20

## 2024-05-20 NOTE — TELEPHONE ENCOUNTER
Last refill: 10/30/23  Qty: 90  W/ 1 refills  Last ov: 03/07/24    Requested Prescriptions     Pending Prescriptions Disp Refills    atorvastatin 40 MG Oral Tab 90 tablet 1     Sig: Take 1 tablet (40 mg total) by mouth nightly.     No future appointments.

## 2024-06-18 DIAGNOSIS — N18.2 TYPE 2 DIABETES MELLITUS WITH STAGE 2 CHRONIC KIDNEY DISEASE, WITHOUT LONG-TERM CURRENT USE OF INSULIN (HCC): ICD-10-CM

## 2024-06-18 DIAGNOSIS — E11.22 TYPE 2 DIABETES MELLITUS WITH STAGE 2 CHRONIC KIDNEY DISEASE, WITHOUT LONG-TERM CURRENT USE OF INSULIN (HCC): ICD-10-CM

## 2024-06-18 DIAGNOSIS — E11.69 HYPERLIPIDEMIA ASSOCIATED WITH TYPE 2 DIABETES MELLITUS (HCC): ICD-10-CM

## 2024-06-18 DIAGNOSIS — E11.65 TYPE 2 DIABETES MELLITUS WITH HYPERGLYCEMIA, WITHOUT LONG-TERM CURRENT USE OF INSULIN (HCC): ICD-10-CM

## 2024-06-18 DIAGNOSIS — E78.5 HYPERLIPIDEMIA ASSOCIATED WITH TYPE 2 DIABETES MELLITUS (HCC): ICD-10-CM

## 2024-06-18 RX ORDER — GLIPIZIDE 10 MG/1
10 TABLET, FILM COATED, EXTENDED RELEASE ORAL 2 TIMES DAILY
Qty: 180 TABLET | Refills: 0 | Status: SHIPPED | OUTPATIENT
Start: 2024-06-18

## 2024-06-18 RX ORDER — METFORMIN HYDROCHLORIDE 750 MG/1
TABLET, EXTENDED RELEASE ORAL
Qty: 30 TABLET | Refills: 0 | Status: SHIPPED | OUTPATIENT
Start: 2024-06-18 | End: 2024-06-19

## 2024-06-18 RX ORDER — DAPAGLIFLOZIN 10 MG/1
10 TABLET, FILM COATED ORAL DAILY
Qty: 30 TABLET | Refills: 0 | Status: SHIPPED | OUTPATIENT
Start: 2024-06-18 | End: 2024-06-19

## 2024-06-18 NOTE — TELEPHONE ENCOUNTER
Please approve if appropriate; allergy interactions  Last refill: 03/27/24  qtY 180  W/ 0 refills  Last ov: 09/26/23  Last labs  03/07/24  Requested Prescriptions     Pending Prescriptions Disp Refills    GLIPIZIDE ER 10 MG Oral Tablet 24 Hr [Pharmacy Med Name: GLIPIZIDE ER 10 MG TABLET] 180 tablet 0     Sig: TAKE 1 TABLET BY MOUTH TWICE A DAY     No future appointments.

## 2024-06-19 DIAGNOSIS — E11.65 TYPE 2 DIABETES MELLITUS WITH HYPERGLYCEMIA, WITHOUT LONG-TERM CURRENT USE OF INSULIN (HCC): ICD-10-CM

## 2024-06-19 DIAGNOSIS — E78.5 HYPERLIPIDEMIA ASSOCIATED WITH TYPE 2 DIABETES MELLITUS (HCC): ICD-10-CM

## 2024-06-19 DIAGNOSIS — E11.69 HYPERLIPIDEMIA ASSOCIATED WITH TYPE 2 DIABETES MELLITUS (HCC): ICD-10-CM

## 2024-06-19 DIAGNOSIS — N18.2 TYPE 2 DIABETES MELLITUS WITH STAGE 2 CHRONIC KIDNEY DISEASE, WITHOUT LONG-TERM CURRENT USE OF INSULIN (HCC): ICD-10-CM

## 2024-06-19 DIAGNOSIS — E11.22 TYPE 2 DIABETES MELLITUS WITH STAGE 2 CHRONIC KIDNEY DISEASE, WITHOUT LONG-TERM CURRENT USE OF INSULIN (HCC): ICD-10-CM

## 2024-06-19 RX ORDER — METFORMIN HYDROCHLORIDE 750 MG/1
TABLET, EXTENDED RELEASE ORAL
Qty: 90 TABLET | Refills: 0 | OUTPATIENT
Start: 2024-06-19

## 2024-06-19 RX ORDER — METFORMIN HYDROCHLORIDE 750 MG/1
TABLET, EXTENDED RELEASE ORAL
Qty: 90 TABLET | Refills: 0 | Status: SHIPPED | OUTPATIENT
Start: 2024-06-19

## 2024-06-19 RX ORDER — DAPAGLIFLOZIN 10 MG/1
10 TABLET, FILM COATED ORAL DAILY
Qty: 90 TABLET | Refills: 0 | Status: SHIPPED | OUTPATIENT
Start: 2024-06-19

## 2024-06-19 NOTE — TELEPHONE ENCOUNTER
NEEDS REFILL FOR 90 DAYS SENT IN INSTEAD 30 DAYS FOR metFORMIN  MG Oral Tablet 24 Hr   dapagliflozin (FARXIGA) 10 MG Oral Tab [facility]. COST OF MEDS CURRENTLY IS THE SAME FOR 30 DAY OR 90 DAY AND WILL BE SWITCHING TO MEDICARE AND WOULD LIKE TO GET PRESCRIPTION BEFORE THE SWITCH, AND ALSO SAID WOULD LIKE TO WAIT UNTIL SWITCH IS COMPLETED TO HAVE HER LABS AND BLOODWORK

## 2024-06-19 NOTE — TELEPHONE ENCOUNTER
See intake message patient is requesting 90 day prescriptions for :  Metformin 750 mg and Farxiga 10 mg    LOV  3-7-24 APRN  9-26-23 Dr Ravi    LAST LAB  3-8-24 Chem Profile gfr 93                      3-8-24 HgbA1c 9.2                      3-8-24 Microalbumin    LAST RX  Metformin 3-27-24  #90                    Farxiga 3-27-24  #90    Next OV  No future appointments.      PROTOCOL  Diabetes Medication Protocol Failed06/19/2024 11:54 AM   Protocol Details Last A1C < 7.5 and within past 6 months    In person appointment or virtual visit in the past 6 mos or appointment in next 3 mos    Microalbumin procedure in past 12 months or taking ACE/ARB    EGFRCR or GFRNAA > 50    GFR in the past 12 months

## 2024-06-27 DIAGNOSIS — I10 ESSENTIAL HYPERTENSION, BENIGN: ICD-10-CM

## 2024-06-27 DIAGNOSIS — E11.22 TYPE 2 DIABETES MELLITUS WITH DIABETIC CHRONIC KIDNEY DISEASE (HCC): ICD-10-CM

## 2024-06-27 DIAGNOSIS — K21.9 GASTROESOPHAGEAL REFLUX DISEASE WITHOUT ESOPHAGITIS: ICD-10-CM

## 2024-06-27 RX ORDER — LOSARTAN POTASSIUM AND HYDROCHLOROTHIAZIDE 12.5; 5 MG/1; MG/1
1 TABLET ORAL DAILY
Qty: 90 TABLET | Refills: 0 | Status: SHIPPED | OUTPATIENT
Start: 2024-06-27 | End: 2024-09-24

## 2024-06-27 RX ORDER — OMEPRAZOLE 40 MG/1
40 CAPSULE, DELAYED RELEASE ORAL DAILY
Qty: 90 CAPSULE | Refills: 0 | Status: SHIPPED | OUTPATIENT
Start: 2024-06-27 | End: 2024-09-24

## 2024-06-27 RX ORDER — SEMAGLUTIDE 1.34 MG/ML
1 INJECTION, SOLUTION SUBCUTANEOUS
Qty: 9 ML | Refills: 0 | Status: SHIPPED | OUTPATIENT
Start: 2024-06-27 | End: 2024-09-23

## 2024-06-27 RX ORDER — BUPROPION HYDROCHLORIDE 75 MG/1
75 TABLET ORAL 2 TIMES DAILY
Qty: 180 TABLET | Refills: 0 | Status: SHIPPED | OUTPATIENT
Start: 2024-06-27

## 2024-06-27 NOTE — TELEPHONE ENCOUNTER
LOV: 9/26/23      LAST LAB: 3/7/24      LAST RX:    losartan-hydroCHLOROthiazide 50-12.5 MG Oral Tab 90 tablet 1 1/3/2024 7/1/2024   Sig:   Take 1 tablet by mouth daily.       Medication Quantity Refills Start End   semaglutide (OZEMPIC, 1 MG/DOSE,) 4 MG/3ML Subcutaneous Solution Pen-injector 9 mL 0 3/27/2024 --   Sig:   INJECT 1 MG INTO THE SKIN EVERY 7 DAYS       Medication Quantity Refills Start End   Omeprazole 40 MG Oral Capsule Delayed Release 90 capsule 0 3/27/2024 --   Sig:   Take 1 capsule (40 mg total) by mouth daily.       buPROPion 75 MG Oral Tab 180 tablet 1 1/3/2024 --   Sig:   Take 1 tablet (75 mg total) by mouth 2 (two) times daily.       Next OV: No future appointments.       PROTOCOL: none

## 2024-07-29 ENCOUNTER — TELEPHONE (OUTPATIENT)
Dept: FAMILY MEDICINE CLINIC | Facility: CLINIC | Age: 64
End: 2024-07-29

## 2024-08-08 ENCOUNTER — OFFICE VISIT (OUTPATIENT)
Dept: FAMILY MEDICINE CLINIC | Facility: CLINIC | Age: 64
End: 2024-08-08
Payer: COMMERCIAL

## 2024-08-08 VITALS
WEIGHT: 175 LBS | BODY MASS INDEX: 29.16 KG/M2 | HEART RATE: 94 BPM | RESPIRATION RATE: 18 BRPM | HEIGHT: 65 IN | TEMPERATURE: 98 F | SYSTOLIC BLOOD PRESSURE: 116 MMHG | OXYGEN SATURATION: 97 % | DIASTOLIC BLOOD PRESSURE: 74 MMHG

## 2024-08-08 DIAGNOSIS — J01.00 ACUTE NON-RECURRENT MAXILLARY SINUSITIS: Primary | ICD-10-CM

## 2024-08-08 PROCEDURE — 99213 OFFICE O/P EST LOW 20 MIN: CPT | Performed by: NURSE PRACTITIONER

## 2024-08-08 RX ORDER — DOXYCYCLINE HYCLATE 100 MG
100 TABLET ORAL 2 TIMES DAILY
Qty: 14 TABLET | Refills: 0 | Status: SHIPPED | OUTPATIENT
Start: 2024-08-08 | End: 2024-08-15

## 2024-08-08 NOTE — PATIENT INSTRUCTIONS
1. Rest. Drink plenty of fluids.     2. Supportive care as discussed.     3. Doxycycline as prescribed.     4. Follow up with PMD in 4-5 days for re-eval. Go to the emergency department immediately if symptoms worsen, change, you develop chest discomfort, wheezing, shortness of breath, or if you have any concerns.

## 2024-08-08 NOTE — PROGRESS NOTES
CHIEF COMPLAINT:     Chief Complaint   Patient presents with    Sinus Problem     6 days, sinus pressure  OTC Tylenol Sinus,       HPI:   Annmarie Goldman is a 64 year old female who presents for concerns of a sinus infection. Patient reports symptoms present x 7-8 days.  Denies any fevers, cough,wheezing, chest discomfort, or shortness of breath. .  Treating symptoms with antihistamine and flonase.   Tolerates PO well at home. No n/v/d.  Denies any other aggravating or relieving factors at home. Denies any other treatment attempts prior to arrival.   Denies known covid-19 or strep exposure.     Current Outpatient Medications   Medication Sig Dispense Refill    Doxycycline Hyclate 100 MG Oral Tab Take 1 tablet (100 mg total) by mouth 2 (two) times daily for 7 days. 14 tablet 0    losartan-hydroCHLOROthiazide 50-12.5 MG Oral Tab Take 1 tablet by mouth daily. 90 tablet 0    buPROPion 75 MG Oral Tab Take 1 tablet (75 mg total) by mouth 2 (two) times daily. 180 tablet 0    Omeprazole 40 MG Oral Capsule Delayed Release Take 1 capsule (40 mg total) by mouth daily. 90 capsule 0    semaglutide (OZEMPIC, 1 MG/DOSE,) 4 MG/3ML Subcutaneous Solution Pen-injector Inject 1 mg into the skin every 7 days. 9 mL 0    dapagliflozin (FARXIGA) 10 MG Oral Tab Take 1 tablet (10 mg total) by mouth daily. 90 tablet 0    metFORMIN  MG Oral Tablet 24 Hr TAKE 1 TABLET BY MOUTH EVERY DAY 90 tablet 0    GLIPIZIDE ER 10 MG Oral Tablet 24 Hr TAKE 1 TABLET BY MOUTH TWICE A  tablet 0    atorvastatin 40 MG Oral Tab Take 1 tablet (40 mg total) by mouth nightly. 90 tablet 1    clobetasol 0.05 % External Ointment Twice daily with itching then twice weekly      levocetirizine 5 MG Oral Tab Take 1 tablet (5 mg total) by mouth every evening.      Saccharomyces boulardii (FLORASTOR OR) Take 1 tablet by mouth daily.          Past Medical History:    Allergic rhinitis due to pollen    Diverticulosis of colon (without mention of hemorrhage)     Esophageal reflux    Essential hypertension, benign    Family history of breast cancer    Family history of breast cancer in mother    High blood pressure    High cholesterol    Obesity, unspecified    PONV (postoperative nausea and vomiting)    Proteinuria    Type II or unspecified type diabetes mellitus without mention of complication, not stated as uncontrolled      Past Surgical History:   Procedure Laterality Date    Appendectomy      Breast surgery  1977    Cholecystectomy  04/16/2022    Colectomy  12/2014    removed 10 inches of colon for diverticulitis    Colonoscopy  11/14    diverticulitis    Hand/finger surgery unlisted Left 02/2018    Hysterectomy  1994    LAVH for endometriosis (Ovaries left intact)    Knee surgery Left 04/19/2019    Leg/ankle surgery proc unlisted  1996    right ankle    Other      ARTHROSCOPIC KNEE SURGERY BILAT    Other      \"BRACHIAL CEFT (LEFT?)CYST\"    Other surgical history  2012    cyst on back removed    Reduction left  1976    Reduction right  1976         Social History     Socioeconomic History    Marital status:     Number of children: 2   Occupational History    Occupation:    Tobacco Use    Smoking status: Never    Smokeless tobacco: Never   Vaping Use    Vaping status: Never Used   Substance and Sexual Activity    Alcohol use: No     Alcohol/week: 0.0 standard drinks of alcohol    Drug use: Not Currently     Types: Other-see comments     Comment: CBD gummies at night for arthritis    Sexual activity: Yes     Partners: Male     Birth control/protection: Hysterectomy     Comment: ovaries intact     Social Determinants of Health     Food Insecurity: No Food Insecurity (6/29/2023)    [facility], [facility]    Food Insecurity     Currently or in the past 3 months, have you worried your food would run out before you had money to buy more?: No     In the past 12 months, have you run out of food or been  unable to get more?: No    [facility], [facility]    Housing Stability         REVIEW OF SYSTEMS:   GENERAL: Denies fever. Notes good appetite  SKIN: no rashes or abnormal skin lesions  HEENT: Denies sore throat, + sinus symptoms, Denies ear pain  LUNGS: Denies cough, denies shortness of breath or wheezing,   CARDIOVASCULAR: denies chest pain or palpitations   GI: denies N/V/C or abdominal pain  NEURO: Denies headaches    EXAM:   /74   Pulse 94   Temp 97.5 °F (36.4 °C)   Resp 18   Ht 5' 5\" (1.651 m)   Wt 175 lb (79.4 kg)   SpO2 97%   BMI 29.12 kg/m²   GENERAL: well developed, well nourished,in no apparent distress  SKIN: no rashes,no suspicious lesions  HEAD: atraumatic, normocephalic.    EYES: conjunctiva clear  EARS: TM's intact and without erythema, no bulging, no retraction, appears slightly dusky, bony landmarks visualized. No erythema or swelling noted to ear canals or external ears.   NOSE: Nostrils patent, dried yellow nasal discharge, nasal mucosa reddened   THROAT: Oral mucosa pink, moist. Posterior pharynx is mildly erythematous. No exudates. No tonsillar hypertrophy noted.  No trismus. Uvula midline with no swelling. Voice clear/normal. No stridor  NECK: Supple, non-tender  LUNGS: clear to auscultation bilaterally, no rales, wheezes or rhonchi. Breathing is non labored.  CARDIO: RRR without murmur  EXTREMITIES: no cyanosis, clubbing or edema  LYMPH:  No lymphadenopathy.        ASSESSMENT AND PLAN:       ICD-10-CM    1. Acute non-recurrent maxillary sinusitis  J01.00 Doxycycline Hyclate 100 MG Oral Tab          Covid-19 testing declined.    Discussed physical exam and hpi with pt. Pt has reassuring physical exam consistent with sinusitis. We discussed viral vs allergy vs bacterial etiologies associated with sinusitis. Pt notes she would like to start abx today and declined delayed antimicrobial therapy option. Treatment options discussed  with patient and explained in detail. Will start doxycycline along with supportive care. The risks, benefits and potential side effects of possible medications were reviewed. Alternatives were discussed. Monitoring parameters and expected course outlined. Patient to call PCP or go to emergency department if symptoms fail to respond as outlined, or worsen in any way. The patient agreed with the plan.       Patient Instructions   1. Rest. Drink plenty of fluids.     2. Supportive care as discussed.     3. Doxycycline as prescribed.     4. Follow up with PMD in 4-5 days for re-eval. Go to the emergency department immediately if symptoms worsen, change, you develop chest discomfort, wheezing, shortness of breath, or if you have any concerns.

## 2024-08-19 ENCOUNTER — TELEPHONE (OUTPATIENT)
Dept: FAMILY MEDICINE CLINIC | Facility: CLINIC | Age: 64
End: 2024-08-19

## 2024-08-19 DIAGNOSIS — E11.69 HYPERLIPIDEMIA ASSOCIATED WITH TYPE 2 DIABETES MELLITUS (HCC): ICD-10-CM

## 2024-08-19 DIAGNOSIS — E78.5 HYPERLIPIDEMIA ASSOCIATED WITH TYPE 2 DIABETES MELLITUS (HCC): ICD-10-CM

## 2024-08-19 NOTE — TELEPHONE ENCOUNTER
Refill atorvastatin 40 MG Oral Tab to [facility] Morrow has medicare wellness appointment 9-3-24

## 2024-08-19 NOTE — TELEPHONE ENCOUNTER
Last refill on atorvastatin 5/20/24 #90  x1  Refill remains on script  Left message for [facility] to prepare this script.

## 2024-09-03 ENCOUNTER — OFFICE VISIT (OUTPATIENT)
Dept: FAMILY MEDICINE CLINIC | Facility: CLINIC | Age: 64
End: 2024-09-03
Payer: MEDICARE

## 2024-09-03 VITALS
RESPIRATION RATE: 12 BRPM | OXYGEN SATURATION: 97 % | HEART RATE: 99 BPM | SYSTOLIC BLOOD PRESSURE: 128 MMHG | DIASTOLIC BLOOD PRESSURE: 70 MMHG | WEIGHT: 183.38 LBS | BODY MASS INDEX: 30.93 KG/M2 | HEIGHT: 64.5 IN | TEMPERATURE: 98 F

## 2024-09-03 DIAGNOSIS — E78.5 HYPERLIPIDEMIA ASSOCIATED WITH TYPE 2 DIABETES MELLITUS (HCC): ICD-10-CM

## 2024-09-03 DIAGNOSIS — I10 ESSENTIAL HYPERTENSION, BENIGN: ICD-10-CM

## 2024-09-03 DIAGNOSIS — K21.9 GASTROESOPHAGEAL REFLUX DISEASE WITHOUT ESOPHAGITIS: ICD-10-CM

## 2024-09-03 DIAGNOSIS — E78.2 MIXED HYPERLIPIDEMIA: ICD-10-CM

## 2024-09-03 DIAGNOSIS — R19.5 LOOSE STOOLS: ICD-10-CM

## 2024-09-03 DIAGNOSIS — E11.69 HYPERLIPIDEMIA ASSOCIATED WITH TYPE 2 DIABETES MELLITUS (HCC): ICD-10-CM

## 2024-09-03 DIAGNOSIS — Z12.11 COLON CANCER SCREENING: ICD-10-CM

## 2024-09-03 DIAGNOSIS — K57.30 DIVERTICULOSIS OF COLON: ICD-10-CM

## 2024-09-03 DIAGNOSIS — Z13.29 SCREENING FOR THYROID DISORDER: ICD-10-CM

## 2024-09-03 DIAGNOSIS — Z90.49 S/P PARTIAL RESECTION OF COLON: ICD-10-CM

## 2024-09-03 DIAGNOSIS — E66.09 CLASS 1 OBESITY DUE TO EXCESS CALORIES WITHOUT SERIOUS COMORBIDITY WITH BODY MASS INDEX (BMI) OF 30.0 TO 30.9 IN ADULT: ICD-10-CM

## 2024-09-03 DIAGNOSIS — M17.12 PRIMARY OSTEOARTHRITIS OF LEFT KNEE: ICD-10-CM

## 2024-09-03 DIAGNOSIS — Z79.899 ENCOUNTER FOR LONG-TERM (CURRENT) USE OF MEDICATIONS: ICD-10-CM

## 2024-09-03 DIAGNOSIS — E11.65 TYPE 2 DIABETES MELLITUS WITH HYPERGLYCEMIA, WITHOUT LONG-TERM CURRENT USE OF INSULIN (HCC): ICD-10-CM

## 2024-09-03 DIAGNOSIS — Z00.00 ENCOUNTER FOR ANNUAL HEALTH EXAMINATION: Primary | ICD-10-CM

## 2024-09-03 PROCEDURE — 99214 OFFICE O/P EST MOD 30 MIN: CPT | Performed by: FAMILY MEDICINE

## 2024-09-03 PROCEDURE — G0402 INITIAL PREVENTIVE EXAM: HCPCS | Performed by: FAMILY MEDICINE

## 2024-09-03 NOTE — ASSESSMENT & PLAN NOTE
Stable    [x] chronic stable condition, noted historically, continues present status  On the medications

## 2024-09-03 NOTE — ASSESSMENT & PLAN NOTE
Cholesterol shows Good control. Long term heart-healthy diet and lifestyle discussed and encouraged to reduce risk of cardiovascular disease.  3/7/2024: CHOLESTEROL, TOTAL 190; HDL CHOLESTEROL 43 (L); TRIGLYCERIDES 290 (H); LDL-CHOLESTEROL 106 (H)  Cholesterol Meds: atorvastatin Tabs - 40 MG  stable  Continue with current treatment plan

## 2024-09-03 NOTE — ASSESSMENT & PLAN NOTE
BP shows good control with last BP of 128/70. Continue lifestyle changes, diet, exercise and weight loss.   3/7/2024: POTASSIUM 3.5; CREATININE 0.72; EGFR 93  BP Meds: losartan-hydroCHLOROthiazide Tabs - 50-12.5 MG

## 2024-09-03 NOTE — ASSESSMENT & PLAN NOTE
Status post partial colectomy   Stable    [x] chronic stable condition, noted historically, continues present status

## 2024-09-03 NOTE — ASSESSMENT & PLAN NOTE
Stable    [x] chronic stable condition, noted historically, continues present status  Ambulating well  continue motion

## 2024-09-03 NOTE — ASSESSMENT & PLAN NOTE
Weight static  Will increase the ozempic and told her to watch diet better    She is exercising by report

## 2024-09-03 NOTE — ASSESSMENT & PLAN NOTE
Stable    [x] chronic stable condition, noted historically, continues present status  New loose stoools    See med adjustment  Refer to GI

## 2024-09-03 NOTE — PROGRESS NOTES
Subjective:   Annmarie Goldman is a 64 year old female who presents for a Medicare Initial Preventative Physical Exam (Welcome to Medicare- < 12 months on Medicare) and scheduled follow up of multiple significant but stable problems.       Cholesterol shows Fair control. Long term heart-healthy diet and lifestyle discussed and encouraged to reduce risk of cardiovascular disease.  Cholesterol: 190, done on 3/7/2024.  HDL Cholesterol: 43, done on 3/7/2024.  TriGlycerides 290, done on 3/7/2024.  LDL Cholesterol: 106, done on 3/7/2024.   Cholesterol medications include atorvastatin 40 MG Oral Tab [[id number]].    A1c is 9.2 done 3/7/2024 which shows frequent hyperglycemia and poor control! Encouraged better dietary choices and portion control, and increased activity and med changes as listed.  Diabetic medications include GLIPIZIDE ER 10 MG Oral Tablet 24 Hr [[id number]], GLIPIZIDE ER 10 MG Oral Tablet 24 Hr [[id number]] (Long-Term Med), dapagliflozin (FARXIGA) 10 MG Oral Tab [[id number]], dapagliflozin (FARXIGA) 10 MG Oral Tab [[id number]] (Long-Term Med), metFORMIN  MG Oral Tablet 24 Hr [[id number]], semaglutide (OZEMPIC, 1 MG/DOSE,) 4 MG/3ML Subcutaneous Solution Pen-injector [[id number]], semaglutide (OZEMPIC, 1 MG/DOSE,) 4 MG/3ML Subcutaneous Solution Pen-injector [[id number]] (Long-Term Med).    Will stop the metformin due to side effect and add increase ozempic    But wait 3 week due to refill schedule      Has loose stools   possibly metformin  Stop the metformin   encourage more cholestyramine    GERD well controlled on medications     Unclear on refill adherence    Mammogram up to date    Refer for colonoscopy   last 2014       History/Other:   Fall Risk Assessment:   She has been screened for Falls and is High Risk. Fall Prevention information provided to patient in After Visit Summary.    Do you feel unsteady when standing or walking?: No  Do you worry about falling?: No  Have you fallen in the past year?:  Yes  How many times have you fallen?: (P) 1  Were you injured?: (P) No     Cognitive Assessment:   She had a completely normal cognitive assessment - see flowsheet entries     Functional Ability/Status:   Annmarie Goldman has some abnormal functions as listed below:  She has Vision problems based on screening of functional status.       Depression Screening (PHQ):  PHQ-2 SCORE: 0  , done 8/27/2024             Advanced Directives:   She does NOT have a Living Will. [Do you have a living will?: No]  She does NOT have a Power of  for Health Care. [Do you have a healthcare power of ?: No]  Discussed Advance Care Planning with patient (and family/surrogate if present). Standard forms made available to patient in After Visit Summary.      Patient Active Problem List   Diagnosis    Essential hypertension, benign    Obesity, unspecified    Diverticulosis of colon    S/P partial resection of colon    Hyperlipidemia associated with type 2 diabetes mellitus (HCC)    GERD (gastroesophageal reflux disease)    Primary osteoarthritis of left knee    Type 2 diabetes mellitus with hyperglycemia, without long-term current use of insulin (HCC)     Allergies:  She is allergic to biaxin [clarithromycin], penicillins, dog dander [dander], strawberry c [glucose], and sulfa antibiotics.    Current Medications:  Outpatient Medications Marked as Taking for the 9/3/24 encounter (Office Visit) with Delgado Ravi MD   Medication Sig    losartan-hydroCHLOROthiazide 50-12.5 MG Oral Tab Take 1 tablet by mouth daily.    buPROPion 75 MG Oral Tab Take 1 tablet (75 mg total) by mouth 2 (two) times daily.    Omeprazole 40 MG Oral Capsule Delayed Release Take 1 capsule (40 mg total) by mouth daily.    semaglutide (OZEMPIC, 1 MG/DOSE,) 4 MG/3ML Subcutaneous Solution Pen-injector Inject 1 mg into the skin every 7 days.    dapagliflozin (FARXIGA) 10 MG Oral Tab Take 1 tablet (10 mg total) by mouth daily.    metFORMIN  MG Oral  Tablet 24 Hr TAKE 1 TABLET BY MOUTH EVERY DAY    GLIPIZIDE ER 10 MG Oral Tablet 24 Hr TAKE 1 TABLET BY MOUTH TWICE A DAY    atorvastatin 40 MG Oral Tab Take 1 tablet (40 mg total) by mouth nightly.    levocetirizine 5 MG Oral Tab Take 1 tablet (5 mg total) by mouth every evening.    Saccharomyces boulardii (FLORASTOR OR) Take 1 tablet by mouth daily.         Medical History:  She  has a past medical history of Allergic rhinitis due to pollen (05/07/2012), Diverticulosis of colon (without mention of hemorrhage) (12/15/2008), Esophageal reflux, Essential hypertension, benign (11/05/2007), Family history of breast cancer (01/06/2014), Family history of breast cancer in mother (01/06/2014), High blood pressure, High cholesterol, Obesity, unspecified (11/05/2007), PONV (postoperative nausea and vomiting), Proteinuria (03/11/2011), and Type II or unspecified type diabetes mellitus without mention of complication, not stated as uncontrolled (05/09/2011).  Surgical History:  She  has a past surgical history that includes other; hysterectomy (1994); other; leg/ankle surgery proc unlisted (1996); Breast Surgery (1977); other surgical history (2012); colonoscopy (11/14); appendectomy; reduction left (1976); reduction right (1976); hand/finger surgery unlisted (Left, 02/2018); knee surgery (Left, 04/19/2019); Colectomy (12/2014); and cholecystectomy (04/16/2022).   Family History:  Her family history includes Breast Cancer (age of onset: 70) in her maternal grandmother and mother; Diabetes in her father; Eye Problems in her mother; High Blood Pressure in her father and mother.  Social History:  She  reports that she has never smoked. She has never used smokeless tobacco. She reports that she does not currently use drugs after having used the following drugs: Other-see comments. She reports that she does not drink alcohol.    Tobacco:  She has never smoked tobacco.    CAGE Alcohol Screen:   CAGE screening score of 0 on  8/27/2024, showing low risk of alcohol abuse.      Patient Care Team:  Delgado Ravi MD as PCP - General (Family Medicine)    Review of Systems  GENERAL: feels well otherwise  SKIN: denies any unusual skin lesions  EYES: denies blurred vision or double vision  HEENT: denies nasal congestion, sinus pain or ST  LUNGS: denies shortness of breath with exertion  CARDIOVASCULAR: denies chest pain on exertion  GI: denies abdominal pain, denies heartburn  Controlled  Has loose stools not always better with the Questran   MUSCULOSKELETAL: denies back pain  NEURO: denies headaches  PSYCHE: denies depression or anxiety  HEMATOLOGIC: denies hx of anemia  ENDOCRINE: denies thyroid history  ALL/ASTHMA: denies hx of allergy or asthma    Objective:   Physical Exam  General Appearance:  Alert, cooperative, no distress, appears stated age   Head:  Normocephalic, without obvious abnormality, atraumatic   Eyes:  PERRL, conjunctiva/corneas clear, EOM's intact both eyes   Back:   Symmetric, no curvature, ROM normal, no CVA tenderness   Lungs:   Clear to auscultation bilaterally, respirations unlabored   Heart:  Regular rate and rhythm, S1 and S2 normal, no murmur, rub, or gallop   Abdomen:   Soft, non-tender, bowel sounds active all four quadrants,  no masses, no organomegaly   Pelvic: Deferred   Extremities: Extremities normal, atraumatic, no cyanosis or edema  Bilateral barefoot skin diabetic exam is normal, visualized feet and the appearance is normal.  Bilateral monofilament/sensation of both feet is normal.  Pulsation pedal pulse exam of both lower legs/feet is normal as well.   Pulses: 2+ and symmetric   Skin: Skin color, texture, turgor normal, no rashes or lesions   Lymph nodes: Cervical, supraclavicular, and axillary nodes normal   Neurologic: Normal       /70 (BP Location: Left arm, Patient Position: Sitting, Cuff Size: large)   Pulse 99   Temp 97.7 °F (36.5 °C) (Temporal)   Resp 12   Ht 5' 4.5\" (1.638 m)   Wt 183  lb 6 oz (83.2 kg)   SpO2 97%   BMI 30.99 kg/m²  Estimated body mass index is 30.99 kg/m² as calculated from the following:    Height as of this encounter: 5' 4.5\" (1.638 m).    Weight as of this encounter: 183 lb 6 oz (83.2 kg).    Medicare Hearing Assessment:   Hearing Screening    Time taken: 9/3/2024  1:17 PM  Entry User: Rex Rolle MA  Screening Method: Whisper Test  Whisper Test Result: Pass         Visual Acuity:   Right Eye Visual Acuity: Uncorrected Right Eye Chart Acuity: 20/20   Left Eye Visual Acuity: Uncorrected Left Eye Chart Acuity: 20/20   Both Eyes Visual Acuity: Uncorrected Both Eyes Chart Acuity: 20/20   Able To Tolerate Visual Acuity: Yes        Assessment & Plan:   Annmarie Goldman is a 64 year old female who presents for a Medicare Assessment.     1. Encounter for annual health examination (Primary)  2. Colon cancer screening  -     GASTRO - INTERNAL  3. Loose stools  Comments:  will stop metformin  Orders:  -     TSH and Free T4; Future; Expected date: 09/03/2024  4. Screening for thyroid disorder  -     TSH and Free T4; Future; Expected date: 09/03/2024  5. Mixed hyperlipidemia  -     TSH and Free T4; Future; Expected date: 09/03/2024  6. Type 2 diabetes mellitus with hyperglycemia, without long-term current use of insulin (HCC)  Assessment & Plan:  See discontinue metformin and also increase the ozempic next fill     7. Primary osteoarthritis of left knee  Assessment & Plan:   Stable    [x] chronic stable condition, noted historically, continues present status  Ambulating well  continue motion  8. Hyperlipidemia associated with type 2 diabetes mellitus (HCC)  Assessment & Plan:  Cholesterol shows Good control. Long term heart-healthy diet and lifestyle discussed and encouraged to reduce risk of cardiovascular disease.  3/7/2024: CHOLESTEROL, TOTAL 190; HDL CHOLESTEROL 43 (L); TRIGLYCERIDES 290 (H); LDL-CHOLESTEROL 106 (H)  Cholesterol Meds: atorvastatin Tabs - 40 MG   stable  Continue with current treatment plan    9. Gastroesophageal reflux disease without esophagitis  Overview:  GI Medications            Omeprazole 40 MG Oral Capsule Delayed Release    Saccharomyces boulardii (FLORASTOR OR)            Assessment & Plan:   Stable    [x] chronic stable condition, noted historically, continues present status  On the medications    10. Essential hypertension, benign  Assessment & Plan:  BP shows good control with last BP of 128/70. Continue lifestyle changes, diet, exercise and weight loss.   3/7/2024: POTASSIUM 3.5; CREATININE 0.72; EGFR 93  BP Meds: losartan-hydroCHLOROthiazide Tabs - 50-12.5 MG       11. Class 1 obesity due to excess calories without serious comorbidity with body mass index (BMI) of 30.0 to 30.9 in adult  Assessment & Plan:  Weight static  Will increase the ozempic and told her to watch diet better    She is exercising by report    12. Diverticulosis of colon  Assessment & Plan:  Status post partial colectomy   Stable    [x] chronic stable condition, noted historically, continues present status    13. S/P partial resection of colon  Assessment & Plan:   Stable    [x] chronic stable condition, noted historically, continues present status  New loose stoools    See med adjustment  Refer to GI  14. Encounter for long-term (current) use of medications  The patient indicates understanding of these issues and agrees to the plan.  See med chg  Reinforced healthy diet, lifestyle, and exercise.      No follow-ups on file.     Delgado Ravi MD, 9/3/2024     Supplementary Documentation:   General Health:  In the past six months, have you lost more than 10 pounds without trying?: 2 - No  Has your appetite been poor?: No  Type of Diet: Diabetic  How does the patient maintain a good energy level?: Daily Walks  How would you describe your daily physical activity?: Moderate  How would you describe your current health state?: Good  How do you maintain positive mental well-being?:  Social Interaction;Puzzles;Games  On a scale of 0 to 10, with 0 being no pain and 10 being severe pain, what is your pain level?: 1 - (Mild)  At any time do you feel concerned for the safety/well-being of yourself and/or your children, in your home or elsewhere?: No  Have you had any immunizations at another office such as Influenza, Hepatitis B, Tetanus, or Pneumococcal?: No    Health Maintenance   Topic Date Due    Annual Physical  Never done    Diabetes Care Foot Exam  05/07/2019    Diabetes Care Dilated Eye Exam  08/16/2021    Mammogram  12/28/2023    Diabetes Care A1C  06/07/2024    DEXA Scan  Never done    Pneumococcal Vaccine: 65+ Years (3 of 3 - PPSV23 or PCV20) 07/23/2024    COVID-19 Vaccine (7 - 2023-24 season) 09/01/2024    Colorectal Cancer Screening  11/12/2024    Influenza Vaccine (1) 10/01/2024    Diabetes Care: GFR  03/07/2025    Diabetes Care: Microalb/Creat Ratio  03/08/2025    Annual Depression Screening  Completed    Fall Risk Screening (Annual)  Completed    Zoster Vaccines  Completed

## 2024-09-13 DIAGNOSIS — N18.2 TYPE 2 DIABETES MELLITUS WITH STAGE 2 CHRONIC KIDNEY DISEASE, WITHOUT LONG-TERM CURRENT USE OF INSULIN (HCC): ICD-10-CM

## 2024-09-13 DIAGNOSIS — E11.22 TYPE 2 DIABETES MELLITUS WITH STAGE 2 CHRONIC KIDNEY DISEASE, WITHOUT LONG-TERM CURRENT USE OF INSULIN (HCC): ICD-10-CM

## 2024-09-13 DIAGNOSIS — E11.65 TYPE 2 DIABETES MELLITUS WITH HYPERGLYCEMIA, WITHOUT LONG-TERM CURRENT USE OF INSULIN (HCC): ICD-10-CM

## 2024-09-13 RX ORDER — GLIPIZIDE 10 MG/1
10 TABLET, FILM COATED, EXTENDED RELEASE ORAL 2 TIMES DAILY
Qty: 60 TABLET | Refills: 0 | Status: SHIPPED | OUTPATIENT
Start: 2024-09-13

## 2024-09-13 NOTE — TELEPHONE ENCOUNTER
OV 09/03/24  LABS 03/08/24 A1C 9.2    REFILL 06/18/24 #180    Future Appointments   Date Time Provider Department Center   9/19/2024  8:00 AM REF [facility]F [facility] Ref [facility]     A1C was due 06/07/24

## 2024-09-19 ENCOUNTER — LABORATORY ENCOUNTER (OUTPATIENT)
Dept: LAB | Age: 64
End: 2024-09-19
Attending: FAMILY MEDICINE
Payer: COMMERCIAL

## 2024-09-19 ENCOUNTER — OFFICE VISIT (OUTPATIENT)
Dept: FAMILY MEDICINE CLINIC | Facility: CLINIC | Age: 64
End: 2024-09-19
Payer: COMMERCIAL

## 2024-09-19 VITALS
HEART RATE: 103 BPM | HEIGHT: 64.5 IN | BODY MASS INDEX: 30.02 KG/M2 | TEMPERATURE: 98 F | RESPIRATION RATE: 17 BRPM | SYSTOLIC BLOOD PRESSURE: 120 MMHG | DIASTOLIC BLOOD PRESSURE: 80 MMHG | OXYGEN SATURATION: 96 % | WEIGHT: 178 LBS

## 2024-09-19 DIAGNOSIS — E11.69 HYPERLIPIDEMIA ASSOCIATED WITH TYPE 2 DIABETES MELLITUS (HCC): ICD-10-CM

## 2024-09-19 DIAGNOSIS — R19.5 LOOSE STOOLS: ICD-10-CM

## 2024-09-19 DIAGNOSIS — E11.22 TYPE 2 DIABETES MELLITUS WITH STAGE 2 CHRONIC KIDNEY DISEASE, WITHOUT LONG-TERM CURRENT USE OF INSULIN (HCC): ICD-10-CM

## 2024-09-19 DIAGNOSIS — E78.2 MIXED HYPERLIPIDEMIA: ICD-10-CM

## 2024-09-19 DIAGNOSIS — J01.00 ACUTE NON-RECURRENT MAXILLARY SINUSITIS: Primary | ICD-10-CM

## 2024-09-19 DIAGNOSIS — Z13.29 SCREENING FOR THYROID DISORDER: ICD-10-CM

## 2024-09-19 DIAGNOSIS — N18.2 TYPE 2 DIABETES MELLITUS WITH STAGE 2 CHRONIC KIDNEY DISEASE, WITHOUT LONG-TERM CURRENT USE OF INSULIN (HCC): ICD-10-CM

## 2024-09-19 DIAGNOSIS — E78.5 HYPERLIPIDEMIA ASSOCIATED WITH TYPE 2 DIABETES MELLITUS (HCC): ICD-10-CM

## 2024-09-19 LAB
ALBUMIN SERPL-MCNC: 4.9 G/DL (ref 3.2–4.8)
ALBUMIN/GLOB SERPL: 1.9 {RATIO} (ref 1–2)
ALP LIVER SERPL-CCNC: 111 U/L
ALT SERPL-CCNC: 38 U/L
ANION GAP SERPL CALC-SCNC: 12 MMOL/L (ref 0–18)
AST SERPL-CCNC: 29 U/L (ref ?–34)
BILIRUB SERPL-MCNC: 0.5 MG/DL (ref 0.2–1.1)
BUN BLD-MCNC: 15 MG/DL (ref 9–23)
CALCIUM BLD-MCNC: 10.4 MG/DL (ref 8.7–10.4)
CHLORIDE SERPL-SCNC: 100 MMOL/L (ref 98–112)
CHOLEST SERPL-MCNC: 190 MG/DL (ref ?–200)
CO2 SERPL-SCNC: 25 MMOL/L (ref 21–32)
CREAT BLD-MCNC: 0.89 MG/DL
EGFRCR SERPLBLD CKD-EPI 2021: 72 ML/MIN/1.73M2 (ref 60–?)
EST. AVERAGE GLUCOSE BLD GHB EST-MCNC: 229 MG/DL (ref 68–126)
FASTING PATIENT LIPID ANSWER: YES
FASTING STATUS PATIENT QL REPORTED: YES
GLOBULIN PLAS-MCNC: 2.6 G/DL (ref 2–3.5)
GLUCOSE BLD-MCNC: 166 MG/DL (ref 70–99)
HBA1C MFR BLD: 9.6 % (ref ?–5.7)
HDLC SERPL-MCNC: 43 MG/DL (ref 40–59)
LDLC SERPL CALC-MCNC: 95 MG/DL (ref ?–100)
NONHDLC SERPL-MCNC: 147 MG/DL (ref ?–130)
OSMOLALITY SERPL CALC.SUM OF ELEC: 289 MOSM/KG (ref 275–295)
POTASSIUM SERPL-SCNC: 3.9 MMOL/L (ref 3.5–5.1)
PROT SERPL-MCNC: 7.5 G/DL (ref 5.7–8.2)
SODIUM SERPL-SCNC: 137 MMOL/L (ref 136–145)
T4 FREE SERPL-MCNC: 1.5 NG/DL (ref 0.8–1.7)
TRIGL SERPL-MCNC: 312 MG/DL (ref 30–149)
TSI SER-ACNC: 4.33 MIU/ML (ref 0.55–4.78)
VLDLC SERPL CALC-MCNC: 52 MG/DL (ref 0–30)

## 2024-09-19 PROCEDURE — 84443 ASSAY THYROID STIM HORMONE: CPT

## 2024-09-19 PROCEDURE — 83036 HEMOGLOBIN GLYCOSYLATED A1C: CPT

## 2024-09-19 PROCEDURE — 84439 ASSAY OF FREE THYROXINE: CPT

## 2024-09-19 PROCEDURE — 99214 OFFICE O/P EST MOD 30 MIN: CPT

## 2024-09-19 PROCEDURE — 80061 LIPID PANEL: CPT

## 2024-09-19 PROCEDURE — 80053 COMPREHEN METABOLIC PANEL: CPT

## 2024-09-19 PROCEDURE — 36415 COLL VENOUS BLD VENIPUNCTURE: CPT

## 2024-09-19 RX ORDER — DOXYCYCLINE 100 MG/1
100 CAPSULE ORAL 2 TIMES DAILY
Qty: 20 CAPSULE | Refills: 0 | Status: SHIPPED | OUTPATIENT
Start: 2024-09-19

## 2024-09-19 RX ORDER — PREDNISONE 20 MG/1
20 TABLET ORAL DAILY
Qty: 5 TABLET | Refills: 0 | Status: SHIPPED | OUTPATIENT
Start: 2024-09-19 | End: 2024-09-24

## 2024-09-19 RX ORDER — FLUCONAZOLE 150 MG/1
150 TABLET ORAL ONCE
Qty: 2 TABLET | Refills: 0 | Status: SHIPPED | OUTPATIENT
Start: 2024-09-19 | End: 2024-09-19

## 2024-09-19 NOTE — PROGRESS NOTES
Annmarie Goldman is a 64 year old female.  HPI:     Patient in office for Cough, nasal congestion and headache.  Symptoms started 14 days ago.  She thought she had a cold and was taking dayquil and nightquil with some relief of symptoms but then sinus pressure got worse with nasal congestion turning green and she started taking tylenol sinus for symptoms.  She reports symptoms are continuing to get worse.       Current Outpatient Medications   Medication Sig Dispense Refill    glipiZIDE ER 10 MG Oral Tablet 24 Hr TAKE 1 TABLET BY MOUTH TWICE A DAY 60 tablet 0    losartan-hydroCHLOROthiazide 50-12.5 MG Oral Tab Take 1 tablet by mouth daily. 90 tablet 0    buPROPion 75 MG Oral Tab Take 1 tablet (75 mg total) by mouth 2 (two) times daily. 180 tablet 0    Omeprazole 40 MG Oral Capsule Delayed Release Take 1 capsule (40 mg total) by mouth daily. 90 capsule 0    semaglutide (OZEMPIC, 1 MG/DOSE,) 4 MG/3ML Subcutaneous Solution Pen-injector Inject 1 mg into the skin every 7 days. 9 mL 0    dapagliflozin (FARXIGA) 10 MG Oral Tab Take 1 tablet (10 mg total) by mouth daily. 90 tablet 0    metFORMIN  MG Oral Tablet 24 Hr TAKE 1 TABLET BY MOUTH EVERY DAY 90 tablet 0    atorvastatin 40 MG Oral Tab Take 1 tablet (40 mg total) by mouth nightly. 90 tablet 1    levocetirizine 5 MG Oral Tab Take 1 tablet (5 mg total) by mouth every evening.      Saccharomyces boulardii (FLORASTOR OR) Take 1 tablet by mouth daily.          Past Medical History:    Allergic rhinitis due to pollen    Diverticulosis of colon (without mention of hemorrhage)    Esophageal reflux    Essential hypertension, benign    Family history of breast cancer    Family history of breast cancer in mother    High blood pressure    High cholesterol    Obesity, unspecified    PONV (postoperative nausea and vomiting)    Proteinuria    Type II or unspecified type diabetes mellitus without mention of complication, not stated as uncontrolled      Social History:  Social  History     Socioeconomic History    Marital status:     Number of children: 2   Occupational History    Occupation:    Tobacco Use    Smoking status: Never    Smokeless tobacco: Never   Vaping Use    Vaping status: Never Used   Substance and Sexual Activity    Alcohol use: No     Alcohol/week: 0.0 standard drinks of alcohol    Drug use: Not Currently     Types: Other-see comments     Comment: CBD gummies at night for arthritis    Sexual activity: Yes     Partners: Male     Birth control/protection: Hysterectomy     Comment: ovaries intact     Social Determinants of Health     Food Insecurity: No Food Insecurity (6/29/2023)    [facility]    Food Insecurity     Currently or in the past 3 months, have you worried your food would run out before you had money to buy more?: No     In the past 12 months, have you run out of food or been unable to get more?: No    [facility], [facility]    Housing Stability          REVIEW OF SYSTEMS:     Review of Systems   Constitutional:  Negative for activity change, appetite change and fatigue.   HENT:  Positive for congestion, sinus pressure and sinus pain. Negative for hearing loss and sore throat.    Eyes:  Negative for discharge and redness.   Respiratory:  Positive for cough. Negative for shortness of breath and wheezing.    Cardiovascular:  Negative for chest pain.   Gastrointestinal:  Negative for abdominal distention and abdominal pain.   Endocrine: Negative for cold intolerance and heat intolerance.   Genitourinary:  Negative for difficulty urinating and urgency.   Musculoskeletal:  Negative for arthralgias and back pain.   Skin:  Negative for color change.   Allergic/Immunologic: Negative for environmental allergies.   Neurological:  Positive for headaches. Negative for dizziness and syncope.   Hematological:  Negative for adenopathy. Does  not bruise/bleed easily.   Psychiatric/Behavioral:  Negative for agitation, behavioral problems and confusion.        EXAM:   /80   Pulse 103   Temp 98.2 °F (36.8 °C) (Temporal)   Resp 17   Ht 5' 4.5\" (1.638 m)   Wt 178 lb (80.7 kg)   SpO2 96%   BMI 30.08 kg/m²     Physical Exam  Constitutional:       Appearance: Normal appearance.   HENT:      Head: Normocephalic and atraumatic.      Nose: Congestion and rhinorrhea present.      Right Sinus: Maxillary sinus tenderness and frontal sinus tenderness present.      Left Sinus: Maxillary sinus tenderness and frontal sinus tenderness present.      Mouth/Throat:      Mouth: Mucous membranes are moist.      Pharynx: Oropharynx is clear. Posterior oropharyngeal erythema present.   Eyes:      Extraocular Movements: Extraocular movements intact.      Conjunctiva/sclera: Conjunctivae normal.      Pupils: Pupils are equal, round, and reactive to light.   Cardiovascular:      Rate and Rhythm: Normal rate and regular rhythm.      Pulses: Normal pulses.      Heart sounds: Normal heart sounds.   Pulmonary:      Effort: Pulmonary effort is normal.      Breath sounds: Normal breath sounds.   Musculoskeletal:         General: Normal range of motion.      Cervical back: Normal range of motion.   Skin:     General: Skin is warm and dry.      Capillary Refill: Capillary refill takes less than 2 seconds.   Neurological:      Mental Status: She is alert and oriented to person, place, and time.   Psychiatric:         Mood and Affect: Mood normal.         Behavior: Behavior normal.          ASSESSMENT AND PLAN:     1. Acute non-recurrent maxillary sinusitis  Doxycycline abdomen prednisone sen to pharmacy and instructions provided   - predniSONE 20 MG Oral Tab; Take 1 tablet (20 mg total) by mouth daily for 5 days.  Dispense: 5 tablet; Refill: 0  - Doxycycline Monohydrate 100 MG Oral Cap; Take 1 capsule (100 mg total) by mouth 2 (two) times daily.  Dispense: 20 capsule; Refill:  0    The patient indicates understanding of these issues and agrees to the plan.      Julieth Russ, APRN  9/19/2024

## 2024-09-23 DIAGNOSIS — E11.22 TYPE 2 DIABETES MELLITUS WITH DIABETIC CHRONIC KIDNEY DISEASE (HCC): ICD-10-CM

## 2024-09-23 RX ORDER — SEMAGLUTIDE 1.34 MG/ML
2 INJECTION, SOLUTION SUBCUTANEOUS
Qty: 6 ML | Refills: 1 | Status: SHIPPED | OUTPATIENT
Start: 2024-09-23 | End: 2024-11-12

## 2024-09-23 NOTE — TELEPHONE ENCOUNTER
See intake message     LOV  9-19-24  APRN   9-3-24 Dr Ravi    LAST LAB  9-19-24 HgbA1c 9.6                      3-7-24 Microalbumin                      9-19-24 Chem Profile gfr 72    LAST RX  7-2-24  4mg/3ml  9 ml    Next OV  No future appointments.    PROTOCOL    Diabetes Medication Protocol [id number]/23/2024 12:14 PM   Protocol Details Last A1C < 7.5 and within past 6 months    In person appointment or virtual visit in the past 6 mos or appointment in next 3 mos    Microalbumin procedure in past 12 months or taking ACE/ARB    EGFRCR or GFRNAA > 50    GFR in the past 12 months          Increase to 2 mg? Patient would like 90 day supply

## 2024-09-23 NOTE — TELEPHONE ENCOUNTER
Patient needs a new script sent to pharmacy for ozempic.  She said dr was going to increased it to 2 mg.  [facility]

## 2024-09-24 DIAGNOSIS — E11.22 TYPE 2 DIABETES MELLITUS WITH STAGE 2 CHRONIC KIDNEY DISEASE, WITHOUT LONG-TERM CURRENT USE OF INSULIN (HCC): ICD-10-CM

## 2024-09-24 DIAGNOSIS — I10 ESSENTIAL HYPERTENSION, BENIGN: ICD-10-CM

## 2024-09-24 DIAGNOSIS — K21.9 GASTROESOPHAGEAL REFLUX DISEASE WITHOUT ESOPHAGITIS: ICD-10-CM

## 2024-09-24 DIAGNOSIS — E11.65 TYPE 2 DIABETES MELLITUS WITH HYPERGLYCEMIA, WITHOUT LONG-TERM CURRENT USE OF INSULIN (HCC): ICD-10-CM

## 2024-09-24 DIAGNOSIS — N18.2 TYPE 2 DIABETES MELLITUS WITH STAGE 2 CHRONIC KIDNEY DISEASE, WITHOUT LONG-TERM CURRENT USE OF INSULIN (HCC): ICD-10-CM

## 2024-09-24 RX ORDER — DAPAGLIFLOZIN 10 MG/1
10 TABLET, FILM COATED ORAL DAILY
Qty: 90 TABLET | Refills: 0 | Status: SHIPPED | OUTPATIENT
Start: 2024-09-24

## 2024-09-24 RX ORDER — LOSARTAN POTASSIUM AND HYDROCHLOROTHIAZIDE 12.5; 5 MG/1; MG/1
1 TABLET ORAL DAILY
Qty: 90 TABLET | Refills: 0 | Status: SHIPPED | OUTPATIENT
Start: 2024-09-24 | End: 2025-03-23

## 2024-09-24 RX ORDER — OMEPRAZOLE 40 MG/1
40 CAPSULE, DELAYED RELEASE ORAL DAILY
Qty: 90 CAPSULE | Refills: 0 | Status: SHIPPED | OUTPATIENT
Start: 2024-09-24

## 2024-09-24 NOTE — TELEPHONE ENCOUNTER
Losartan   Last refill: 06/27/24  qtY: 90  W/ 0 refills  Last ov 09/03/24  Last labs 09/19/24    Omeprazole  Last refill: 06/27/24  qtY: 90  W/ 0 refills  Last ov: 09/03/24    Requested Prescriptions     Pending Prescriptions Disp Refills    losartan-hydroCHLOROthiazide 50-12.5 MG Oral Tab 90 tablet 0     Sig: Take 1 tablet by mouth daily.    Omeprazole 40 MG Oral Capsule Delayed Release 90 capsule 0     Sig: Take 1 capsule (40 mg total) by mouth daily.     No future appointments.

## 2024-09-24 NOTE — TELEPHONE ENCOUNTER
dapagliflozin (FARXIGA) 10 MG Oral Tab     Diabetes Medication Protocol Failed09/24/2024 02:49 PM   Protocol Details Last A1C < 7.5 and within past 6 months    In person appointment or virtual visit in the past 6 mos or appointment in next 3 mos    Microalbumin procedure in past 12 months or taking ACE/ARB    EGFRCR or GFRNAA > 50    GFR in the past 12 months      Last office visit:  9/3/24  No future appointments.  Last filled:  6/19/24  #90  Last labs:  9/19/24  A1C: 9.6  eGFR-Cr: 72 3/7/24 mirco/alb done

## 2024-09-25 ENCOUNTER — TELEPHONE (OUTPATIENT)
Dept: FAMILY MEDICINE CLINIC | Facility: CLINIC | Age: 64
End: 2024-09-25

## 2024-09-25 DIAGNOSIS — N18.2 TYPE 2 DIABETES MELLITUS WITH STAGE 2 CHRONIC KIDNEY DISEASE, WITHOUT LONG-TERM CURRENT USE OF INSULIN (HCC): Primary | ICD-10-CM

## 2024-09-25 DIAGNOSIS — E11.65 TYPE 2 DIABETES MELLITUS WITH HYPERGLYCEMIA, WITHOUT LONG-TERM CURRENT USE OF INSULIN (HCC): Primary | ICD-10-CM

## 2024-09-25 DIAGNOSIS — E78.5 HYPERLIPIDEMIA ASSOCIATED WITH TYPE 2 DIABETES MELLITUS (HCC): ICD-10-CM

## 2024-09-25 DIAGNOSIS — E11.22 TYPE 2 DIABETES MELLITUS WITH DIABETIC CHRONIC KIDNEY DISEASE (HCC): ICD-10-CM

## 2024-09-25 DIAGNOSIS — E11.22 TYPE 2 DIABETES MELLITUS WITH STAGE 2 CHRONIC KIDNEY DISEASE, WITHOUT LONG-TERM CURRENT USE OF INSULIN (HCC): Primary | ICD-10-CM

## 2024-09-25 DIAGNOSIS — E78.2 MIXED HYPERLIPIDEMIA: ICD-10-CM

## 2024-09-25 DIAGNOSIS — Z79.899 ENCOUNTER FOR LONG-TERM (CURRENT) USE OF MEDICATIONS: ICD-10-CM

## 2024-09-25 DIAGNOSIS — Z13.29 SCREENING FOR THYROID DISORDER: ICD-10-CM

## 2024-09-25 DIAGNOSIS — E11.65 TYPE 2 DIABETES MELLITUS WITH HYPERGLYCEMIA, WITHOUT LONG-TERM CURRENT USE OF INSULIN (HCC): ICD-10-CM

## 2024-09-25 DIAGNOSIS — E11.69 HYPERLIPIDEMIA ASSOCIATED WITH TYPE 2 DIABETES MELLITUS (HCC): ICD-10-CM

## 2024-09-25 RX ORDER — SEMAGLUTIDE 1.34 MG/ML
2 INJECTION, SOLUTION SUBCUTANEOUS
Qty: 6 ML | Refills: 1 | Status: SHIPPED | OUTPATIENT
Start: 2024-09-25 | End: 2024-09-25

## 2024-09-25 NOTE — TELEPHONE ENCOUNTER
Pharmacy called in regards to semaglutide (OZEMPIC, 1 MG/DOSE,) 4 MG/3ML Subcutaneous Solution Pen-injector, pharmacy requested 2MG DOSE PENS and they are needing the 2MG DOSE SENT.

## 2024-10-11 DIAGNOSIS — E11.65 TYPE 2 DIABETES MELLITUS WITH HYPERGLYCEMIA, WITHOUT LONG-TERM CURRENT USE OF INSULIN (HCC): ICD-10-CM

## 2024-10-11 DIAGNOSIS — E11.22 TYPE 2 DIABETES MELLITUS WITH STAGE 2 CHRONIC KIDNEY DISEASE, WITHOUT LONG-TERM CURRENT USE OF INSULIN (HCC): ICD-10-CM

## 2024-10-11 DIAGNOSIS — N18.2 TYPE 2 DIABETES MELLITUS WITH STAGE 2 CHRONIC KIDNEY DISEASE, WITHOUT LONG-TERM CURRENT USE OF INSULIN (HCC): ICD-10-CM

## 2024-10-11 RX ORDER — GLIPIZIDE 10 MG/1
10 TABLET, FILM COATED, EXTENDED RELEASE ORAL 2 TIMES DAILY
Qty: 180 TABLET | Refills: 1 | Status: SHIPPED | OUTPATIENT
Start: 2024-10-11

## 2024-10-11 NOTE — TELEPHONE ENCOUNTER
Faxed refill request on glipizide ER 10mg for 90 day supply    LOV  9-19-24  APRN   9-3-24 Dr Ravi    LAST LAB  9-19-24  HgbA1c 9.6                       3-7-24 Microalbumin                        9-19-24 Chem Profile  gfr 72    LAST RX  9-13-24  #60    Next OV  No future appointments.    PROTOCOL    Diabetes Medication Protocol Sltbhl04/11/2024 10:30 AM   Protocol Details Last A1C < 7.5 and within past 6 months    In person appointment or virtual visit in the past 6 mos or appointment in next 3 mos    Microalbumin procedure in past 12 months or taking ACE/ARB    EGFRCR or GFRNAA > 50    GFR in the past 12 months

## 2024-11-11 NOTE — TELEPHONE ENCOUNTER
Cass Teague said that patient saw Terry Calderón and has been on Glipizide bid, Farxiga 10mg and she wants patient to increase the Metformin ER to 850 daily because patient is still not controlled.  She states patient did not want to go up to 1000mg daily of the Metformin 11/11/24 1118)  Pulse: 83 (11/11/24 1505)  Resp: 16 (11/11/24 1118)  BP: (!) 132/59 (11/11/24 1118)  SpO2: 95 % (11/11/24 1118) 

## 2024-11-15 DIAGNOSIS — E11.69 HYPERLIPIDEMIA ASSOCIATED WITH TYPE 2 DIABETES MELLITUS (HCC): ICD-10-CM

## 2024-11-15 DIAGNOSIS — E78.5 HYPERLIPIDEMIA ASSOCIATED WITH TYPE 2 DIABETES MELLITUS (HCC): ICD-10-CM

## 2024-11-15 RX ORDER — ATORVASTATIN CALCIUM 40 MG/1
40 TABLET, FILM COATED ORAL NIGHTLY
Qty: 90 TABLET | Refills: 0 | Status: SHIPPED | OUTPATIENT
Start: 2024-11-15

## 2024-12-05 DIAGNOSIS — N18.2 TYPE 2 DIABETES MELLITUS WITH STAGE 2 CHRONIC KIDNEY DISEASE, WITHOUT LONG-TERM CURRENT USE OF INSULIN (HCC): ICD-10-CM

## 2024-12-05 DIAGNOSIS — E11.65 TYPE 2 DIABETES MELLITUS WITH HYPERGLYCEMIA, WITHOUT LONG-TERM CURRENT USE OF INSULIN (HCC): ICD-10-CM

## 2024-12-05 DIAGNOSIS — E11.69 HYPERLIPIDEMIA ASSOCIATED WITH TYPE 2 DIABETES MELLITUS (HCC): ICD-10-CM

## 2024-12-05 DIAGNOSIS — E11.22 TYPE 2 DIABETES MELLITUS WITH DIABETIC CHRONIC KIDNEY DISEASE (HCC): ICD-10-CM

## 2024-12-05 DIAGNOSIS — E78.5 HYPERLIPIDEMIA ASSOCIATED WITH TYPE 2 DIABETES MELLITUS (HCC): ICD-10-CM

## 2024-12-05 DIAGNOSIS — E11.22 TYPE 2 DIABETES MELLITUS WITH STAGE 2 CHRONIC KIDNEY DISEASE, WITHOUT LONG-TERM CURRENT USE OF INSULIN (HCC): ICD-10-CM

## 2024-12-06 RX ORDER — ATORVASTATIN CALCIUM 40 MG/1
40 TABLET, FILM COATED ORAL NIGHTLY
Qty: 90 TABLET | Refills: 0 | Status: SHIPPED | OUTPATIENT
Start: 2024-12-06

## 2024-12-06 RX ORDER — SEMAGLUTIDE 1.34 MG/ML
1 INJECTION, SOLUTION SUBCUTANEOUS
Qty: 3 ML | Refills: 0 | Status: SHIPPED | OUTPATIENT
Start: 2024-12-06

## 2024-12-06 RX ORDER — DAPAGLIFLOZIN 10 MG/1
10 TABLET, FILM COATED ORAL DAILY
Qty: 90 TABLET | Refills: 0 | Status: SHIPPED | OUTPATIENT
Start: 2024-12-06

## 2024-12-06 NOTE — TELEPHONE ENCOUNTER
OV 09/03/24  LABS 09/19/24    REFILL  -Atorvastatin 40 mg 11/15/24 #90 - too soon  -Ozempic 09/25/24 #1ea +12 RF discontinued per provider  -Farxiga 10 mg 09/24/24 #90 too soon    No future appointments.

## 2024-12-26 DIAGNOSIS — K21.9 GASTROESOPHAGEAL REFLUX DISEASE WITHOUT ESOPHAGITIS: ICD-10-CM

## 2024-12-26 RX ORDER — OMEPRAZOLE 40 MG/1
40 CAPSULE, DELAYED RELEASE ORAL DAILY
Qty: 90 CAPSULE | Refills: 1 | Status: SHIPPED | OUTPATIENT
Start: 2024-12-26

## 2024-12-26 NOTE — TELEPHONE ENCOUNTER
LOV  09/19/2024 with Julieth FARFAN  Last Refill  09/24/2024  No future appointments.  Name from pharmacy: OMEPRAZOLE DR 40 MG CAPSULE          Will file in chart as: OMEPRAZOLE 40 MG Oral Capsule Delayed Release    Sig: Take 1 capsule (40 mg total) by mouth daily.    Disp: 90 capsule    Refills: 0 (Pharmacy requested: Not specified)    Start: 12/26/2024    Class: Normal    Non-formulary For: Gastroesophageal reflux disease without esophagitis    Last ordered: 3 months ago (9/24/2024) by Delgado Ravi MD    Last refill: 9/24/2024    Rx #: [id number]    Gastrointestional Medication Protocol [id number]/26/2024 12:46 PM   Protocol Details In person appointment or virtual visit in the past 12 mos or appointment in next 3 mos      To be filled at: [facility] [id number] [address] [phone], [phone]

## 2024-12-30 ENCOUNTER — OFFICE VISIT (OUTPATIENT)
Dept: FAMILY MEDICINE CLINIC | Facility: CLINIC | Age: 64
End: 2024-12-30
Payer: MEDICARE

## 2024-12-30 VITALS
WEIGHT: 173 LBS | OXYGEN SATURATION: 98 % | TEMPERATURE: 98 F | BODY MASS INDEX: 27.8 KG/M2 | SYSTOLIC BLOOD PRESSURE: 126 MMHG | HEART RATE: 107 BPM | DIASTOLIC BLOOD PRESSURE: 84 MMHG | RESPIRATION RATE: 16 BRPM | HEIGHT: 66 IN

## 2024-12-30 DIAGNOSIS — R35.0 URINARY FREQUENCY: ICD-10-CM

## 2024-12-30 DIAGNOSIS — R39.9 UTI SYMPTOMS: Primary | ICD-10-CM

## 2024-12-30 LAB
BILIRUBIN: NEGATIVE
GLUCOSE (URINE DIPSTICK): 500 MG/DL
KETONES (URINE DIPSTICK): NEGATIVE MG/DL
MULTISTIX LOT#: ABNORMAL NUMERIC
NITRITE, URINE: NEGATIVE
PH, URINE: 5.5 (ref 4.5–8)
PROTEIN (URINE DIPSTICK): NEGATIVE MG/DL
SPECIFIC GRAVITY: 1.01 (ref 1–1.03)
URINE-COLOR: YELLOW
UROBILINOGEN,SEMI-QN: 0.2 MG/DL (ref 0–1.9)

## 2024-12-30 PROCEDURE — 87086 URINE CULTURE/COLONY COUNT: CPT | Performed by: NURSE PRACTITIONER

## 2024-12-30 RX ORDER — NITROFURANTOIN 25; 75 MG/1; MG/1
100 CAPSULE ORAL 2 TIMES DAILY
Qty: 10 CAPSULE | Refills: 0 | Status: SHIPPED | OUTPATIENT
Start: 2024-12-30 | End: 2025-01-04

## 2024-12-30 RX ORDER — PHENAZOPYRIDINE HYDROCHLORIDE 200 MG/1
200 TABLET, FILM COATED ORAL 3 TIMES DAILY PRN
Qty: 9 TABLET | Refills: 0 | Status: SHIPPED | OUTPATIENT
Start: 2024-12-30

## 2024-12-30 NOTE — PROGRESS NOTES
Annmarie Goldman is a 64 year old female.  HPI:   Patient presents with symptoms of UTI for 1 days.   Complaining of urinary frequency, urgency, dysuria, pos suprapubic pain,  Denies back pain, fever, hematuria.  Pt has history of UTI in past.    Current Outpatient Medications   Medication Sig Dispense Refill    OMEPRAZOLE 40 MG Oral Capsule Delayed Release TAKE 1 CAPSULE (40 MG TOTAL) BY MOUTH DAILY. 90 capsule 1    ATORVASTATIN 40 MG Oral Tab TAKE 1 TABLET BY MOUTH EVERY DAY AT NIGHT 90 tablet 0    semaglutide (OZEMPIC, 1 MG/DOSE,) 4 MG/3ML Subcutaneous Solution Pen-injector INJECT 1 MG INTO THE SKIN EVERY 7 DAYS 3 mL 0    FARXIGA 10 MG Oral Tab TAKE 1 TABLET BY MOUTH EVERY DAY 90 tablet 0    glipiZIDE ER 10 MG Oral Tablet 24 Hr Take 1 tablet (10 mg total) by mouth 2 (two) times daily. 180 tablet 1    semaglutide 8 MG/3ML Subcutaneous Solution Pen-injector Inject 2 mg into the skin once a week. 1 each 12    losartan-hydroCHLOROthiazide 50-12.5 MG Oral Tab Take 1 tablet by mouth daily. 90 tablet 0    Doxycycline Monohydrate 100 MG Oral Cap Take 1 capsule (100 mg total) by mouth 2 (two) times daily. 20 capsule 0    buPROPion 75 MG Oral Tab Take 1 tablet (75 mg total) by mouth 2 (two) times daily. 180 tablet 0    metFORMIN  MG Oral Tablet 24 Hr TAKE 1 TABLET BY MOUTH EVERY DAY 90 tablet 0    levocetirizine 5 MG Oral Tab Take 1 tablet (5 mg total) by mouth every evening.      Saccharomyces boulardii (FLORASTOR OR) Take 1 tablet by mouth daily.         No current facility-administered medications for this visit.      Past Medical History:    Allergic rhinitis due to pollen    Diverticulosis of colon (without mention of hemorrhage)    Esophageal reflux    Essential hypertension, benign    Family history of breast cancer    Family history of breast cancer in mother    High blood pressure    High cholesterol    Obesity, unspecified    PONV (postoperative nausea and vomiting)    Proteinuria    Type II or  unspecified type diabetes mellitus without mention of complication, not stated as uncontrolled      Social History:  Social History     Socioeconomic History    Marital status:     Number of children: 2   Occupational History    Occupation:    Tobacco Use    Smoking status: Never    Smokeless tobacco: Never   Vaping Use    Vaping status: Never Used   Substance and Sexual Activity    Alcohol use: No     Alcohol/week: 0.0 standard drinks of alcohol    Drug use: Not Currently     Types: Other-see comments     Comment: CBD gummies at night for arthritis    Sexual activity: Yes     Partners: Male     Birth control/protection: Hysterectomy     Comment: ovaries intact     Social Drivers of Health     Food Insecurity: No Food Insecurity (6/29/2023)    [facility], [facility]    Food Insecurity     Currently or in the past 3 months, have you worried your food would run out before you had money to buy more?: No     In the past 12 months, have you run out of food or been unable to get more?: No    Received from [facility], [facility]    Housing Stability         REVIEW OF SYSTEMS:   GENERAL HEALTH: no  fever/chills or fatigue  SKIN: denies any unusual skin lesions or rashes  RESPIRATORY: no shortness of breath with exertion  CARDIOVASCULAR: denies chest pain on exertion and palpitations.  GI: no nausea or vomiting  : as above.  NEURO: denies headaches or dizziness.    EXAM:   /84   Pulse 107   Temp 97.6 °F (36.4 °C)   Resp 16   Ht 5' 6\" (1.676 m)   Wt 173 lb (78.5 kg)   SpO2 98%   BMI 27.92 kg/m²   GENERAL: well developed, well nourished,in no apparent distress, pleasant pt.  SKIN: no rashes,no suspicious lesions  LUNG: Clear to auscultation bilaterally. No W/R/R.  HEART: RRR, no murmur.  GI: normoactive BS x4, no masses, HSM; no suprapubic tenderness, no CVAT    RESULTS:      Recent Results (from the  past 24 hours)   URINALYSIS, AUTO, W/O SCOPE    Collection Time: 12/30/24  5:46 PM   Result Value Ref Range    Glucose Urine 500 (A) Negative mg/dL    Bilirubin Urine Negative Negative    Ketones, UA Negative Negative - Trace mg/dL    Spec Gravity 1.010 1.005 - 1.030    Blood Urine Trace-intact (A) Negative    PH Urine 5.5 5.0 - 8.0    Protein Urine Negative Negative - Trace mg/dL    Urobilinogen Urine 0.2 0.2 - 1.0 mg/dL    Nitrite Urine Negative Negative    Leukocyte Esterase Urine Trace (A) Negative    APPEARANCE cloudy Clear    Color Urine yellow Yellow    Multistix Lot# [id number] Numeric    Multistix Expiration Date 5/31/25 Date       ASSESSMENT AND PLAN:   UTI     Educated on medication  Educated on sending a culture  Educated on supportive measures: ibuprofen/tylenol, hydration,   Educated on s/s of worsening sx and when to seek higher level of care  Follow up with pcp if not improving    There are no Patient Instructions on file for this visit.  The patient indicates understanding of these issues and agrees to the plan.

## 2025-01-02 ENCOUNTER — TELEPHONE (OUTPATIENT)
Dept: FAMILY MEDICINE CLINIC | Facility: CLINIC | Age: 65
End: 2025-01-02

## 2025-01-02 DIAGNOSIS — R39.9 UTI SYMPTOMS: ICD-10-CM

## 2025-01-02 DIAGNOSIS — R35.0 URINARY FREQUENCY: ICD-10-CM

## 2025-01-02 RX ORDER — PHENAZOPYRIDINE HYDROCHLORIDE 200 MG/1
200 TABLET, FILM COATED ORAL 3 TIMES DAILY PRN
Qty: 9 TABLET | Refills: 0 | Status: SHIPPED | OUTPATIENT
Start: 2025-01-02

## 2025-01-02 RX ORDER — NITROFURANTOIN 25; 75 MG/1; MG/1
100 CAPSULE ORAL 2 TIMES DAILY
Qty: 10 CAPSULE | Refills: 0 | Status: SHIPPED | OUTPATIENT
Start: 2025-01-02 | End: 2025-01-07

## 2025-01-02 NOTE — TELEPHONE ENCOUNTER
Patient went to urgent care on Monday for UTI.  She was prescribed medication & was feeling better.  This morning the pressure is back.  She wanted to discuss.

## 2025-01-02 NOTE — TELEPHONE ENCOUNTER
Since this was working  I sent a refill of the same to [facility]

## 2025-01-02 NOTE — TELEPHONE ENCOUNTER
Patient seen at urgent care on 12-30-24 dx with UTI and given nitrofurantoin and phenazopyridine, last dose of antibiotic will be tomorrow. Was feeling better until this morning started with urinary pressure and frequency again.    See urine culture results, please advise

## 2025-03-18 DIAGNOSIS — E11.22 TYPE 2 DIABETES MELLITUS WITH STAGE 2 CHRONIC KIDNEY DISEASE, WITHOUT LONG-TERM CURRENT USE OF INSULIN (HCC): ICD-10-CM

## 2025-03-18 DIAGNOSIS — I10 ESSENTIAL HYPERTENSION, BENIGN: ICD-10-CM

## 2025-03-18 DIAGNOSIS — E11.65 TYPE 2 DIABETES MELLITUS WITH HYPERGLYCEMIA, WITHOUT LONG-TERM CURRENT USE OF INSULIN (HCC): ICD-10-CM

## 2025-03-18 DIAGNOSIS — N18.2 TYPE 2 DIABETES MELLITUS WITH STAGE 2 CHRONIC KIDNEY DISEASE, WITHOUT LONG-TERM CURRENT USE OF INSULIN (HCC): ICD-10-CM

## 2025-03-19 RX ORDER — LOSARTAN POTASSIUM AND HYDROCHLOROTHIAZIDE 12.5; 5 MG/1; MG/1
1 TABLET ORAL DAILY
Qty: 90 TABLET | Refills: 0 | Status: SHIPPED | OUTPATIENT
Start: 2025-03-19

## 2025-03-19 RX ORDER — DAPAGLIFLOZIN 10 MG/1
10 TABLET, FILM COATED ORAL DAILY
Qty: 90 TABLET | Refills: 0 | Status: SHIPPED | OUTPATIENT
Start: 2025-03-19

## 2025-03-19 NOTE — TELEPHONE ENCOUNTER
Requested Prescriptions     Pending Prescriptions Disp Refills    LOSARTAN-HYDROCHLOROTHIAZIDE 50-12.5 MG Oral Tab [Pharmacy Med Name: LOSARTAN-HCTZ 50-12.5 MG TAB] 90 tablet 0     Sig: TAKE 1 TABLET BY MOUTH EVERY DAY    FARXIGA 10 MG Oral Tab [Pharmacy Med Name: FARXIGA 10 MG TABLET] 90 tablet 0     Sig: TAKE 1 TABLET BY MOUTH EVERY DAY     Last refill on farxiga 12/6/24 #90  Last refill losartan/hydrochlorothiazide 9/24/24 #90  LOV 9/3/24  No future appointments.  Last labs 9/19/24 - patient is due for fasting labs - reminder letter sent.

## 2025-04-01 DIAGNOSIS — E11.69 HYPERLIPIDEMIA ASSOCIATED WITH TYPE 2 DIABETES MELLITUS (HCC): ICD-10-CM

## 2025-04-01 DIAGNOSIS — E78.5 HYPERLIPIDEMIA ASSOCIATED WITH TYPE 2 DIABETES MELLITUS (HCC): ICD-10-CM

## 2025-04-02 RX ORDER — ATORVASTATIN CALCIUM 40 MG/1
40 TABLET, FILM COATED ORAL NIGHTLY
Qty: 90 TABLET | Refills: 0 | Status: SHIPPED | OUTPATIENT
Start: 2025-04-02

## 2025-04-02 NOTE — TELEPHONE ENCOUNTER
LOV:9-    LAST LAB:9-    LAST RX:  Medication Quantity Refills Start End   ATORVASTATIN 40 MG Oral Tab 90 tablet 0 12/6/2024 --   Sig:   TAKE 1 TABLET BY MOUTH EVERY DAY AT NIGHT         Next OV:   Future Appointments   Date Time Provider Department Center   4/4/2025  8:40 AM Delgado Ravi MD [facility] [place]   4/4/2025  9:00 AM REF [facility] REF [facility] Ref [facility]          PROTOCOL    Cholesterol Medication Protocol Mprovy5404/01/2025 06:20 PM   Protocol Details ALT < 80    ALT resulted within past year    Lipid panel within past 12 months    In person appointment or virtual visit in the past 12 mos or appointment in next 3 mos    Medication is active on med list

## 2025-04-04 ENCOUNTER — LABORATORY ENCOUNTER (OUTPATIENT)
Dept: LAB | Age: 65
End: 2025-04-04
Attending: FAMILY MEDICINE
Payer: COMMERCIAL

## 2025-04-04 ENCOUNTER — OFFICE VISIT (OUTPATIENT)
Dept: FAMILY MEDICINE CLINIC | Facility: CLINIC | Age: 65
End: 2025-04-04
Payer: MEDICARE

## 2025-04-04 VITALS
HEART RATE: 92 BPM | BODY MASS INDEX: 29 KG/M2 | RESPIRATION RATE: 18 BRPM | DIASTOLIC BLOOD PRESSURE: 72 MMHG | WEIGHT: 179 LBS | TEMPERATURE: 98 F | SYSTOLIC BLOOD PRESSURE: 118 MMHG

## 2025-04-04 DIAGNOSIS — E78.5 HYPERLIPIDEMIA ASSOCIATED WITH TYPE 2 DIABETES MELLITUS (HCC): ICD-10-CM

## 2025-04-04 DIAGNOSIS — I10 ESSENTIAL HYPERTENSION, BENIGN: ICD-10-CM

## 2025-04-04 DIAGNOSIS — E11.65 TYPE 2 DIABETES MELLITUS WITH HYPERGLYCEMIA, WITHOUT LONG-TERM CURRENT USE OF INSULIN (HCC): ICD-10-CM

## 2025-04-04 DIAGNOSIS — Z13.29 SCREENING FOR THYROID DISORDER: ICD-10-CM

## 2025-04-04 DIAGNOSIS — Z79.899 ENCOUNTER FOR LONG-TERM (CURRENT) USE OF MEDICATIONS: Primary | ICD-10-CM

## 2025-04-04 DIAGNOSIS — Z79.899 ENCOUNTER FOR LONG-TERM (CURRENT) USE OF MEDICATIONS: ICD-10-CM

## 2025-04-04 DIAGNOSIS — E78.2 MIXED HYPERLIPIDEMIA: ICD-10-CM

## 2025-04-04 DIAGNOSIS — M17.11 PRIMARY OSTEOARTHRITIS OF RIGHT KNEE: ICD-10-CM

## 2025-04-04 DIAGNOSIS — N18.2 TYPE 2 DIABETES MELLITUS WITH STAGE 2 CHRONIC KIDNEY DISEASE, WITHOUT LONG-TERM CURRENT USE OF INSULIN (HCC): ICD-10-CM

## 2025-04-04 DIAGNOSIS — E11.69 HYPERLIPIDEMIA ASSOCIATED WITH TYPE 2 DIABETES MELLITUS (HCC): ICD-10-CM

## 2025-04-04 DIAGNOSIS — E11.22 TYPE 2 DIABETES MELLITUS WITH STAGE 2 CHRONIC KIDNEY DISEASE, WITHOUT LONG-TERM CURRENT USE OF INSULIN (HCC): ICD-10-CM

## 2025-04-04 DIAGNOSIS — K21.9 GASTROESOPHAGEAL REFLUX DISEASE WITHOUT ESOPHAGITIS: ICD-10-CM

## 2025-04-04 LAB
ALBUMIN SERPL-MCNC: 4.9 G/DL (ref 3.2–4.8)
ALBUMIN/GLOB SERPL: 2.1 {RATIO} (ref 1–2)
ALP LIVER SERPL-CCNC: 112 U/L
ALT SERPL-CCNC: 28 U/L
ANION GAP SERPL CALC-SCNC: 8 MMOL/L (ref 0–18)
AST SERPL-CCNC: 22 U/L (ref ?–34)
BILIRUB SERPL-MCNC: 0.7 MG/DL (ref 0.2–1.1)
BUN BLD-MCNC: 18 MG/DL (ref 9–23)
CALCIUM BLD-MCNC: 10.6 MG/DL (ref 8.7–10.6)
CHLORIDE SERPL-SCNC: 98 MMOL/L (ref 98–112)
CHOLEST SERPL-MCNC: 198 MG/DL (ref ?–200)
CO2 SERPL-SCNC: 28 MMOL/L (ref 21–32)
CREAT BLD-MCNC: 0.94 MG/DL
EGFRCR SERPLBLD CKD-EPI 2021: 67 ML/MIN/1.73M2 (ref 60–?)
EST. AVERAGE GLUCOSE BLD GHB EST-MCNC: 249 MG/DL (ref 68–126)
FASTING PATIENT LIPID ANSWER: YES
FASTING STATUS PATIENT QL REPORTED: YES
GLOBULIN PLAS-MCNC: 2.3 G/DL (ref 2–3.5)
GLUCOSE BLD-MCNC: 211 MG/DL (ref 70–99)
HBA1C MFR BLD: 10.3 % (ref ?–5.7)
HDLC SERPL-MCNC: 49 MG/DL (ref 40–59)
LDLC SERPL CALC-MCNC: 106 MG/DL (ref ?–100)
NONHDLC SERPL-MCNC: 149 MG/DL (ref ?–130)
OSMOLALITY SERPL CALC.SUM OF ELEC: 286 MOSM/KG (ref 275–295)
POTASSIUM SERPL-SCNC: 3.7 MMOL/L (ref 3.5–5.1)
PROT SERPL-MCNC: 7.2 G/DL (ref 5.7–8.2)
SODIUM SERPL-SCNC: 134 MMOL/L (ref 136–145)
T4 FREE SERPL-MCNC: 1.4 NG/DL (ref 0.8–1.7)
TRIGL SERPL-MCNC: 254 MG/DL (ref 30–149)
TSI SER-ACNC: 2.41 UIU/ML (ref 0.55–4.78)
VLDLC SERPL CALC-MCNC: 43 MG/DL (ref 0–30)

## 2025-04-04 PROCEDURE — 80061 LIPID PANEL: CPT

## 2025-04-04 PROCEDURE — G2211 COMPLEX E/M VISIT ADD ON: HCPCS | Performed by: FAMILY MEDICINE

## 2025-04-04 PROCEDURE — 80053 COMPREHEN METABOLIC PANEL: CPT

## 2025-04-04 PROCEDURE — 36415 COLL VENOUS BLD VENIPUNCTURE: CPT

## 2025-04-04 PROCEDURE — 84443 ASSAY THYROID STIM HORMONE: CPT

## 2025-04-04 PROCEDURE — 84439 ASSAY OF FREE THYROXINE: CPT

## 2025-04-04 PROCEDURE — 99214 OFFICE O/P EST MOD 30 MIN: CPT | Performed by: FAMILY MEDICINE

## 2025-04-04 PROCEDURE — 83036 HEMOGLOBIN GLYCOSYLATED A1C: CPT

## 2025-04-04 NOTE — ASSESSMENT & PLAN NOTE
Stable    [x] chronic stable condition, noted historically, continues present status on omeprazole

## 2025-04-04 NOTE — PROGRESS NOTES
Subjective:   Annmarie Goldman is a 64 year old female who presents for Medication Follow-Up       History/Other:   History of Present Illness  Annmarie Goldman is a 64 year old female who presents with knee pain and follow-up for hand surgery.    She is experiencing knee pain described as 'bone on bone' and is considering further evaluation at the end of the month. She has been receiving gel injections for temporary relief, which last about three months. She is not ready for knee replacement surgery due to her active lifestyle and concerns about recovery time. She is seeking a younger doctor's opinion on alternative treatments.    She underwent hand surgery for trigger finger about a month ago, which has healed well. She previously had surgery on the top of her hand for a lump a few years ago. She declined therapy post-surgery and reports that her hand is now fully functional.    She has been switched from metformin to semaglutide 2 mg, which has improved her energy levels and reduced gastrointestinal side effects, although she occasionally experiences nausea and stomachache. She does not regularly check her blood sugar levels.    She feels tired and exhausted due to her busy schedule, which includes caring for her grandchildren and attending to family matters. She has difficulty sleeping, often waking up at night to use the bathroom, and sometimes struggles to return to sleep due to her 's snoring.    She takes levocetirizine and Florastor, although she did not take Florastor recently. She also takes bupropion, which she feels helps stabilize her mood, though she has considered increasing the dose due to recent stressors.    No pain or tingling in her extremities and she feels everything well. She does not feel down or depressed, attributing her tiredness to her busy lifestyle.   Chief Complaint Reviewed and Verified  No Further Nursing Notes to   Review  Tobacco Reviewed  Allergies Reviewed   Medications Reviewed    Problem List Reviewed  Medical History Reviewed  Surgical History   Reviewed  Family History Reviewed  Social History Reviewed         Tobacco:  She has never smoked tobacco.    Current Outpatient Medications   Medication Sig Dispense Refill    ATORVASTATIN 40 MG Oral Tab TAKE 1 TABLET BY MOUTH EVERY DAY AT NIGHT 90 tablet 0    LOSARTAN-HYDROCHLOROTHIAZIDE 50-12.5 MG Oral Tab TAKE 1 TABLET BY MOUTH EVERY DAY 90 tablet 0    FARXIGA 10 MG Oral Tab TAKE 1 TABLET BY MOUTH EVERY DAY 90 tablet 0    OMEPRAZOLE 40 MG Oral Capsule Delayed Release TAKE 1 CAPSULE (40 MG TOTAL) BY MOUTH DAILY. 90 capsule 1    glipiZIDE ER 10 MG Oral Tablet 24 Hr Take 1 tablet (10 mg total) by mouth 2 (two) times daily. 180 tablet 1    semaglutide 8 MG/3ML Subcutaneous Solution Pen-injector Inject 2 mg into the skin once a week. 1 each 12    buPROPion 75 MG Oral Tab Take 1 tablet (75 mg total) by mouth 2 (two) times daily. 180 tablet 0    levocetirizine 5 MG Oral Tab Take 1 tablet (5 mg total) by mouth every evening.      Saccharomyces boulardii (FLORASTOR OR) Take 1 tablet by mouth daily.           PHQ-2 SCORE: 0  , done 4/4/2025          Review of Systems:  Pertinent items are noted in HPI.      Objective:   /72 (BP Location: Left arm, Patient Position: Sitting, Cuff Size: large)   Pulse 92   Temp 97.7 °F (36.5 °C) (Temporal)   Resp 18   Wt 179 lb (81.2 kg)   BMI 28.89 kg/m²  Estimated body mass index is 28.89 kg/m² as calculated from the following:    Height as of 12/30/24: 5' 6\" (1.676 m).    Weight as of this encounter: 179 lb (81.2 kg).  Results  RADIOLOGY  Mammogram: Normal breast density and no abnormal findings (03/04/2025)     Physical Exam  CHEST: Clear to auscultation bilaterally. No wheezes, rhonchi, or crackles.  Bilateral barefoot skin diabetic exam is normal, visualized feet and the appearance is normal.  Bilateral monofilament/sensation of both feet is normal.  Pulsation pedal pulse exam  of both lower legs/feet is normal as well.    Right knee tender  walks with no aid  Healing palm left from trigger finger surgery  CV rrr no murmur  SKIN No pallor jaundice  No icterus  No diaphoresis  warm and dry      Assessment & Plan:   Assessment & Plan  Encounter for long-term (current) use of medications         Essential hypertension, benign  BP shows good control with last BP of 118/72. Continue lifestyle changes, diet, exercise and weight loss.   9/19/2024: Potassium 3.9; Creatinine 0.89; eGFR-Cr 72  BP Meds: losartan-hydroCHLOROthiazide Tabs - 50-12.5 MG           Hyperlipidemia associated with type 2 diabetes mellitus (HCC)  Cholesterol shows Good control. Long term heart-healthy diet and lifestyle discussed and encouraged to reduce risk of cardiovascular disease.  9/19/2024: Cholesterol, Total 190; HDL Cholesterol 43; Triglycerides 312 (H); LDL Cholesterol 95  Cholesterol Meds: atorvastatin Tabs - 40 MG  stable  Continue with current treatment plan        Type 2 diabetes mellitus with hyperglycemia, without long-term current use of insulin (HCC)  Diabetes: A1c is 9.6 done 9/19/2024 which shows frequent hyperglycemia and poor control! Encouraged better dietary choices and portion control, and increased activity and med changes as listed.   DM Meds: Farxiga Tabs - 10 MG; glipiZIDE ER Tb24 - 10 MG; semaglutide Sopn - 8 MG/3ML      Diabetic Complications: Hyperglycemia: A1c 9.6% 9/19/2024, .  Hypertriglyceridemia: 312, 9/19/2024.  CKD 2 (GFR 72).     Diabetes is : worsening Continue current treatment regimen. Reassess Diabetes in : in 6 months may need further change when blood work returns this week        Primary osteoarthritis of right knee  To see new ortho for evaluation  surgery options this month       Gastroesophageal reflux disease without esophagitis   Stable    [x] chronic stable condition, noted historically, continues present status on omeprazole              Assessment & Plan  Knee  Osteoarthritis  Chronic right knee osteoarthritis with bone-on-bone changes. Prefers non-surgical options due to lifestyle. Gel injections provide temporary relief. Interested in partial knee replacement.  - Refer to new orthopedic specialist for evaluation and treatment options.  - Order knee X-rays at month's end to assess joint state.    Type 2 Diabetes Mellitus  Type 2 diabetes managed with semaglutide 2 mg, improving symptoms and energy. No regular glucose monitoring but reports overall improvement.  - Continue semaglutide 2 mg.  - Order blood work to assess diabetes control.    Depression  Depression managed with bupropion. Considers dose increase due to stress but prefers current dose. Denies depressive symptoms.  - Continue bupropion at current dose.        No follow-ups on file.        Delgado Ravi MD, 4/4/2025, 8:53 AM

## 2025-04-04 NOTE — ASSESSMENT & PLAN NOTE
Diabetes: A1c is 9.6 done 9/19/2024 which shows frequent hyperglycemia and poor control! Encouraged better dietary choices and portion control, and increased activity and med changes as listed.   DM Meds: Farxiga Tabs - 10 MG; glipiZIDE ER Tb24 - 10 MG; semaglutide Sopn - 8 MG/3ML      Diabetic Complications: Hyperglycemia: A1c 9.6% 9/19/2024, .  Hypertriglyceridemia: 312, 9/19/2024.  CKD 2 (GFR 72).     Diabetes is : worsening Continue current treatment regimen. Reassess Diabetes in : in 6 months may need further change when blood work returns this week

## 2025-04-04 NOTE — ASSESSMENT & PLAN NOTE
BP shows good control with last BP of 118/72. Continue lifestyle changes, diet, exercise and weight loss.   9/19/2024: Potassium 3.9; Creatinine 0.89; eGFR-Cr 72  BP Meds: losartan-hydroCHLOROthiazide Tabs - 50-12.5 MG

## 2025-04-04 NOTE — ASSESSMENT & PLAN NOTE
Cholesterol shows Good control. Long term heart-healthy diet and lifestyle discussed and encouraged to reduce risk of cardiovascular disease.  9/19/2024: Cholesterol, Total 190; HDL Cholesterol 43; Triglycerides 312 (H); LDL Cholesterol 95  Cholesterol Meds: atorvastatin Tabs - 40 MG  stable  Continue with current treatment plan

## 2025-04-04 NOTE — PROGRESS NOTES
The following individual(s) verbally consented to be recorded using ambient AI listening technology and understand that they can each withdraw their consent to this listening technology at any point by asking the clinician to turn off or pause the recording:    Patient name: Annmarie Nur Jones

## 2025-04-08 ENCOUNTER — TELEPHONE (OUTPATIENT)
Dept: FAMILY MEDICINE CLINIC | Facility: CLINIC | Age: 65
End: 2025-04-08

## 2025-04-08 ENCOUNTER — MED REC SCAN ONLY (OUTPATIENT)
Dept: FAMILY MEDICINE CLINIC | Facility: CLINIC | Age: 65
End: 2025-04-08

## 2025-04-08 NOTE — TELEPHONE ENCOUNTER
Fax received from [facility] with diabetic eye exam notes from visit on 9-7-24    Dr Ravi carefully reviewed and documented:  No diabetic Retinopathy    [facility] Gap updated and copy sent to scan.

## 2025-04-11 ENCOUNTER — PATIENT MESSAGE (OUTPATIENT)
Dept: FAMILY MEDICINE CLINIC | Facility: CLINIC | Age: 65
End: 2025-04-11

## 2025-04-19 DIAGNOSIS — E11.65 TYPE 2 DIABETES MELLITUS WITH HYPERGLYCEMIA, WITHOUT LONG-TERM CURRENT USE OF INSULIN (HCC): ICD-10-CM

## 2025-04-19 DIAGNOSIS — E11.22 TYPE 2 DIABETES MELLITUS WITH STAGE 2 CHRONIC KIDNEY DISEASE, WITHOUT LONG-TERM CURRENT USE OF INSULIN (HCC): ICD-10-CM

## 2025-04-19 DIAGNOSIS — N18.2 TYPE 2 DIABETES MELLITUS WITH STAGE 2 CHRONIC KIDNEY DISEASE, WITHOUT LONG-TERM CURRENT USE OF INSULIN (HCC): ICD-10-CM

## 2025-04-21 ENCOUNTER — OFFICE VISIT (OUTPATIENT)
Dept: FAMILY MEDICINE CLINIC | Facility: CLINIC | Age: 65
End: 2025-04-21
Payer: MEDICARE

## 2025-04-21 VITALS
HEIGHT: 66 IN | DIASTOLIC BLOOD PRESSURE: 76 MMHG | RESPIRATION RATE: 18 BRPM | HEART RATE: 94 BPM | OXYGEN SATURATION: 98 % | TEMPERATURE: 98 F | BODY MASS INDEX: 28.77 KG/M2 | WEIGHT: 179 LBS | SYSTOLIC BLOOD PRESSURE: 138 MMHG

## 2025-04-21 DIAGNOSIS — J01.90 ACUTE NON-RECURRENT SINUSITIS, UNSPECIFIED LOCATION: Primary | ICD-10-CM

## 2025-04-21 PROCEDURE — 99213 OFFICE O/P EST LOW 20 MIN: CPT | Performed by: PHYSICIAN ASSISTANT

## 2025-04-21 RX ORDER — DOXYCYCLINE 100 MG/1
100 CAPSULE ORAL 2 TIMES DAILY
Qty: 20 CAPSULE | Refills: 0 | Status: SHIPPED | OUTPATIENT
Start: 2025-04-21 | End: 2025-05-01

## 2025-04-21 RX ORDER — FLUCONAZOLE 150 MG/1
150 TABLET ORAL ONCE
Qty: 1 TABLET | Refills: 0 | Status: SHIPPED | OUTPATIENT
Start: 2025-04-21 | End: 2025-04-21

## 2025-04-21 RX ORDER — GLIPIZIDE 10 MG/1
10 TABLET, FILM COATED, EXTENDED RELEASE ORAL 2 TIMES DAILY
Qty: 180 TABLET | Refills: 1 | Status: SHIPPED | OUTPATIENT
Start: 2025-04-21

## 2025-04-21 NOTE — TELEPHONE ENCOUNTER
Requested Prescriptions     Pending Prescriptions Disp Refills    GLIPIZIDE ER 10 MG Oral Tablet 24 Hr [Pharmacy Med Name: GLIPIZIDE ER 10 MG TABLET] 180 tablet 1     Sig: TAKE 1 TABLET BY MOUTH TWICE A DAY     Last refill 10/11/24 #180 x 1   LOV 4/4/25  No future appointments.    Lab Results   Component Value Date    A1C 10.3 (H) 04/04/2025    A1C 9.6 (H) 09/19/2024    A1C 9.2 (H) 03/07/2024    A1C 8.4 (H) 06/29/2023    A1C 8.0 (H) 12/21/2022        Diabetes Medication Protocol [id number] 07:03 AM   Protocol Details Last A1C < 7.5 and within past 6 months    Microalbumin procedure in past 12 months or taking ACE/ARB    In person appointment or virtual visit in the past 6 mos or appointment in next 3 mos    EGFRCR or GFRNAA > 50    GFR in the past 12 months    Medication is active on med list

## 2025-04-21 NOTE — PROGRESS NOTES
CHIEF COMPLAINT:     Chief Complaint   Patient presents with    Sinus Problem     Started week and half ago          HPI:   Annmarie Goldman is a 64 year old female who presents with concerns for sinus infection.  She has facial pressure behind her eyes, congestion, ear hurt and feel full, upper teeth are achy.  Symptoms for 1 1/2 weeks.  She reports feeling generally run down, her brother just passed.       Associated symptoms:    Fever/Chills  No  Sore throat  No  Cough  No   Congestion Yes  Bodyache  No  Headache  Yes  Chest pain No  SOB/Dyspnea No  Loss of taste No  Loss of smell No  Diarrhea No  Vomiting No        Current Medications[1]   Past Medical History[2]   Social History:  Short Social Hx on File[3]     Review of Systems:    Positive for stated complaint: sinus  .   Pertinent positives and negatives noted in the the HPI.    EXAM:   /76   Pulse 94   Temp 97.6 °F (36.4 °C)   Resp 18   Ht 5' 6\" (1.676 m)   Wt 179 lb (81.2 kg)   SpO2 98%   BMI 28.89 kg/m²   GENERAL: well developed, well nourished,in no apparent distress  SKIN: no rashes,no suspicious lesions  HEAD: atraumatic, normocephalic  EYES: conjunctiva clear, sclera white,  PERRLA  EARS: TM's partially obscured by wax but non erythematous  NOSE: nares patent, mucosa moderate congestion.  Tender over sinus cavities  THROAT: Posterior pharynx is  erythematous  NECK: supple, non-tender  LUNGS: clear to auscultation bilaterally without rale, ronchi, wheeze.  CARDIO: S1/S2 without murmur  EXTREMITIES: no cyanosis, clubbing or edema  LYMPH:  no gross lymphadenopathy.      No results found for this or any previous visit (from the past 24 hours).      ASSESSMENT AND PLAN:   Annmarie Goldman is a 64 year old female who presents with Sinus Problem (Started week and half ago /). Symptoms are consistent with:      ASSESSMENT:  Encounter Diagnosis   Name Primary?    Acute non-recurrent sinusitis, unspecified location Yes       PLAN:      Symptomatic care:   1. Rest. Drink plenty of fluids.  2. Tylenol or ibuprofen for discomfort or fever.   3. OTC decongestant (phenylephrine) expectorants (guaifenesin), nasal steroid sprays  (fluticasone) may be helpful        for congestion.  4. OTC cough suppressant for cough  (dextromethorphan)  5. Chloraseptic spray/throat lozenges for sore throat   6 doxycyline as written     Go to the ED for evaluation with progressive symptoms of difficulty swallowing, breathing, shortness of breath, chest pain, extreme weakness, or confusion.         Meds & Refills for this Visit:  Requested Prescriptions     Pending Prescriptions Disp Refills    doxycycline 100 MG Oral Cap 20 capsule 0     Sig: Take 1 capsule (100 mg total) by mouth 2 (two) times daily for 10 days.       Risks, benefits, side effects of medication addressed and explained.    There are no Patient Instructions on file for this visit.    The patient indicates understanding of these issues and agrees to the plan.  The patient is asked to follow up PCP                   [1]   Current Outpatient Medications   Medication Sig Dispense Refill    ATORVASTATIN 40 MG Oral Tab TAKE 1 TABLET BY MOUTH EVERY DAY AT NIGHT 90 tablet 0    LOSARTAN-HYDROCHLOROTHIAZIDE 50-12.5 MG Oral Tab TAKE 1 TABLET BY MOUTH EVERY DAY 90 tablet 0    FARXIGA 10 MG Oral Tab TAKE 1 TABLET BY MOUTH EVERY DAY 90 tablet 0    OMEPRAZOLE 40 MG Oral Capsule Delayed Release TAKE 1 CAPSULE (40 MG TOTAL) BY MOUTH DAILY. 90 capsule 1    glipiZIDE ER 10 MG Oral Tablet 24 Hr Take 1 tablet (10 mg total) by mouth 2 (two) times daily. 180 tablet 1    semaglutide 8 MG/3ML Subcutaneous Solution Pen-injector Inject 2 mg into the skin once a week. 1 each 12    buPROPion 75 MG Oral Tab Take 1 tablet (75 mg total) by mouth 2 (two) times daily. 180 tablet 0    levocetirizine 5 MG Oral Tab Take 1 tablet (5 mg total) by mouth every evening.      Saccharomyces boulardii (FLORASTOR OR) Take 1 tablet by mouth daily.        [2]   Past Medical History:   Allergic rhinitis due to pollen    Diverticulosis of colon    Diverticulosis of colon (without mention of hemorrhage)    Esophageal reflux    Essential hypertension, benign    Family history of breast cancer    Family history of breast cancer in mother    High blood pressure    High cholesterol    Obesity, unspecified    PONV (postoperative nausea and vomiting)    Proteinuria    S/P partial resection of colon    Type II or unspecified type diabetes mellitus without mention of complication, not stated as uncontrolled   [3]   Social History  Socioeconomic History    Marital status:     Number of children: 2   Occupational History    Occupation:    Tobacco Use    Smoking status: Never    Smokeless tobacco: Never   Vaping Use    Vaping status: Never Used   Substance and Sexual Activity    Alcohol use: No     Alcohol/week: 0.0 standard drinks of alcohol    Drug use: Not Currently     Types: Other-see comments     Comment: CBD gummies at night for arthritis    Sexual activity: Yes     Partners: Male     Birth control/protection: Hysterectomy     Comment: ovaries intact     Social Drivers of Health     Food Insecurity: No Food Insecurity (6/29/2023)    Received from [facility]    Food Insecurity     Currently or in the past 3 months, have you worried your food would run out before you had money to buy more?: No     In the past 12 months, have you run out of food or been unable to get more?: No    Received from [facility]    Housing Stability

## 2025-05-19 PROBLEM — N90.4 VULVAR DYSTROPHY: Status: ACTIVE | Noted: 2025-02-12

## 2025-05-20 ENCOUNTER — OFFICE VISIT (OUTPATIENT)
Dept: FAMILY MEDICINE CLINIC | Facility: CLINIC | Age: 65
End: 2025-05-20
Payer: MEDICARE

## 2025-05-20 VITALS
BODY MASS INDEX: 29.22 KG/M2 | RESPIRATION RATE: 17 BRPM | DIASTOLIC BLOOD PRESSURE: 70 MMHG | TEMPERATURE: 99 F | HEIGHT: 66 IN | WEIGHT: 181.81 LBS | SYSTOLIC BLOOD PRESSURE: 130 MMHG | OXYGEN SATURATION: 98 % | HEART RATE: 70 BPM

## 2025-05-20 DIAGNOSIS — R09.81 NASAL CONGESTION: Primary | ICD-10-CM

## 2025-05-20 DIAGNOSIS — H61.23 BILATERAL IMPACTED CERUMEN: ICD-10-CM

## 2025-05-20 DIAGNOSIS — R05.1 ACUTE COUGH: ICD-10-CM

## 2025-05-20 PROCEDURE — 99214 OFFICE O/P EST MOD 30 MIN: CPT

## 2025-05-20 PROCEDURE — 69209 REMOVE IMPACTED EAR WAX UNI: CPT

## 2025-05-20 RX ORDER — PREDNISONE 20 MG/1
20 TABLET ORAL DAILY
Qty: 5 TABLET | Refills: 0 | Status: SHIPPED | OUTPATIENT
Start: 2025-05-20 | End: 2025-05-25

## 2025-05-20 NOTE — PROGRESS NOTES
Annmarie Goldman is a 64 year old female.  HPI:     Patient in office for sinus pain and pressure that started 2 weeks ago.    [facility] for [facility] and treated with doxycycline.  She feels this sinus infection never went away and needs another antibiotic.  She reports her ears feeling stuffy, headache, cough and \"gloppy stuff in throat\".  She has taken cold medication on and off.  She takes xyzol nightly and Flonase but reports Flonase made her ears feel worse.      Patient is currently dealing with a lot of stress.  Her Brother recently passed away and she is caring for 93 year old mother.      Current Medications[1]   Past Medical History[2]   Social History:  Short Social Hx on File[3]       REVIEW OF SYSTEMS:     Review of Systems   Constitutional:  Negative for activity change, appetite change and fatigue.   HENT:  Positive for congestion, ear pain (see hpi) and sinus pressure.    Eyes:  Negative for discharge and redness.   Respiratory:  Positive for cough. Negative for wheezing.    Cardiovascular:  Negative for chest pain.   Gastrointestinal:  Negative for abdominal distention and abdominal pain.   Endocrine: Negative for cold intolerance and heat intolerance.   Genitourinary:  Negative for difficulty urinating and urgency.   Musculoskeletal:  Negative for arthralgias and back pain.   Skin:  Negative for color change.   Allergic/Immunologic: Negative for environmental allergies.   Neurological:  Positive for headaches. Negative for dizziness and syncope.   Hematological:  Negative for adenopathy. Does not bruise/bleed easily.   Psychiatric/Behavioral:  Negative for agitation, behavioral problems and confusion.        EXAM:   /70   Pulse 70   Temp 98.6 °F (37 °C) (Temporal)   Resp 17   Ht 5' 6\" (1.676 m)   Wt 181 lb 12.8 oz (82.5 kg)   SpO2 98%   BMI 29.34 kg/m²     Physical Exam  Constitutional:       Appearance: Normal appearance.   HENT:      Head:  Normocephalic and atraumatic.      Right Ear: There is impacted cerumen.      Left Ear: There is impacted cerumen.      Nose: Congestion and rhinorrhea present.      Right Turbinates: Not swollen.      Left Turbinates: Not swollen.      Mouth/Throat:      Mouth: Mucous membranes are moist.      Pharynx: Oropharynx is clear.   Eyes:      Extraocular Movements: Extraocular movements intact.      Conjunctiva/sclera: Conjunctivae normal.      Pupils: Pupils are equal, round, and reactive to light.   Cardiovascular:      Rate and Rhythm: Normal rate and regular rhythm.      Pulses: Normal pulses.      Heart sounds: Normal heart sounds.   Pulmonary:      Effort: Pulmonary effort is normal.      Breath sounds: Normal breath sounds.   Musculoskeletal:         General: Normal range of motion.      Cervical back: Normal range of motion.   Lymphadenopathy:      Cervical: No cervical adenopathy.   Skin:     General: Skin is warm and dry.      Capillary Refill: Capillary refill takes less than 2 seconds.   Neurological:      Mental Status: She is alert and oriented to person, place, and time.   Psychiatric:         Mood and Affect: Mood normal.         Behavior: Behavior normal.          ASSESSMENT AND PLAN:     1. Nasal congestion  Recommended continued use of xyzol nightly, Flonase daily and can use afrin nasal spray over the counter twice a day for three days as well as tylenol sinus as needed.  - predniSONE 20 MG Oral Tab; Take 1 tablet (20 mg total) by mouth daily for 5 days.  Dispense: 5 tablet; Refill: 0    2. Acute cough  Prednisone sent to pharmacy and instructions provided.  Lung sounds clear. Cough likely from post nasal drainage.  - predniSONE 20 MG Oral Tab; Take 1 tablet (20 mg total) by mouth daily for 5 days.  Dispense: 5 tablet; Refill: 0    3. Bilateral impacted cerumen  Irrigation done and typanic membrane visualized.  - REMOVAL IMPACTED CERUMEN USING IRRIGATION/LAVAGE, UNILATERAL      Medical Decision  Making  Differentials include but are not limited to allergic rhinitis and/or upper respiratory viral illness.  Discussed the likely cause of the cough including post nasal drainage.  No airway inflammation present.  Discussed elevating head of bed when sleeping, nasal saline and netti pot as needed for nasal drainage/congestion.  Discussed possible association of environmental allergens as an additional trigger    30 minutes were spent with this patient on assessment, education, and discussion of treatment plan.     The patient indicates understanding of these issues and agrees to the plan.  The patient is asked to return as needed.    Julieth Russ, APRN  5/19/2025           [1]   Current Outpatient Medications   Medication Sig Dispense Refill    GLIPIZIDE ER 10 MG Oral Tablet 24 Hr TAKE 1 TABLET BY MOUTH TWICE A  tablet 1    ATORVASTATIN 40 MG Oral Tab TAKE 1 TABLET BY MOUTH EVERY DAY AT NIGHT 90 tablet 0    LOSARTAN-HYDROCHLOROTHIAZIDE 50-12.5 MG Oral Tab TAKE 1 TABLET BY MOUTH EVERY DAY 90 tablet 0    FARXIGA 10 MG Oral Tab TAKE 1 TABLET BY MOUTH EVERY DAY 90 tablet 0    OMEPRAZOLE 40 MG Oral Capsule Delayed Release TAKE 1 CAPSULE (40 MG TOTAL) BY MOUTH DAILY. 90 capsule 1    semaglutide 8 MG/3ML Subcutaneous Solution Pen-injector Inject 2 mg into the skin once a week. 1 each 12    buPROPion 75 MG Oral Tab Take 1 tablet (75 mg total) by mouth 2 (two) times daily. 180 tablet 0    levocetirizine 5 MG Oral Tab Take 1 tablet (5 mg total) by mouth every evening.      Saccharomyces boulardii (FLORASTOR OR) Take 1 tablet by mouth daily.       [2]   Past Medical History:   Allergic rhinitis due to pollen    Diverticulosis of colon    Diverticulosis of colon (without mention of hemorrhage)    Esophageal reflux    Essential hypertension, benign    Family history of breast cancer    Family history of breast cancer in mother    High blood pressure    High cholesterol    Obesity, unspecified    PONV (postoperative  nausea and vomiting)    Proteinuria    S/P partial resection of colon    Type II or unspecified type diabetes mellitus without mention of complication, not stated as uncontrolled   [3]   Social History  Socioeconomic History    Marital status:     Number of children: 2   Occupational History    Occupation:    Tobacco Use    Smoking status: Never    Smokeless tobacco: Never   Vaping Use    Vaping status: Never Used   Substance and Sexual Activity    Alcohol use: No     Alcohol/week: 0.0 standard drinks of alcohol    Drug use: Not Currently     Types: Other-see comments     Comment: CBD gummies at night for arthritis    Sexual activity: Yes     Partners: Male     Birth control/protection: Hysterectomy     Comment: ovaries intact     Social Drivers of Health     Food Insecurity: No Food Insecurity (6/29/2023)    Received from [facility]    Food Insecurity     Currently or in the past 3 months, have you worried your food would run out before you had money to buy more?: No     In the past 12 months, have you run out of food or been unable to get more?: No    Received from [facility]    Housing Stability

## 2025-05-27 RX ORDER — BUPROPION HYDROCHLORIDE 75 MG/1
75 TABLET ORAL 2 TIMES DAILY
Qty: 180 TABLET | Refills: 1 | Status: SHIPPED | OUTPATIENT
Start: 2025-05-27

## 2025-05-27 NOTE — TELEPHONE ENCOUNTER
Psychiatric Non-Scheduled (Anti-Anxiety) Passed05/26/2025 07:49 PM   Protocol Details In person appointment or virtual visit in the past 6 mos or appointment in next 3 mos    Depression Screening completed within the past 12 months    Medication is active on med list

## 2025-06-15 DIAGNOSIS — K21.9 GASTROESOPHAGEAL REFLUX DISEASE WITHOUT ESOPHAGITIS: ICD-10-CM

## 2025-06-15 DIAGNOSIS — E11.65 TYPE 2 DIABETES MELLITUS WITH HYPERGLYCEMIA, WITHOUT LONG-TERM CURRENT USE OF INSULIN (HCC): ICD-10-CM

## 2025-06-15 DIAGNOSIS — N18.2 TYPE 2 DIABETES MELLITUS WITH STAGE 2 CHRONIC KIDNEY DISEASE, WITHOUT LONG-TERM CURRENT USE OF INSULIN (HCC): ICD-10-CM

## 2025-06-15 DIAGNOSIS — I10 ESSENTIAL HYPERTENSION, BENIGN: ICD-10-CM

## 2025-06-15 DIAGNOSIS — E11.22 TYPE 2 DIABETES MELLITUS WITH STAGE 2 CHRONIC KIDNEY DISEASE, WITHOUT LONG-TERM CURRENT USE OF INSULIN (HCC): ICD-10-CM

## 2025-06-17 RX ORDER — DAPAGLIFLOZIN 10 MG/1
10 TABLET, FILM COATED ORAL DAILY
Qty: 90 TABLET | Refills: 0 | Status: SHIPPED | OUTPATIENT
Start: 2025-06-17

## 2025-06-17 RX ORDER — LOSARTAN POTASSIUM AND HYDROCHLOROTHIAZIDE 12.5; 5 MG/1; MG/1
1 TABLET ORAL DAILY
Qty: 90 TABLET | Refills: 0 | Status: SHIPPED | OUTPATIENT
Start: 2025-06-17

## 2025-06-17 RX ORDER — OMEPRAZOLE 40 MG/1
40 CAPSULE, DELAYED RELEASE ORAL DAILY
Qty: 90 CAPSULE | Refills: 1 | Status: SHIPPED | OUTPATIENT
Start: 2025-06-17

## 2025-06-17 NOTE — TELEPHONE ENCOUNTER
Farxiga   Last refill: 03/19/25  Qty 90  W/ 0 refills  Last ov: 05/20/25  Last labs 04/04/25    Losartan   Last refill: 03/19/25  Qty 90  W/ 0 refills  Last ov 05/20/25    Omeprazole  Last refill:  12/26/24  Qty 90  W/ 1 refills  Last ov 05/20/25    Requested Prescriptions     Pending Prescriptions Disp Refills    FARXIGA 10 MG Oral Tab [Pharmacy Med Name: FARXIGA 10 MG TABLET] 90 tablet 0     Sig: TAKE 1 TABLET BY MOUTH EVERY DAY    LOSARTAN-HYDROCHLOROTHIAZIDE 50-12.5 MG Oral Tab [Pharmacy Med Name: LOSARTAN-HCTZ 50-12.5 MG TAB] 90 tablet 0     Sig: TAKE 1 TABLET BY MOUTH EVERY DAY    OMEPRAZOLE 40 MG Oral Capsule Delayed Release [Pharmacy Med Name: OMEPRAZOLE DR 40 MG CAPSULE] 90 capsule 1     Sig: TAKE 1 CAPSULE (40 MG TOTAL) BY MOUTH DAILY.   No future appointments.

## 2025-07-03 ENCOUNTER — TELEPHONE (OUTPATIENT)
Dept: FAMILY MEDICINE CLINIC | Facility: CLINIC | Age: 65
End: 2025-07-03

## 2025-07-03 NOTE — TELEPHONE ENCOUNTER
Called to book maw, patient states has an insurance change coming up and will make the wellness after

## 2025-07-14 ENCOUNTER — OFFICE VISIT (OUTPATIENT)
Dept: FAMILY MEDICINE CLINIC | Facility: CLINIC | Age: 65
End: 2025-07-14
Payer: MEDICARE

## 2025-07-14 VITALS
BODY MASS INDEX: 29 KG/M2 | WEIGHT: 180 LBS | HEART RATE: 97 BPM | OXYGEN SATURATION: 96 % | RESPIRATION RATE: 18 BRPM | SYSTOLIC BLOOD PRESSURE: 126 MMHG | TEMPERATURE: 98 F | DIASTOLIC BLOOD PRESSURE: 72 MMHG

## 2025-07-14 DIAGNOSIS — J01.00 ACUTE NON-RECURRENT MAXILLARY SINUSITIS: Primary | ICD-10-CM

## 2025-07-14 RX ORDER — DOXYCYCLINE HYCLATE 100 MG
100 TABLET ORAL 2 TIMES DAILY
Qty: 14 TABLET | Refills: 0 | Status: SHIPPED | OUTPATIENT
Start: 2025-07-14 | End: 2025-07-21

## 2025-07-14 NOTE — PROGRESS NOTES
CHIEF COMPLAINT:     Chief Complaint   Patient presents with    Sinus Problem     Started a week ago        HPI:   Annmarie Goldman is a 64 year old female who presents for concerns of a sinus infection. Patient reports sinus pressure/congestion, thick nasal drainage for the past 10 days.  Denies any fevers, wheezing, chest discomfort, or shortness of breath.  Treating symptoms with flonase and antihistamines.   Tolerates PO well at home. No n/v/d.  Denies any other aggravating or relieving factors at home. Denies any other treatment attempts prior to arrival.       Current Medications[1]   Past Medical History[2]   Past Surgical History[3]      Short Social Hx on File[4]      REVIEW OF SYSTEMS:   GENERAL: Denies fevers. Notes good appetite  SKIN: no rashes or abnormal skin lesions  HEENT: Denies sore throat, + sinus symptoms, Denies ear pain  LUNGS: Denies cough, denies shortness of breath or wheezing,   CARDIOVASCULAR: denies chest pain or palpitations   GI: denies N/V/C or abdominal pain  NEURO: Denies headaches    EXAM:   /72   Pulse 97   Temp 97.7 °F (36.5 °C)   Resp 18   Wt 180 lb (81.6 kg)   SpO2 96%   BMI 29.05 kg/m²   GENERAL: well developed, well nourished,in no apparent distress  SKIN: no rashes,no suspicious lesions  HEAD: atraumatic, normocephalic.    EYES: conjunctiva clear  EARS: TM's intact and without erythema, no bulging, no retraction,appears dusky bilat, bony landmarks visualized. No erythema or swelling noted to ear canals or external ears.   NOSE: Nostrils patent, dried yellow nasal mucous,nasal mucosa reddened   THROAT: Oral mucosa pink, moist. Posterior pharynx is mildly erythematous. No exudates. No tonsillar hypertrophy noted.  No trismus. Uvula midline with no swelling. Voice clear/normal. No stridor  NECK: Supple, non-tender  LUNGS: clear to auscultation bilaterally, no rales, wheezes or rhonchi. Breathing is non labored.  CARDIO: RRR without murmur  EXTREMITIES: no  cyanosis, clubbing or edema  LYMPH:  No lymphadenopathy.        ASSESSMENT AND PLAN:       ICD-10-CM    1. Acute non-recurrent maxillary sinusitis  J01.00 Doxycycline Hyclate 100 MG Oral Tab        Discussed physical exam and hpi with pt. Pt has reassuring physical exam consistent with sinusitis. We discussed viral vs allergy vs bacterial etiologies associated with sinusitis. Pt notes she would like to start abx today and declined delayed antimicrobial therapy option. Treatment options discussed with patient and explained in detail. Will start doxycycline along with supportive care. The risks, benefits and potential side effects of possible medications were reviewed. Alternatives were discussed. Monitoring parameters and expected course outlined. Patient to call PCP or go to emergency department if symptoms fail to respond as outlined, or worsen in any way. The patient agreed with the plan.     Patient Instructions   1. Rest. Drink plenty of fluids.     2. Supportive care as discussed.     3. Doxycycline as prescribed.    4. Follow up with PMD in 4-5 days for re-eval. Go to the emergency department immediately if symptoms worsen, change, you develop chest discomfort, wheezing, shortness of breath, or if you have any concerns.        The patient indicates understanding of these issues and agrees to the plan.           [1]   Current Outpatient Medications   Medication Sig Dispense Refill    Doxycycline Hyclate 100 MG Oral Tab Take 1 tablet (100 mg total) by mouth 2 (two) times daily for 7 days. 14 tablet 0    FARXIGA 10 MG Oral Tab TAKE 1 TABLET BY MOUTH EVERY DAY 90 tablet 0    LOSARTAN-HYDROCHLOROTHIAZIDE 50-12.5 MG Oral Tab TAKE 1 TABLET BY MOUTH EVERY DAY 90 tablet 0    OMEPRAZOLE 40 MG Oral Capsule Delayed Release TAKE 1 CAPSULE (40 MG TOTAL) BY MOUTH DAILY. 90 capsule 1    BUPROPION 75 MG Oral Tab TAKE 1 TABLET BY MOUTH TWICE A  tablet 1    GLIPIZIDE ER 10 MG Oral Tablet 24 Hr TAKE 1 TABLET BY MOUTH TWICE  A  tablet 1    ATORVASTATIN 40 MG Oral Tab TAKE 1 TABLET BY MOUTH EVERY DAY AT NIGHT 90 tablet 0    semaglutide 8 MG/3ML Subcutaneous Solution Pen-injector Inject 2 mg into the skin once a week. 1 each 12    levocetirizine 5 MG Oral Tab Take 1 tablet (5 mg total) by mouth every evening.      Saccharomyces boulardii (FLORASTOR OR) Take 1 tablet by mouth daily.       [2]   Past Medical History:   Allergic rhinitis due to pollen    Diverticulosis of colon    Diverticulosis of colon (without mention of hemorrhage)    Esophageal reflux    Essential hypertension, benign    Family history of breast cancer    Family history of breast cancer in mother    High blood pressure    High cholesterol    Obesity, unspecified    PONV (postoperative nausea and vomiting)    Proteinuria    S/P partial resection of colon    Type II or unspecified type diabetes mellitus without mention of complication, not stated as uncontrolled   [3]   Past Surgical History:  Procedure Laterality Date    Appendectomy      Breast surgery  1977    Cholecystectomy  04/16/2022    Colectomy  12/2014    removed 10 inches of colon for diverticulitis    Colonoscopy  11/14    diverticulitis    Hand/finger surgery unlisted Left 02/2018    Hysterectomy  1994    LAVH for endometriosis (Ovaries left intact)    Knee surgery Left 04/19/2019    Leg/ankle surgery proc unlisted  1996    right ankle    Other      ARTHROSCOPIC KNEE SURGERY BILAT    Other      \"BRACHIAL CEFT (LEFT?)CYST\"    Other surgical history  2012    cyst on back removed    Reduction left  1976    Reduction right  1976   [4]   Social History  Socioeconomic History    Marital status:     Number of children: 2   Occupational History    Occupation:    Tobacco Use    Smoking status: Never    Smokeless tobacco: Never   Vaping Use    Vaping status: Never Used   Substance and Sexual Activity    Alcohol use: No     Alcohol/week: 0.0 standard drinks of alcohol    Drug use: Not Currently      Types: Other-see comments     Comment: CBD gummies at night for arthritis    Sexual activity: Yes     Partners: Male     Birth control/protection: Hysterectomy     Comment: ovaries intact     Social Drivers of Health     Food Insecurity: No Food Insecurity (5/20/2025)    NCSS - Food Insecurity     Worried About Running Out of Food in the Last Year: No     Ran Out of Food in the Last Year: No   Transportation Needs: No Transportation Needs (5/20/2025)    NCSS - Transportation     Lack of Transportation: No   Housing Stability: Not At Risk (5/20/2025)    NCSS - Housing/Utilities     Has Housing: Yes     Worried About Losing Housing: No     Unable to Get Utilities: No

## 2025-07-20 ENCOUNTER — PATIENT MESSAGE (OUTPATIENT)
Dept: FAMILY MEDICINE CLINIC | Facility: CLINIC | Age: 65
End: 2025-07-20

## 2025-07-20 DIAGNOSIS — J01.00 ACUTE NON-RECURRENT MAXILLARY SINUSITIS: ICD-10-CM

## 2025-07-21 RX ORDER — DOXYCYCLINE HYCLATE 100 MG
100 TABLET ORAL 2 TIMES DAILY
Qty: 14 TABLET | Refills: 0 | Status: SHIPPED | OUTPATIENT
Start: 2025-07-21 | End: 2025-07-28

## 2025-08-07 DIAGNOSIS — E78.5 HYPERLIPIDEMIA ASSOCIATED WITH TYPE 2 DIABETES MELLITUS (HCC): ICD-10-CM

## 2025-08-07 DIAGNOSIS — E11.69 HYPERLIPIDEMIA ASSOCIATED WITH TYPE 2 DIABETES MELLITUS (HCC): ICD-10-CM

## 2025-08-08 RX ORDER — ATORVASTATIN CALCIUM 40 MG/1
40 TABLET, FILM COATED ORAL NIGHTLY
Qty: 90 TABLET | Refills: 1 | Status: SHIPPED | OUTPATIENT
Start: 2025-08-08

## 2025-08-19 DIAGNOSIS — E11.65 TYPE 2 DIABETES MELLITUS WITH HYPERGLYCEMIA, WITHOUT LONG-TERM CURRENT USE OF INSULIN (HCC): ICD-10-CM

## 2025-08-20 ENCOUNTER — TELEPHONE (OUTPATIENT)
Dept: FAMILY MEDICINE CLINIC | Facility: CLINIC | Age: 65
End: 2025-08-20

## 2025-08-20 RX ORDER — SEMAGLUTIDE 2.68 MG/ML
2 INJECTION, SOLUTION SUBCUTANEOUS WEEKLY
Qty: 9 ML | Refills: 4 | Status: SHIPPED | OUTPATIENT
Start: 2025-08-20

## 2025-08-21 ENCOUNTER — TELEPHONE (OUTPATIENT)
Dept: FAMILY MEDICINE CLINIC | Facility: CLINIC | Age: 65
End: 2025-08-21

## 2025-08-21 ENCOUNTER — MED REC SCAN ONLY (OUTPATIENT)
Dept: FAMILY MEDICINE CLINIC | Facility: CLINIC | Age: 65
End: 2025-08-21

## (undated) DIAGNOSIS — IMO0002 UNCONTROLLED TYPE 2 DIABETES MELLITUS WITH STAGE 2 CHRONIC KIDNEY DISEASE, WITHOUT LONG-TERM CURRENT USE OF INSULIN: ICD-10-CM

## (undated) DIAGNOSIS — E11.69 HYPERLIPIDEMIA ASSOCIATED WITH TYPE 2 DIABETES MELLITUS (HCC): ICD-10-CM

## (undated) DIAGNOSIS — I10 ESSENTIAL HYPERTENSION, BENIGN: ICD-10-CM

## (undated) DIAGNOSIS — K80.21 CALCULUS OF GALLBLADDER WITH BILIARY OBSTRUCTION BUT WITHOUT CHOLECYSTITIS: ICD-10-CM

## (undated) DIAGNOSIS — E78.5 HYPERLIPIDEMIA ASSOCIATED WITH TYPE 2 DIABETES MELLITUS (HCC): ICD-10-CM

## (undated) DIAGNOSIS — Z02.9 ENCOUNTERS FOR UNSPECIFIED ADMINISTRATIVE PURPOSE: ICD-10-CM

## (undated) DEVICE — SYRINGE 50ML LL TIP

## (undated) DEVICE — CATH GOLD PROBE HEMOGLIDE 7FR

## (undated) DEVICE — #11 STERILE BLADE: Brand: POLYMER COATED BLADES

## (undated) DEVICE — ENDOSCOPY PACK UPPER: Brand: MEDLINE INDUSTRIES, INC.

## (undated) DEVICE — ZIMMER® STERILE DISPOSABLE TOURNIQUET CUFF WITH PLC, DUAL PORT, SINGLE BLADDER, 34 IN. (86 CM)

## (undated) DEVICE — 10K/24K ARTHROSCOPY INFLOW TUBE SET: Brand: 10K/24K

## (undated) DEVICE — SYRINGE 10ML LL TIP

## (undated) DEVICE — TROCAR: Brand: KII SHIELDED BLADED ACCESS SYSTEM

## (undated) DEVICE — SOL  .9 3000ML

## (undated) DEVICE — REM POLYHESIVE ADULT PATIENT RETURN ELECTRODE: Brand: VALLEYLAB

## (undated) DEVICE — TROCAR: Brand: KII® SLEEVE

## (undated) DEVICE — TISSUE RETRIEVAL SYSTEM: Brand: INZII RETRIEVAL SYSTEM

## (undated) DEVICE — SPHINCTEROTOME: Brand: HYDRATOME RX 44

## (undated) DEVICE — KENDALL SCD EXPRESS SLEEVES, KNEE LENGTH, MEDIUM: Brand: KENDALL SCD

## (undated) DEVICE — FILTERLINE NASAL ADULT O2/CO2

## (undated) DEVICE — LOCKING DEVICE RX & BIOPSY CAP

## (undated) DEVICE — LAP CHOLE/APPY CDS-LF: Brand: MEDLINE INDUSTRIES, INC.

## (undated) DEVICE — Device

## (undated) DEVICE — DISPOSABLE LAPAROSCOPIC CLIP APPLIER WITH 20 CLIPS.: Brand: EPIX® UNIVERSAL CLIP APPLIER

## (undated) DEVICE — ABSORBABLE HEMOSTAT (OXIDIZED REGENERATED CELLULOSE, U.S.P.): Brand: SURGICEL

## (undated) DEVICE — STERILE POLYISOPRENE POWDER-FREE SURGICAL GLOVES: Brand: PROTEXIS

## (undated) DEVICE — SCD SLEEVE KNEE HI BLEND

## (undated) DEVICE — RETRIEVAL BALLOON CATHETER: Brand: EXTRACTOR™ PRO RX

## (undated) DEVICE — BOWLS UTILITY 16OZ

## (undated) DEVICE — HYDRA JAGWIRE

## (undated) DEVICE — SUTURE ETHILON 3-0 PS-2

## (undated) DEVICE — 3M™ RED DOT™ MONITORING ELECTRODE WITH FOAM TAPE AND STICKY GEL, 50/BAG, 20/CASE, 72/PLT 2570: Brand: RED DOT™

## (undated) DEVICE — Device: Brand: DEFENDO AIR/WATER/SUCTION AND BIOPSY VALVE

## (undated) DEVICE — SOL  .9 1000ML BTL

## (undated) DEVICE — C-ARM: Brand: UNBRANDED

## (undated) DEVICE — SHEET,DRAPE,40X58,STERILE: Brand: MEDLINE

## (undated) DEVICE — GAMMEX® PI HYBRID SIZE 6.5, STERILE POWDER-FREE SURGICAL GLOVE, POLYISOPRENE AND NEOPRENE BLEND: Brand: GAMMEX

## (undated) DEVICE — UNDYED BRAIDED (POLYGLACTIN 910), SYNTHETIC ABSORBABLE SUTURE: Brand: COATED VICRYL

## (undated) DEVICE — TIGERTAIL 5F FLXTIP 70CM

## (undated) DEVICE — BANDAID COVERLET 1X3

## (undated) DEVICE — ENDOPATH ULTRA VERESS INSUFFLATION NEEDLES WITH LUER LOCK CONNECTORS: Brand: ENDOPATH

## (undated) DEVICE — 40580 - THE PINK PAD - ADVANCED TRENDELENBURG POSITIONING KIT: Brand: 40580 - THE PINK PAD - ADVANCED TRENDELENBURG POSITIONING KIT

## (undated) DEVICE — ENDOPATH 5MM CURVED SCISSORS WITH MONOPOLAR CAUTERY: Brand: ENDOPATH

## (undated) DEVICE — DYONICS 3.5 MM INCISOR PLUS ELITE                                    STRAIGHT DISPOSABLE BLADES,                                    POWER/EP-1, WHITE, PACKAGED 6 PER                                    BOX, STERILE

## (undated) DEVICE — STERILE SYNTHETIC POLYISOPRENE POWDER-FREE SURGICAL GLOVES WITH HYDROGEL COATING: Brand: PROTEXIS

## (undated) DEVICE — LIGHT HANDLE

## (undated) DEVICE — OMNIPAQUE 300 POLYB 50ML

## (undated) DEVICE — CHLORAPREP 26ML APPLICATOR

## (undated) DEVICE — KNEE ARTHROSCOPY CDS: Brand: MEDLINE INDUSTRIES, INC.

## (undated) DEVICE — SUTURE VICRYL 0 UR-6

## (undated) DEVICE — 1200CC GUARDIAN II: Brand: GUARDIAN

## (undated) DEVICE — ENDOPATH 5MM ENDOSCOPIC BLUNT TIP DISSECTORS (12 POUCHES CONTAINING 3 DISSECTORS EACH): Brand: ENDOPATH

## (undated) DEVICE — NEEDLE CONTRAST INTERJECT 25G

## (undated) DEVICE — ZIPWIRE GUIDEWIRE .035X150 STR

## (undated) DEVICE — SUTURE VICRYL 0

## (undated) DEVICE — OCCLUSIVE GAUZE STRIP OVERWRAP,3% BISMUTH TRIBROMOPHENATE IN PETROLATUM BLEND: Brand: XEROFORM

## (undated) NOTE — LETTER
Date: 7/31/2018    Patient Name: Michael Cuello          To Whom it may concern: The above patient was seen at the [facility] for treatment of a medical condition. This patient should be excused from attending work 7/31/2018.     The pat

## (undated) NOTE — LETTER
[facility]  Phone: [phone]  OUTSIDE TESTING RESULT REQUEST      TO:   Dr. Carole Pinto Date: 8/19/19    FAX #: [phone]     IMPORTANT: FOR YOUR IMMEDIATE ATTENTION  Please FAX all test results listed below to: [phone]

## (undated) NOTE — LETTER
Date: 11/2/2017    Patient Name: Keagan Zayas          To Whom it may concern: The above patient was seen at the [facility] for treatment of a medical condition. This patient should be excused from attending work 11/2/2017.     The pat

## (undated) NOTE — LETTER
09/20/19      [address] [place] [facility] [address]          Dear Stephenie Burr,    [address] [facility] records indicate that you are due for fasting blood work (lipid panel, CMP, HgA1c)    Please call our office during normal business hours so that we may schedule

## (undated) NOTE — LETTER
Date: 9/19/2017    Patient Name: Heidy Cherry          To Whom it may concern: The above patient was seen at the [facility] for treatment of a medical condition. This patient should be excused from attending work 9/19/2017.     The pat

## (undated) NOTE — LETTER
Date: 9/4/2018    Patient Name: Lexus Shahid          To Whom it may concern: This letter has been written at the patient's request. The above patient was seen at the [facility] for treatment of a medical condition.     This patient shoul

## (undated) NOTE — LETTER
[facility], [address] [place] [address]  [address] [address] [phone]            To whom it may concern,    Please excuse Victorino Mullins  58 from work on 17 from 315pm to     530pm due to a med

## (undated) NOTE — LETTER
Date: 8/28/2017    Patient Name: Sadie Rae          To Whom it may concern: This letter has been written at the patient's request. The above patient is an established patient at the [facility] in [place], [place].     This patient should b

## (undated) NOTE — LETTER
Date: 3/14/2019    Patient Name: Pau Coyne          To Whom it may concern: The above patient was seen at the [facility] for treatment of a medical condition.     This patient should be excused from attending work 3/12-13-14/2019 due t

## (undated) NOTE — LETTER
Date: 5/23/2018    Patient Name: Bairon Rai          To Whom it may concern: This letter has been written at the patient's request. The above patient was seen at the [facility] for treatment of a medical condition.     This patient shou

## (undated) NOTE — LETTER
Date: 10/16/2018    Patient Name: Marcela Mejia          To Whom it may concern: This letter has been written at the patient's request. The above patient was seen at the [facility] for treatment of a medical condition.     This patient marcello

## (undated) NOTE — Clinical Note
Date: 1/17/2017    Patient Name: Aurelia Brambila          To Whom it may concern: This letter has been written at the patient's request. The above patient was seen at the [facility] for treatment of a medical condition.     This patient marcellou

## (undated) NOTE — Clinical Note
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 They are requiring more supervision due to chronic medical conditions.  Lynn Mancini find

## (undated) NOTE — LETTER
Date: 4/12/2018    Patient Name: Jose Delatorre          To Whom it may concern: The above patient was seen at the [facility] for treatment of a medical condition. This patient should be excused from attending work 4/12/2018.     The pat

## (undated) NOTE — LETTER
Date: 12/14/2018    Patient Name: Sole Kerr          To Whom it may concern: This letter has been written at the patient's request. The above patient was seen at the [facility] for treatment of a medical condition.     This patient marcello

## (undated) NOTE — Clinical Note
Charlene, I saw [facility] in office. She presents with a soft tissue mass of her back. This is likely a lipoma. She does feel some pain coming from this area. I am going to schedule her to have this removed under sedation.   Thank you Jeremiah Myers

## (undated) NOTE — MR AVS SNAPSHOT
[facility]  [address] [facility] [address] [phone]               Thank you for choosing us for your health care visit with Alec Martines MD.  We are glad to serve you and happy to provide you with this summary o - [address] [place] [address] 34, [phone], [phone]  [facility] 8, [address] [address]     Phone:  [phone]    - uuxhqtvutdli 250 MG Tabs            MyChart     Visit MyChart  You can access your MyChart to more activel

## (undated) NOTE — Clinical Note
Today a1c at 7.9 from 9.9.  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

## (undated) NOTE — LETTER
Date: 12/14/2018    Patient Name: Jose Cassidy          To Whom it may concern: This letter has been written at the patient's request. The above patient was seen at the [facility] for treatment of a medical condition.     This patient marcello

## (undated) NOTE — LETTER
Date: 2/25/2019    Patient Name: Michael Cuello          To Whom it may concern: The above patient was seen at the [facility] for treatment of a medical condition.     This patient should be excused from attending work 2/21,22,23/2019 due t

## (undated) NOTE — LETTER
Date: 2/1/2018    Patient Name: Prakash Baez          To Whom it may concern: The above patient was seen at the [facility] for treatment of a medical condition. This patient should be excused from attending work 2/1/2018.         Since

## (undated) NOTE — LETTER
06/12/20        [address] [place] [address] [place] [address]      Dear Christina Daigle records indicate that you have outstanding lab work and or testing that was ordered for you and has not yet been completed:  Orders Placed This Encounter      He

## (undated) NOTE — LETTER
Date: 7/6/2018    Patient Name: Prakash Baez          To Whom it may concern: This letter has been written at the patient's request. The above patient was seen at the [facility] for treatment of a medical condition.     This patient shoul

## (undated) NOTE — Clinical Note
Thank you for your referral, very reluctant to inject or even test BG. Agree to try alternate orals and but professional sensor on her in 2 weeks and if not improving agrees to try basal insulin.  Will keep you posted

## (undated) NOTE — MR AVS SNAPSHOT
[facility]  [address] [facility] [id number] [phone]               Thank you for choosing us for your health care visit with Marcelino Raygoza DO.   We are glad to serve you and happy to provide you with this summary of Dapagliflozin Propanediol 10 MG Tabs   Take 10 mg by mouth daily.    Commonly known as:  FARXIGA           glimepiride 4 MG Tabs   Take 2 tabs daily to equal 8mg   Commonly known as:  AMARYL           Losartan Potassium-HCTZ 50-12.5 MG Tabs   1 PO Q AM   C

## (undated) NOTE — Clinical Note
Date: 2/2/2017    Patient Name: Amaris Barlow          To Whom it may concern: This letter has been written at the patient's request. The above patient was seen at the [facility] for treatment of a medical condition.     This patient shoul

## (undated) NOTE — MR AVS SNAPSHOT
[facility]  [address] [facility] [address]  [phone]               Thank you for choosing us for your health care visit with Vish Arroyo MD.  We are glad to serve you and happy to provide you with this summary o Take 1 capsule (40 mg total) by mouth daily.                 Where to Get Your Medications      [facility] [address], [address] [facility] [address] 29, [phone], [phone] [address] HOW TO GET STARTED: HOW TO STAY MOTIVATED:   Start activities slowly and build up over time Do what you like   Get your heart pumping – brisk walking, biking, swimming Even 10 minute increments are effective and add up over the week   2 ½ hours per week –

## (undated) NOTE — LETTER
Date: 10/16/2018    Patient Name: Celine Marie          To Whom it may concern: This letter has been written at the patient's request. The above patient was seen at the [facility] for treatment of a medical condition.     This patient marcello

## (undated) NOTE — ED AVS SNAPSHOT
Jose Cassidy   MRN: [id number]    Department:  [facility] Emergency Department   Date of Visit:  5/22/2018           Disclosure     Insurance plans vary and the physician(s) referred by the ER may not be covered by your plan.  Please contact your tell this physician (or your personal doctor if your instructions are to return to your personal doctor) about any new or lasting problems. The primary care or specialist physician will see patients referred from the [facility] Emergency Department.  James Abarca

## (undated) NOTE — ED AVS SNAPSHOT
Jose Delatorre   MRN: [id number]    Department:  [facility] Emergency Department   Date of Visit:  7/9/2019           Disclosure     Insurance plans vary and the physician(s) referred by the ER may not be covered by your plan.  Please contact your tell this physician (or your personal doctor if your instructions are to return to your personal doctor) about any new or lasting problems. The primary care or specialist physician will see patients referred from the [facility] Emergency Department.  Teena Hyman